# Patient Record
Sex: MALE | Race: WHITE | NOT HISPANIC OR LATINO | Employment: OTHER | URBAN - METROPOLITAN AREA
[De-identification: names, ages, dates, MRNs, and addresses within clinical notes are randomized per-mention and may not be internally consistent; named-entity substitution may affect disease eponyms.]

---

## 2017-06-27 ENCOUNTER — PRE VISIT (OUTPATIENT)
Dept: CARDIOLOGY | Facility: CLINIC | Age: 72
End: 2017-06-27

## 2017-06-29 ENCOUNTER — OFFICE VISIT (OUTPATIENT)
Dept: CARDIOLOGY | Facility: CLINIC | Age: 72
End: 2017-06-29
Attending: INTERNAL MEDICINE
Payer: COMMERCIAL

## 2017-06-29 VITALS
HEART RATE: 74 BPM | SYSTOLIC BLOOD PRESSURE: 130 MMHG | BODY MASS INDEX: 29.51 KG/M2 | WEIGHT: 188 LBS | DIASTOLIC BLOOD PRESSURE: 82 MMHG | HEIGHT: 67 IN

## 2017-06-29 DIAGNOSIS — I25.10 CORONARY ARTERY DISEASE INVOLVING NATIVE CORONARY ARTERY OF NATIVE HEART WITHOUT ANGINA PECTORIS: ICD-10-CM

## 2017-06-29 DIAGNOSIS — E78.2 MIXED HYPERLIPIDEMIA: ICD-10-CM

## 2017-06-29 DIAGNOSIS — Z95.1 POSTSURGICAL AORTOCORONARY BYPASS STATUS: ICD-10-CM

## 2017-06-29 DIAGNOSIS — E88.810 METABOLIC SYNDROME: ICD-10-CM

## 2017-06-29 PROCEDURE — 99214 OFFICE O/P EST MOD 30 MIN: CPT | Performed by: INTERNAL MEDICINE

## 2017-06-29 NOTE — PROGRESS NOTES
HPI and Plan:   See dictation:  I had the pleasure of seeing your patient, Geoffrey Benjamin (Woody), at [facility] for evaluation of coronary artery disease.  The patient is status post 4-vessel bypass surgery 11/07/2006 utilizing the left internal mammary artery to the LAD and a diagonal branch artery sequentially as well as a saphenous vein graft to the posterolateral branch of the right coronary artery and a free radial artery to the obtuse marginal branch artery.  His ejection fraction was well maintained.  His last stress echocardiogram 04/04/2013 was normal.  The patient exercises 4-6 days per week walking either at the mall or outside for 30-40 minutes free of angina or shortness of breath.  He has not had any hospitalizations or surgeries since I saw him 1 year ago.  He denies angina or shortness of breath.  He has significant backache and is seeing physical therapy and a chiropractor.  His hemoglobin A1c at Dr. Sanchez's office is a bit elevated at 8.2%.  The patient was intolerant of atorvastatin and has been able to work up to 10 mg 4 times per week.  He denies claudication when walking.  He denies PND, orthopnea, peripheral edema, syncope, presyncope or palpitations.  FLP in 11/16 was LDL=91 and HDL=29.  ASSESSMENT:   1.  Juan Benjamin is a delightful 70-year-old male status post 4-vessel bypass surgery on 11/07/2006 due to severe 3-vessel coronary artery disease and an abnormal stress test.  He has not had recurrent angina or congestive heart failure.  His last stress echocardiogram in 2013 was normal and we will not repeat this until probably 2018.  He is doing a good job of exercising aerobically free of symptoms.   2.  Hyperlipidemia.  His night cramps are probably not due to his statin.  If he is not having myalgias during the day, he could probably increase his statin to 5-7 days per week.  We also discussed diet and exercise and weight loss.  Luigi Obregon MD, [facility]  [place]  29, 2017 11:31 AM      Orders Placed This Encounter   Procedures     Follow-Up with Cardiologist       Orders Placed This Encounter   Medications     Pseudoephedrine HCl (SUDAFED PO)     Sig: Take by mouth as needed for congestion     RaNITidine HCl (ZANTAC PO)     Sig: OTC- occasional after certain foods     Artificial Tear Solution (TEARS NATURALE OP)     Sig: As directed       There are no discontinued medications.      Encounter Diagnoses   Name Primary?     Coronary artery disease involving native coronary artery of native heart without angina pectoris      Metabolic syndrome      Mixed hyperlipidemia      Postsurgical aortocoronary bypass status        CURRENT MEDICATIONS:  Current Outpatient Prescriptions   Medication Sig Dispense Refill     Pseudoephedrine HCl (SUDAFED PO) Take by mouth as needed for congestion       RaNITidine HCl (ZANTAC PO) OTC- occasional after certain foods       Artificial Tear Solution (TEARS NATURALE OP) As directed       glimepiride (AMARYL) 2 MG tablet Take 2 mg by mouth every morning (before breakfast)       aspirin 325 MG tablet Take 325 mg by mouth daily       testosterone (ANDROGEL) 40.5 MG/2.5GM (1.62%) GEL Place onto the skin daily       tadalafil (CIALIS) 20 MG tablet Take 20 mg by mouth as needed for erectile dysfunction       vitamin  B complex with vitamin C (VITAMIN  B COMPLEX) TABS Take 1 tablet by mouth daily       fexofenadine (ALLEGRA) 180 MG tablet Take 180 mg by mouth Allergy season       Glucosamine-Chondroit-Vit C-Mn (GLUCOSAMINE CHONDROITIN 1500 COMPLEX) CAPS Take by mouth 2 times daily       metFORMIN (GLUCOPHAGE-XR) 750 MG 24 hr tablet Take 750 mg by mouth 2 times daily       ATENOLOL PO Take 25 mg by mouth daily       Levothyroxine Sodium (SYNTHROID PO) Take 112 mcg by mouth daily       LISINOPRIL PO Take 2.5 mg by mouth daily       ATORVASTATIN CALCIUM PO Take 10 mg by mouth 4x week       MINOCYCLINE HCL PO Take 100 mg by mouth daily       Omeprazole  (PRILOSEC PO) Take 20 mg by mouth every morning       multivitamin, therapeutic with minerals (MULTI-VITAMIN) TABS Take 1 tablet by mouth daily       Omega-3 Fatty Acids (OMEGA-3 FISH OIL PO) Take 1 g by mouth daily          ALLERGIES     Allergies   Allergen Reactions     Ezetimibe      Muscle aches     Hmg-Coa-R Inhibitors Nausea and Vomiting     Simvastatin      Muscle aches       PAST MEDICAL HISTORY:  Past Medical History:   Diagnosis Date     Benign neoplasm of colon      Coronary artery disease     cabg 2006     Depression 1989    off medication since     Diverticulosis      Gastro-oesophageal reflux disease      Gout      Graves disease      Hypertension      Impotence of organic origin      Kidney stone      Metabolic syndrome      Osteoarthritis     hands and back     Other and unspecified hyperlipidemia      PONV (postoperative nausea and vomiting)      Postsurgical aortocoronary bypass status      Type II or unspecified type diabetes mellitus without mention of complication, not stated as uncontrolled      Unspecified hypothyroidism        PAST SURGICAL HISTORY:  Past Surgical History:   Procedure Laterality Date     ARTHROSCOPY KNEE      BILATERAL     CORONARY ANGIOGRAPHY ADULT ORDER  11/2006     CORONARY ARTERY BYPASS  11/7/2006    LIMA to LAD and diagonal branch arteries sequentially, SVG to PDA of RCA, and free radial artery to OM     EXTRACORPOREAL SHOCK WAVE LITHOTRIPSY, CYSTOSCOPY, INSERT STENT URETER(S), COMBINED  8/13/2013    Procedure: COMBINED EXTRACORPOREAL SHOCK WAVE LITHOTRIPSY, CYSTOSCOPY, INSERT STENT URETER(S);  CYSTOSCOPY, LEFT URETERAL STENT PLACEMENT, LEFT EXTRACORPOREAL SHOCK WAVE LITHOTRIPSY (ESWL)        FOOT SURGERY      FUSION & TENDON REPAIR     HERNIA REPAIR       SURGICAL HISTORY OF -   1988    Septoplasty at 13     SURGICAL HISTORY OF -   1/2005    Ureteral stent and lithotripsy for kidney stones     SURGICAL HISTORY OF -   8/2013    cataract extraction OD       FAMILY  "HISTORY:  Family History   Problem Relation Age of Onset     Coronary Artery Disease Mother      Coronary Artery Disease Brother      Heart Surgery Brother      CABGx3 at 55     Coronary Artery Disease Brother      Heart Surgery Brother      CABG x2 at 64     Prostate Cancer Father      Family History Negative Brother      Family History Negative Brother        SOCIAL HISTORY:  Social History     Social History     Marital status:      Spouse name: N/A     Number of children: N/A     Years of education: N/A     Social History Main Topics     Smoking status: Former Smoker     Smokeless tobacco: None      Comment: quit 1972     Alcohol use Yes      Comment: Occasion glass of wine- 1-2 x week     Drug use: No     Sexual activity: Not Asked     Other Topics Concern     Caffeine Concern No     coffee: 2 cups a day  Pop: 2-3 12oz bottles a week     Sleep Concern No     Stress Concern No     Weight Concern No     Special Diet No     Exercise Yes     walking 4-5 times a week     Seat Belt Yes     Social History Narrative       Review of Systems:  Skin:  Negative       Eyes:  Positive for glasses    ENT:  Negative      Respiratory:  Negative       Cardiovascular:  Negative      Gastroenterology: Positive for heartburn;reflux    Genitourinary:  not assessed      Musculoskeletal:  Positive for joint pain  (morning cramps.)  Neurologic:  Negative      Psychiatric:  Negative      Heme/Lymph/Imm:  Positive for allergies;hay fever    Endocrine:  Positive for thyroid disorder;diabetes      Physical Exam:  Vitals: /82  Pulse 74  Ht 1.702 m (5' 7\")  Wt 85.3 kg (188 lb)  BMI 29.44 kg/m2    Constitutional:  cooperative, alert and oriented, well developed, well nourished, in no acute distress        Skin:  warm and dry to the touch, no apparent skin lesions or masses noted        Head:  normocephalic, no masses or lesions        Eyes:  pupils equal and round, conjunctivae and lids unremarkable, sclera white, no " xanthalasma, EOMS intact, no nystagmus        ENT:  no pallor or cyanosis, dentition good        Neck:  carotid pulses are full and equal bilaterally, JVP normal, no carotid bruit, no thyromegaly        Chest:  normal breath sounds, clear to auscultation, normal A-P diameter, normal symmetry, normal respiratory excursion, no use of accessory muscles;healed median sternotomy scar          Cardiac: regular rhythm, normal S1/S2, no S3 or S4, apical impulse not displaced, no murmurs, gallops or rubs                  Abdomen:  abdomen soft, non-tender, BS normoactive, no mass, no HSM, no bruits        Vascular:   pulses below the femoral arteries are diminished                                      Extremities and Back:  no deformities, clubbing, cyanosis, erythema observed;no edema         left radial harvest site is well healed with excellent hand filling, left venotomy scar is well healed.    Neurological:  affect appropriate, oriented to time, person and place;no gross motor deficits              CC  Luigi Obregon MD   [facility]  [address]  [place] [id number]

## 2017-06-29 NOTE — LETTER
6/29/2017    Kike Ng MD  [address]   [address]  [facility] [address]    RE: Geoffrey Benjamin       Dear Colleague,    I had the pleasure of seeing Geoffrey Benjamin in the [facility].    HPI and Plan:   See dictation:  I had the pleasure of seeing your patient, Geoffrey Benjamin (Woody), at [facility] for evaluation of coronary artery disease.  The patient is status post 4-vessel bypass surgery 11/07/2006 utilizing the left internal mammary artery to the LAD and a diagonal branch artery sequentially as well as a saphenous vein graft to the posterolateral branch of the right coronary artery and a free radial artery to the obtuse marginal branch artery.  His ejection fraction was well maintained.  His last stress echocardiogram 04/04/2013 was normal.  The patient exercises 4-6 days per week walking either at the mall or outside for 30-40 minutes free of angina or shortness of breath.  He has not had any hospitalizations or surgeries since I saw him 1 year ago.  He denies angina or shortness of breath.  He has significant backache and is seeing physical therapy and a chiropractor.  His hemoglobin A1c at Dr. Sanchez's office is a bit elevated at 8.2%.  The patient was intolerant of atorvastatin and has been able to work up to 10 mg 4 times per week.  He denies claudication when walking.  He denies PND, orthopnea, peripheral edema, syncope, presyncope or palpitations.  FLP in 11/16 was LDL=91 and HDL=29.  ASSESSMENT:   1.  Juan Benjamin is a delightful 70-year-old male status post 4-vessel bypass surgery on 11/07/2006 due to severe 3-vessel coronary artery disease and an abnormal stress test.  He has not had recurrent angina or congestive heart failure.  His last stress echocardiogram in 2013 was normal and we will not repeat this until probably 2018.  He is doing a good job of exercising aerobically free of symptoms.   2.  Hyperlipidemia.  His night  cramps are probably not due to his statin.  If he is not having myalgias during the day, he could probably increase his statin to 5-7 days per week.  We also discussed diet and exercise and weight loss.  Luigi Obregon MD, [facility]  June 29, 2017 11:31 AM      Orders Placed This Encounter   Procedures     Follow-Up with Cardiologist       Orders Placed This Encounter   Medications     Pseudoephedrine HCl (SUDAFED PO)     Sig: Take by mouth as needed for congestion     RaNITidine HCl (ZANTAC PO)     Sig: OTC- occasional after certain foods     Artificial Tear Solution (TEARS NATURALE OP)     Sig: As directed       There are no discontinued medications.      Encounter Diagnoses   Name Primary?     Coronary artery disease involving native coronary artery of native heart without angina pectoris      Metabolic syndrome      Mixed hyperlipidemia      Postsurgical aortocoronary bypass status        CURRENT MEDICATIONS:  Current Outpatient Prescriptions   Medication Sig Dispense Refill     Pseudoephedrine HCl (SUDAFED PO) Take by mouth as needed for congestion       RaNITidine HCl (ZANTAC PO) OTC- occasional after certain foods       Artificial Tear Solution (TEARS NATURALE OP) As directed       glimepiride (AMARYL) 2 MG tablet Take 2 mg by mouth every morning (before breakfast)       aspirin 325 MG tablet Take 325 mg by mouth daily       testosterone (ANDROGEL) 40.5 MG/2.5GM (1.62%) GEL Place onto the skin daily       tadalafil (CIALIS) 20 MG tablet Take 20 mg by mouth as needed for erectile dysfunction       vitamin  B complex with vitamin C (VITAMIN  B COMPLEX) TABS Take 1 tablet by mouth daily       fexofenadine (ALLEGRA) 180 MG tablet Take 180 mg by mouth Allergy season       Glucosamine-Chondroit-Vit C-Mn (GLUCOSAMINE CHONDROITIN 1500 COMPLEX) CAPS Take by mouth 2 times daily       metFORMIN (GLUCOPHAGE-XR) 750 MG 24 hr tablet Take 750 mg by mouth 2 times daily       ATENOLOL PO Take 25 mg by mouth daily        Levothyroxine Sodium (SYNTHROID PO) Take 112 mcg by mouth daily       LISINOPRIL PO Take 2.5 mg by mouth daily       ATORVASTATIN CALCIUM PO Take 10 mg by mouth 4x week       MINOCYCLINE HCL PO Take 100 mg by mouth daily       Omeprazole (PRILOSEC PO) Take 20 mg by mouth every morning       multivitamin, therapeutic with minerals (MULTI-VITAMIN) TABS Take 1 tablet by mouth daily       Omega-3 Fatty Acids (OMEGA-3 FISH OIL PO) Take 1 g by mouth daily          ALLERGIES     Allergies   Allergen Reactions     Ezetimibe      Muscle aches     Hmg-Coa-R Inhibitors Nausea and Vomiting     Simvastatin      Muscle aches       PAST MEDICAL HISTORY:  Past Medical History:   Diagnosis Date     Benign neoplasm of colon      Coronary artery disease     cabg 2006     Depression 1989    off medication since     Diverticulosis      Gastro-oesophageal reflux disease      Gout      Graves disease      Hypertension      Impotence of organic origin      Kidney stone      Metabolic syndrome      Osteoarthritis     hands and back     Other and unspecified hyperlipidemia      PONV (postoperative nausea and vomiting)      Postsurgical aortocoronary bypass status      Type II or unspecified type diabetes mellitus without mention of complication, not stated as uncontrolled      Unspecified hypothyroidism        PAST SURGICAL HISTORY:  Past Surgical History:   Procedure Laterality Date     ARTHROSCOPY KNEE      BILATERAL     CORONARY ANGIOGRAPHY ADULT ORDER  11/2006     CORONARY ARTERY BYPASS  11/7/2006    LIMA to LAD and diagonal branch arteries sequentially, SVG to PDA of RCA, and free radial artery to OM     EXTRACORPOREAL SHOCK WAVE LITHOTRIPSY, CYSTOSCOPY, INSERT STENT URETER(S), COMBINED  8/13/2013    Procedure: COMBINED EXTRACORPOREAL SHOCK WAVE LITHOTRIPSY, CYSTOSCOPY, INSERT STENT URETER(S);  CYSTOSCOPY, LEFT URETERAL STENT PLACEMENT, LEFT EXTRACORPOREAL SHOCK WAVE LITHOTRIPSY (ESWL)        FOOT SURGERY      FUSION & TENDON REPAIR      "HERNIA REPAIR       SURGICAL HISTORY OF -   1988    Septoplasty at 13     SURGICAL HISTORY OF -   1/2005    Ureteral stent and lithotripsy for kidney stones     SURGICAL HISTORY OF -   8/2013    cataract extraction OD       FAMILY HISTORY:  Family History   Problem Relation Age of Onset     Coronary Artery Disease Mother      Coronary Artery Disease Brother      Heart Surgery Brother      CABGx3 at 55     Coronary Artery Disease Brother      Heart Surgery Brother      CABG x2 at 64     Prostate Cancer Father      Family History Negative Brother      Family History Negative Brother        SOCIAL HISTORY:  Social History     Social History     Marital status:      Spouse name: N/A     Number of children: N/A     Years of education: N/A     Social History Main Topics     Smoking status: Former Smoker     Smokeless tobacco: None      Comment: quit 1972     Alcohol use Yes      Comment: Occasion glass of wine- 1-2 x week     Drug use: No     Sexual activity: Not Asked     Other Topics Concern     Caffeine Concern No     coffee: 2 cups a day  Pop: 2-3 12oz bottles a week     Sleep Concern No     Stress Concern No     Weight Concern No     Special Diet No     Exercise Yes     walking 4-5 times a week     Seat Belt Yes     Social History Narrative       Review of Systems:  Skin:  Negative       Eyes:  Positive for glasses    ENT:  Negative      Respiratory:  Negative       Cardiovascular:  Negative      Gastroenterology: Positive for heartburn;reflux    Genitourinary:  not assessed      Musculoskeletal:  Positive for joint pain  (morning cramps.)  Neurologic:  Negative      Psychiatric:  Negative      Heme/Lymph/Imm:  Positive for allergies;hay fever    Endocrine:  Positive for thyroid disorder;diabetes      Physical Exam:  Vitals: /82  Pulse 74  Ht 1.702 m (5' 7\")  Wt 85.3 kg (188 lb)  BMI 29.44 kg/m2    Constitutional:  cooperative, alert and oriented, well developed, well nourished, in no acute distress  "       Skin:  warm and dry to the touch, no apparent skin lesions or masses noted        Head:  normocephalic, no masses or lesions        Eyes:  pupils equal and round, conjunctivae and lids unremarkable, sclera white, no xanthalasma, EOMS intact, no nystagmus        ENT:  no pallor or cyanosis, dentition good        Neck:  carotid pulses are full and equal bilaterally, JVP normal, no carotid bruit, no thyromegaly        Chest:  normal breath sounds, clear to auscultation, normal A-P diameter, normal symmetry, normal respiratory excursion, no use of accessory muscles;healed median sternotomy scar          Cardiac: regular rhythm, normal S1/S2, no S3 or S4, apical impulse not displaced, no murmurs, gallops or rubs                  Abdomen:  abdomen soft, non-tender, BS normoactive, no mass, no HSM, no bruits        Vascular:   pulses below the femoral arteries are diminished                                      Extremities and Back:  no deformities, clubbing, cyanosis, erythema observed;no edema         left radial harvest site is well healed with excellent hand filling, left venotomy scar is well healed.    Neurological:  affect appropriate, oriented to time, person and place;no gross motor deficits        Thank you for allowing me to participate in the care of your patient.    Sincerely,     Luigi Obregon MD     [facility]

## 2017-06-29 NOTE — MR AVS SNAPSHOT
"              After Visit Summary   6/29/2017    Geoffrey Benjamin    MRN: [id number]           Patient Information     Date Of Birth          1945        Visit Information        Provider Department      6/29/2017 10:45 AM Luigi Obregon MD [facility]        Today's Diagnoses     Coronary artery disease involving native coronary artery of native heart without angina pectoris        Metabolic syndrome        Mixed hyperlipidemia        Postsurgical aortocoronary bypass status           Follow-ups after your visit        Additional Services     Follow-Up with Cardiologist                 Future tests that were ordered for you today     Open Future Orders        Priority Expected Expires Ordered    Follow-Up with Cardiologist Routine 6/29/2018 11/11/2018 6/29/2017            Who to contact     If you have questions or need follow up information about today's clinic visit or your schedule please contact [facility] directly at [phone].  Normal or non-critical lab and imaging results will be communicated to you by MyChart, letter or phone within 4 business days after the clinic has received the results. If you do not hear from us within 7 days, please contact the clinic through [employer] or phone. If you have a critical or abnormal lab result, we will notify you by phone as soon as possible.  Submit refill requests through [employer] or call your pharmacy and they will forward the refill request to us. Please allow 3 business days for your refill to be completed.          Additional Information About Your Visit        MyChart Information     [employer] lets you send messages to your doctor, view your test results, renew your prescriptions, schedule appointments and more. To sign up, go to [email or web address] . Click on \"Log in\" on the left side of the screen, which will take you to the Welcome page. Then click on \"Sign up Now\" " "on the right side of the page.     You will be asked to enter the access code listed below, as well as some personal information. Please follow the directions to create your username and password.     Your access code is: [email or web address]  Expires: 2017 11:24 AM     Your access code will  in 90 days. If you need help or a new code, please call your [facility] clinic or [phone].        Care EveryWhere ID     This is your Care EveryWhere ID. This could be used by other organizations to access your [facility] medical records  [id number]        Your Vitals Were     Pulse Height BMI (Body Mass Index)             74 1.702 m (5' 7\") 29.44 kg/m2          Blood Pressure from Last 3 Encounters:   17 130/82   16 118/78   04/06/15 100/65    Weight from Last 3 Encounters:   17 85.3 kg (188 lb)   16 87.1 kg (192 lb)   04/06/15 84.4 kg (186 lb)              We Performed the Following     Follow-Up with Cardiologist        Primary Care Provider Office Phone # Fax #    Kike gN [phone]       [facility] [address]  [facility] [address]        Equal Access to Services     OLGA WILL : Hadii lary ku hadasho Soomaali, waaxda luqadaha, qaybta kaalmada adeegyada, igor idiin hayhiram hall . So [facility] [phone].    ATENCIÓN: Si habla español, tiene a ch disposición servicios gratuitos de asistencia lingüística. [facility] [phone].    We comply with applicable federal civil rights laws and [place] laws. We do not discriminate on the basis of race, color, national origin, age, disability sex, sexual orientation or gender identity.            Thank you!     Thank you for choosing [facility] [place]  for your care. Our goal is always to provide you with excellent care. Hearing back from our patients is one way we can continue to improve our services. Please take a few minutes to complete the written survey that you may " receive in the mail after your visit with us. Thank you!             Your Updated Medication List - Protect others around you: Learn how to safely use, store and throw away your medicines at www.disposemymeds.org.          This list is accurate as of: 6/29/17 11:24 AM.  Always use your most recent med list.                   Brand Name Dispense Instructions for use Diagnosis    ALLEGRA 180 MG tablet   Generic drug:  fexofenadine      Take 180 mg by mouth Allergy season        ANDROGEL 40.5 MG/2.5GM (1.62%) Gel   Generic drug:  testosterone      Place onto the skin daily        aspirin 325 MG tablet      Take 325 mg by mouth daily        ATENOLOL PO      Take 25 mg by mouth daily        ATORVASTATIN CALCIUM PO      Take 10 mg by mouth 4x week        CIALIS 20 MG tablet   Generic drug:  tadalafil      Take 20 mg by mouth as needed for erectile dysfunction        glimepiride 2 MG tablet    AMARYL     Take 2 mg by mouth every morning (before breakfast)        glucosamine chondroitin 1500 complex Caps      Take by mouth 2 times daily        LISINOPRIL PO      Take 2.5 mg by mouth daily        metFORMIN 750 MG 24 hr tablet    GLUCOPHAGE-XR     Take 750 mg by mouth 2 times daily        MINOCYCLINE HCL PO      Take 100 mg by mouth daily        Multi-vitamin Tabs tablet      Take 1 tablet by mouth daily        OMEGA-3 FISH OIL PO      Take 1 g by mouth daily        PRILOSEC PO      Take 20 mg by mouth every morning        SUDAFED PO      Take by mouth as needed for congestion        SYNTHROID PO      Take 112 mcg by mouth daily        TEARS NATURALE OP      As directed        vitamin B complex with vitamin C Tabs tablet      Take 1 tablet by mouth daily        ZANTAC PO      OTC- occasional after certain foods

## 2018-04-24 DIAGNOSIS — N39.0 URINARY TRACT INFECTION: Primary | ICD-10-CM

## 2018-04-25 ENCOUNTER — OFFICE VISIT (OUTPATIENT)
Dept: UROLOGY | Facility: CLINIC | Age: 73
End: 2018-04-25
Payer: COMMERCIAL

## 2018-04-25 ENCOUNTER — HOSPITAL ENCOUNTER (OUTPATIENT)
Dept: GENERAL RADIOLOGY | Facility: CLINIC | Age: 73
Discharge: HOME | End: 2018-04-25
Attending: UROLOGY | Admitting: UROLOGY
Payer: MEDICARE

## 2018-04-25 VITALS
HEART RATE: 76 BPM | BODY MASS INDEX: 30.05 KG/M2 | HEIGHT: 66 IN | WEIGHT: 187 LBS | SYSTOLIC BLOOD PRESSURE: 102 MMHG | DIASTOLIC BLOOD PRESSURE: 66 MMHG | OXYGEN SATURATION: 96 %

## 2018-04-25 DIAGNOSIS — R39.12 BENIGN PROSTATIC HYPERPLASIA WITH WEAK URINARY STREAM: ICD-10-CM

## 2018-04-25 DIAGNOSIS — N20.9 CALCIUM UROLITHIASIS: ICD-10-CM

## 2018-04-25 DIAGNOSIS — N40.1 BENIGN PROSTATIC HYPERPLASIA WITH WEAK URINARY STREAM: ICD-10-CM

## 2018-04-25 DIAGNOSIS — N20.9 CALCIUM UROLITHIASIS: Primary | ICD-10-CM

## 2018-04-25 LAB
ALBUMIN UR-MCNC: NEGATIVE MG/DL
APPEARANCE UR: CLEAR
BILIRUB UR QL STRIP: NEGATIVE
COLOR UR AUTO: YELLOW
GLUCOSE UR STRIP-MCNC: NEGATIVE MG/DL
HGB UR QL STRIP: NEGATIVE
KETONES UR STRIP-MCNC: NEGATIVE MG/DL
LEUKOCYTE ESTERASE UR QL STRIP: NEGATIVE
NITRATE UR QL: NEGATIVE
PH UR STRIP: 5 PH (ref 5–7)
SOURCE: NORMAL
SP GR UR STRIP: 1.02 (ref 1–1.03)
UROBILINOGEN UR STRIP-ACNC: 0.2 EU/DL (ref 0.2–1)

## 2018-04-25 PROCEDURE — 74019 RADEX ABDOMEN 2 VIEWS: CPT

## 2018-04-25 PROCEDURE — 81003 URINALYSIS AUTO W/O SCOPE: CPT | Performed by: UROLOGY

## 2018-04-25 PROCEDURE — 99213 OFFICE O/P EST LOW 20 MIN: CPT | Performed by: UROLOGY

## 2018-04-25 RX ORDER — TAMSULOSIN HYDROCHLORIDE 0.4 MG/1
0.4 CAPSULE ORAL DAILY
Qty: 30 CAPSULE | Refills: 11 | Status: SHIPPED | OUTPATIENT
Start: 2018-04-25 | End: 2019-02-12

## 2018-04-25 RX ORDER — TRIPROLIDINE/PSEUDOEPHEDRINE 2.5MG-60MG
TABLET ORAL
COMMUNITY
Start: 2018-01-04 | End: 2018-11-20

## 2018-04-25 RX ORDER — DOXYCYCLINE 100 MG/1
100 TABLET ORAL
COMMUNITY
Start: 2018-01-04 | End: 2018-11-20

## 2018-04-25 ASSESSMENT — PAIN SCALES - GENERAL: PAINLEVEL: NO PAIN (0)

## 2018-04-25 NOTE — MR AVS SNAPSHOT
After Visit Summary   4/25/2018    Geoffrey Benjamin    MRN: [id number]           Patient Information     Date Of Birth          1945        Visit Information        Provider Department      4/25/2018 2:40 PM Alfonso Sinha MD [facility]        Today's Diagnoses     Calcium urolithiasis    -  1    Urinary tract infection        Benign prostatic hyperplasia with weak urinary stream           Follow-ups after your visit        Your next 10 appointments already scheduled     Apr 25, 2018  3:40 PM CDT   XR KUB with SHXR3   [facility] ([facility])    [address] [id number] [phone]           Please bring a list of your current medicines to your exam. (Include vitamins, minerals and over-thecounter medicines.) Leave your valuables at home.  Tell your doctor if there is a chance you may be pregnant.  You do not need to do anything special for this exam.              Future tests that were ordered for you today     Open Future Orders        Priority Expected Expires Ordered    XR KUB [[id number]] Routine  4/25/2019 4/25/2018            Who to contact     If you have questions or need follow up information about today's clinic visit or your schedule please contact [facility]OLOG[facility] [place] directly at [phone].  Normal or non-critical lab and imaging results will be communicated to you by MyChart, letter or phone within 4 business days after the clinic has received the results. If you do not hear from us within 7 days, please contact the clinic through MyChart or phone. If you have a critical or abnormal lab result, we will notify you by phone as soon as possible.  Submit refill requests through [employer] or call your pharmacy and they will forward the refill request to us. Please allow 3 business days for your refill to be completed.          Additional Information About  "Your Visit        [employer] Information     [employer] lets you send messages to your doctor, view your test results, renew your prescriptions, schedule appointments and more. To sign up, go to [email or web address] . Click on \"Log in\" on the left side of the screen, which will take you to the Welcome page. Then click on \"Sign up Now\" on the right side of the page.     You will be asked to enter the access code listed below, as well as some personal information. Please follow the directions to create your username and password.     Your access code is: [email or web address]  Expires: 2018  3:34 PM     Your access code will  in 90 days. If you need help or a new code, please call your [place] clinic or [phone].        Care EveryWhere ID     This is your Care EveryWhere ID. This could be used by other organizations to access your [place] medical records  [id number]        Your Vitals Were     Pulse Height Pulse Oximetry BMI (Body Mass Index)          76 1.676 m (5' 6\") 96% 30.18 kg/m2         Blood Pressure from Last 3 Encounters:   18 102/66   17 130/82   16 118/78    Weight from Last 3 Encounters:   18 84.8 kg (187 lb)   17 85.3 kg (188 lb)   16 87.1 kg (192 lb)              We Performed the Following     UA without Microscopic          Today's Medication Changes          These changes are accurate as of 18  3:34 PM.  If you have any questions, ask your nurse or doctor.               Start taking these medicines.        Dose/Directions    tamsulosin 0.4 MG capsule   Commonly known as:  FLOMAX   Used for:  Benign prostatic hyperplasia with weak urinary stream   Started by:  Alfonso Sinha MD        Dose:  0.4 mg   Take 1 capsule (0.4 mg) by mouth daily 30 minutes after evening meal Do not take within 4 hours of Cialis   Quantity:  30 capsule   Refills:  11            Where to get your medicines      Some of these will need a paper prescription and others can be " bought over the counter.  Ask your nurse if you have questions.     Bring a paper prescription for each of these medications     tamsulosin 0.4 MG capsule                Primary Care Provider Office Phone # Fax #    Kike Ng [phone]       [address]  [address]  [facility] [address]        Equal Access to Services     OLGA WILL : Hadii aad ku hadasho Soomaali, waaxda luqadaha, qaybta kaalmada adeegyada, waxay elyin hayaan adejudie khnaish labennett . So [facility] [phone].    ATENCIÓN: Si habla español, tiene a ch disposición servicios gratuitos de asistencia lingüística. Hellename al [phone].    We comply with applicable federal civil rights laws and [place] laws. We do not discriminate on the basis of race, color, national origin, age, disability, sex, sexual orientation, or gender identity.            Thank you!     Thank you for choosing [facility] [place]  for your care. Our goal is always to provide you with excellent care. Hearing back from our patients is one way we can continue to improve our services. Please take a few minutes to complete the written survey that you may receive in the mail after your visit with us. Thank you!             Your Updated Medication List - Protect others around you: Learn how to safely use, store and throw away your medicines at www.disposemymeds.org.          This list is accurate as of 4/25/18  3:34 PM.  Always use your most recent med list.                   Brand Name Dispense Instructions for use Diagnosis    ALLEGRA 180 MG tablet   Generic drug:  fexofenadine      Take 180 mg by mouth Allergy season        ANDROGEL 40.5 MG/2.5GM (1.62%) Gel   Generic drug:  testosterone      Place onto the skin daily        aspirin 325 MG tablet      Take 325 mg by mouth daily        ATENOLOL PO      Take 25 mg by mouth daily        ATORVASTATIN CALCIUM PO      Take 10 mg by mouth 4x week        CIALIS 20 MG tablet    Generic drug:  tadalafil      Take 20 mg by mouth as needed for erectile dysfunction        doxycycline Monohydrate 100 MG Tabs      Take 100 mg by mouth        glimepiride 2 MG tablet    AMARYL     Take 2 mg by mouth every morning (before breakfast)        glucosamine chondroitin 1500 complex Caps      Take by mouth 2 times daily        LISINOPRIL PO      Take 2.5 mg by mouth daily        metFORMIN 750 MG 24 hr tablet    GLUCOPHAGE-XR     Take 750 mg by mouth 2 times daily        MINOCYCLINE HCL PO      Take 100 mg by mouth daily        Multi-vitamin Tabs tablet      Take 1 tablet by mouth daily        OMEGA-3 FISH OIL PO      Take 1 g by mouth daily        ONETOUCH VERIO IQ test strip   Generic drug:  blood glucose monitoring      3 times daily.        PRILOSEC PO      Take 20 mg by mouth every morning        SUDAFED PO      Take by mouth as needed for congestion        SYNTHROID PO      Take 112 mcg by mouth daily        tamsulosin 0.4 MG capsule    FLOMAX    30 capsule    Take 1 capsule (0.4 mg) by mouth daily 30 minutes after evening meal Do not take within 4 hours of Cialis    Benign prostatic hyperplasia with weak urinary stream       TEARS NATURALE OP      As directed        vitamin B complex with vitamin C Tabs tablet      Take 1 tablet by mouth daily        WAL-ACT 2.5-60 MG Tabs per tablet   Generic drug:  triprolidine-pseudoePHEDrine      1 po qhs for allergy        ZANTAC PO      OTC- occasional after certain foods

## 2018-04-25 NOTE — LETTER
4/25/2018      RE: Geoffrey Benjamin  [address] [id number]       Geoffrey Benjamin is a very pleasant 72-year-old male who is here to rule out perone's disease and discuss his history of stones in urination. Several weeks ago the patient's wife noticed a soft growth on the ventral side of the penis near the base. He has had mild discomfort in this area for some time, more like pressure and sometimes the discomfort manifests itself at the urethral meatus. Dr. Liang examined him but did not communicate well with him and so he is here for another opinion.  He has had no curvature with his erection, uses Cialis 20 mg and is on testosterone supplements.  He has had stones treated in the past but has had no follow-up for his stones and more than 5 years.  His voiding symptoms include rare urge incontinence, urinary frequency and incomplete emptying.  Other past medical history: Colonic polyps, coronary artery disease, depression, diverticulosis, GERD, gout, Graves' disease, hypertension, metabolic syndrome, osteoarthritis, hyperlipidemia, open heart surgery, type 2 diabetes, hypothyroidism, hypogonadism, foot surgery, hernia repair, knee arthroscopy, ESWL, septoplasty, former smoker  Family history: Heart disease, prostate cancer  Patient has been having digital rectal exams and normal PSAs yearly with Dr. Ng  Medications: Regular aspirin, atenolol, atorvastatin, doxycycline, Allegra, glimepiride, glucosamine/chondroitin, levothyroxine, lisinopril, metformin, multivitamin, fish oil, omeprazole, Sudafed, Zantac, Cialis, testosterone, vitamin B/vitamin C complex  Allergies: ezetimibe, Hmg-coa r inhibitors  Exam: Normal appearance, normal vital signs, alert and oriented, normocephalic, normal respirations, neuro grossly intact. No evidence for peripheral knees disease, circumcised, normal urethral meatus. Left testis is descended and atrophic, right testis is normal. Normal epididymis and cords.  Normal scrotal skin. Groins without hernias or adenopathy.  Normal sphincter tone, no rectal mass or impaction, benign feeling prostate, not large. Normal seminal vesicles  Assessment: No evidence for perone's disease, history of calcium urolithiasis, BPH with moderate symptoms-may improve with trial of tamsulosin. Do not take tamsulosin within 4 hours of taking Cialis.  Patient need yearly PSA and digital rectal exam because of family history and testosterone supplements.  Patient will obtain a KUB to rule out recurrent stones.  Trial of tamsulosin for the next 4-6 weeks  See me yearly    Alfonso Sinha MD

## 2018-04-25 NOTE — NURSING NOTE
Chief Complaint   Patient presents with     Penile pain     Pt here today for penile pain     UA RESULTS:  Recent Labs   Lab Test  04/25/18   1435   COLOR  Yellow   APPEARANCE  Clear   URINEGLC  Negative   URINEBILI  Negative   URINEKETONE  Negative   SG  1.025   UBLD  Negative   URINEPH  5.0   PROTEIN  Negative   UROBILINOGEN  0.2   NITRITE  Negative   LEUKEST  Negative     UA done today is clear    Marcia Mark CMA

## 2018-04-25 NOTE — LETTER
4/25/2018       RE: Geoffrey Benjamin  [address] [address]     Dear Colleague,    Thank you for referring your patient, Geoffrey Benjamin, to the [facility] [place] [facility]. Please see a copy of my visit note below.    Geoffrey Benjamin is a very pleasant 72-year-old male who is here to rule out perone's disease and discuss his history of stones in urination. Several weeks ago the patient's wife noticed a soft growth on the ventral side of the penis near the base. He has had mild discomfort in this area for some time, more like pressure and sometimes the discomfort manifests itself at the urethral meatus. Dr. Liang examined him but did not communicate well with him and so he is here for another opinion.  He has had no curvature with his erection, uses Cialis 20 mg and is on testosterone supplements.  He has had stones treated in the past but has had no follow-up for his stones and more than 5 years.  His voiding symptoms include rare urge incontinence, urinary frequency and incomplete emptying.  Other past medical history: Colonic polyps, coronary artery disease, depression, diverticulosis, GERD, gout, Graves' disease, hypertension, metabolic syndrome, osteoarthritis, hyperlipidemia, open heart surgery, type 2 diabetes, hypothyroidism, hypogonadism, foot surgery, hernia repair, knee arthroscopy, ESWL, septoplasty, former smoker  Family history: Heart disease, prostate cancer  Patient has been having digital rectal exams and normal PSAs yearly with Dr. Ng  Medications: Regular aspirin, atenolol, atorvastatin, doxycycline, Allegra, glimepiride, glucosamine/chondroitin, levothyroxine, lisinopril, metformin, multivitamin, fish oil, omeprazole, Sudafed, Zantac, Cialis, testosterone, vitamin B/vitamin C complex  Allergies: ezetimibe, Hmg-coa r inhibitors  Exam: Normal appearance, normal vital signs, alert and oriented,  normocephalic, normal respirations, neuro grossly intact. No evidence for peripheral knees disease, circumcised, normal urethral meatus. Left testis is descended and atrophic, right testis is normal. Normal epididymis and cords. Normal scrotal skin. Groins without hernias or adenopathy.  Normal sphincter tone, no rectal mass or impaction, benign feeling prostate, not large. Normal seminal vesicles  Assessment: No evidence for perone's disease, history of calcium urolithiasis, BPH with moderate symptoms-may improve with trial of tamsulosin. Do not take tamsulosin within 4 hours of taking Cialis.  Patient need yearly PSA and digital rectal exam because of family history and testosterone supplements.  Patient will obtain a KUB to rule out recurrent stones.  Trial of tamsulosin for the next 4-6 weeks  See me yearly    Again, thank you for allowing me to participate in the care of your patient.      Sincerely,    Alfonso Sinha MD

## 2018-04-25 NOTE — PROGRESS NOTES
Geoffrey Benjamin is a very pleasant 72-year-old male who is here to rule out perone's disease and discuss his history of stones in urination. Several weeks ago the patient's wife noticed a soft growth on the ventral side of the penis near the base. He has had mild discomfort in this area for some time, more like pressure and sometimes the discomfort manifests itself at the urethral meatus. Dr. Liang examined him but did not communicate well with him and so he is here for another opinion.  He has had no curvature with his erection, uses Cialis 20 mg and is on testosterone supplements.  He has had stones treated in the past but has had no follow-up for his stones and more than 5 years.  His voiding symptoms include rare urge incontinence, urinary frequency and incomplete emptying.  Other past medical history: Colonic polyps, coronary artery disease, depression, diverticulosis, GERD, gout, Graves' disease, hypertension, metabolic syndrome, osteoarthritis, hyperlipidemia, open heart surgery, type 2 diabetes, hypothyroidism, hypogonadism, foot surgery, hernia repair, knee arthroscopy, ESWL, septoplasty, former smoker  Family history: Heart disease, prostate cancer  Patient has been having digital rectal exams and normal PSAs yearly with Dr. gN  Medications: Regular aspirin, atenolol, atorvastatin, doxycycline, Allegra, glimepiride, glucosamine/chondroitin, levothyroxine, lisinopril, metformin, multivitamin, fish oil, omeprazole, Sudafed, Zantac, Cialis, testosterone, vitamin B/vitamin C complex  Allergies: ezetimibe, Hmg-coa r inhibitors  Exam: Normal appearance, normal vital signs, alert and oriented, normocephalic, normal respirations, neuro grossly intact. No evidence for peripheral knees disease, circumcised, normal urethral meatus. Left testis is descended and atrophic, right testis is normal. Normal epididymis and cords. Normal scrotal skin. Groins without hernias or adenopathy.  Normal sphincter tone, no rectal  mass or impaction, benign feeling prostate, not large. Normal seminal vesicles  Assessment: No evidence for perone's disease, history of calcium urolithiasis, BPH with moderate symptoms-may improve with trial of tamsulosin. Do not take tamsulosin within 4 hours of taking Cialis.  Patient need yearly PSA and digital rectal exam because of family history and testosterone supplements.  Patient will obtain a KUB to rule out recurrent stones.  Trial of tamsulosin for the next 4-6 weeks  See me yearly

## 2018-04-26 DIAGNOSIS — N20.1 CALCULUS OF DISTAL LEFT URETER: Primary | ICD-10-CM

## 2018-04-26 RX ORDER — CEFAZOLIN SODIUM 1 G
1 VIAL (EA) INJECTION SEE ADMIN INSTRUCTIONS
Status: CANCELLED | OUTPATIENT
Start: 2018-04-26

## 2018-04-30 ENCOUNTER — TELEPHONE (OUTPATIENT)
Dept: UROLOGY | Facility: CLINIC | Age: 73
End: 2018-04-30

## 2018-04-30 DIAGNOSIS — N20.0 CALCULUS OF KIDNEY: Primary | ICD-10-CM

## 2018-04-30 RX ORDER — OXYCODONE HYDROCHLORIDE 5 MG/1
5 TABLET ORAL EVERY 6 HOURS PRN
Qty: 12 TABLET | Refills: 0 | Status: SHIPPED | OUTPATIENT
Start: 2018-04-30 | End: 2018-05-29

## 2018-04-30 NOTE — TELEPHONE ENCOUNTER
He is requesting a narcotic to take with him on vacation if he needs it. RX for # 12 oxycodone written by Shayy PARIS . He will  Come  he understands he  Cannot drive while on this medication. Marly Conde LPN

## 2018-04-30 NOTE — TELEPHONE ENCOUNTER
Geoffrey left message on [facility] [facility] asking what he can take for pain that is not a blood thinner. Vázquez amparo and left him a message that Tylenol or generic would be ok . If he needs something stronger call the [employer] back. Marly Conde LPN

## 2018-05-08 ENCOUNTER — ANESTHESIA EVENT (OUTPATIENT)
Dept: SURGERY | Facility: CLINIC | Age: 73
End: 2018-05-08
Payer: MEDICARE

## 2018-05-08 ENCOUNTER — HOSPITAL ENCOUNTER (OUTPATIENT)
Facility: CLINIC | Age: 73
Discharge: HOME | End: 2018-05-08
Attending: UROLOGY | Admitting: UROLOGY
Payer: MEDICARE

## 2018-05-08 ENCOUNTER — ANESTHESIA (OUTPATIENT)
Dept: SURGERY | Facility: CLINIC | Age: 73
End: 2018-05-08
Payer: MEDICARE

## 2018-05-08 ENCOUNTER — APPOINTMENT (OUTPATIENT)
Dept: GENERAL RADIOLOGY | Facility: CLINIC | Age: 73
End: 2018-05-08
Attending: UROLOGY
Payer: MEDICARE

## 2018-05-08 VITALS
RESPIRATION RATE: 18 BRPM | WEIGHT: 194 LBS | HEIGHT: 66 IN | DIASTOLIC BLOOD PRESSURE: 79 MMHG | SYSTOLIC BLOOD PRESSURE: 121 MMHG | TEMPERATURE: 97.9 F | HEART RATE: 78 BPM | OXYGEN SATURATION: 97 % | BODY MASS INDEX: 31.18 KG/M2

## 2018-05-08 DIAGNOSIS — N20.1 CALCULUS OF DISTAL LEFT URETER: ICD-10-CM

## 2018-05-08 LAB
COPATH REPORT: NORMAL
CREAT SERPL-MCNC: 0.78 MG/DL (ref 0.66–1.25)
GFR SERPL CREATININE-BSD FRML MDRD: >90 ML/MIN/1.7M2
GLUCOSE BLDC GLUCOMTR-MCNC: 134 MG/DL (ref 70–99)
GLUCOSE BLDC GLUCOMTR-MCNC: 174 MG/DL (ref 70–99)
POTASSIUM SERPL-SCNC: 4.7 MMOL/L (ref 3.4–5.3)

## 2018-05-08 PROCEDURE — 25000128 H RX IP 250 OP 636: Performed by: UROLOGY

## 2018-05-08 PROCEDURE — 71000027 ZZH RECOVERY PHASE 2 EACH 15 MINS: Performed by: UROLOGY

## 2018-05-08 PROCEDURE — 40000277 XR SURGERY CARM FLUORO LESS THAN 5 MIN W STILLS: Mod: TC

## 2018-05-08 PROCEDURE — 82365 CALCULUS SPECTROSCOPY: CPT | Performed by: UROLOGY

## 2018-05-08 PROCEDURE — 52356 CYSTO/URETERO W/LITHOTRIPSY: CPT | Mod: LT | Performed by: UROLOGY

## 2018-05-08 PROCEDURE — 25000128 H RX IP 250 OP 636: Performed by: ANESTHESIOLOGY

## 2018-05-08 PROCEDURE — 25000125 ZZHC RX 250: Performed by: ANESTHESIOLOGY

## 2018-05-08 PROCEDURE — 88300 SURGICAL PATH GROSS: CPT | Performed by: UROLOGY

## 2018-05-08 PROCEDURE — 27211024 ZZHC OR SUPPLY OTHER OPNP: Performed by: UROLOGY

## 2018-05-08 PROCEDURE — 36415 COLL VENOUS BLD VENIPUNCTURE: CPT | Performed by: ANESTHESIOLOGY

## 2018-05-08 PROCEDURE — 25000128 H RX IP 250 OP 636: Performed by: NURSE ANESTHETIST, CERTIFIED REGISTERED

## 2018-05-08 PROCEDURE — 36000060 ZZH SURGERY LEVEL 3 W FLUORO 1ST 30 MIN: Performed by: UROLOGY

## 2018-05-08 PROCEDURE — 84132 ASSAY OF SERUM POTASSIUM: CPT | Performed by: ANESTHESIOLOGY

## 2018-05-08 PROCEDURE — 25000125 ZZHC RX 250: Performed by: NURSE ANESTHETIST, CERTIFIED REGISTERED

## 2018-05-08 PROCEDURE — 25000566 ZZH SEVOFLURANE, EA 15 MIN: Performed by: UROLOGY

## 2018-05-08 PROCEDURE — 88300 SURGICAL PATH GROSS: CPT | Mod: 26 | Performed by: UROLOGY

## 2018-05-08 PROCEDURE — C1769 GUIDE WIRE: HCPCS | Performed by: UROLOGY

## 2018-05-08 PROCEDURE — 37000008 ZZH ANESTHESIA TECHNICAL FEE, 1ST 30 MIN: Performed by: UROLOGY

## 2018-05-08 PROCEDURE — 36000058 ZZH SURGERY LEVEL 3 EA 15 ADDTL MIN: Performed by: UROLOGY

## 2018-05-08 PROCEDURE — 71000012 ZZH RECOVERY PHASE 1 LEVEL 1 FIRST HR: Performed by: UROLOGY

## 2018-05-08 PROCEDURE — 82565 ASSAY OF CREATININE: CPT | Performed by: ANESTHESIOLOGY

## 2018-05-08 PROCEDURE — C2617 STENT, NON-COR, TEM W/O DEL: HCPCS | Performed by: UROLOGY

## 2018-05-08 PROCEDURE — 40000306 ZZH STATISTIC PRE PROC ASSESS II: Performed by: UROLOGY

## 2018-05-08 PROCEDURE — 82962 GLUCOSE BLOOD TEST: CPT

## 2018-05-08 PROCEDURE — 37000009 ZZH ANESTHESIA TECHNICAL FEE, EACH ADDTL 15 MIN: Performed by: UROLOGY

## 2018-05-08 PROCEDURE — 27210794 ZZH OR GENERAL SUPPLY STERILE: Performed by: UROLOGY

## 2018-05-08 DEVICE — STENT URETERAL DBL PIGTAIL INLAY 4.7FRX24CM 778424
Type: IMPLANTABLE DEVICE | Site: URETER | Status: NON-FUNCTIONAL
Removed: 2018-06-05

## 2018-05-08 RX ORDER — MEPERIDINE HYDROCHLORIDE 50 MG/ML
12.5 INJECTION INTRAMUSCULAR; INTRAVENOUS; SUBCUTANEOUS EVERY 5 MIN PRN
Status: DISCONTINUED | OUTPATIENT
Start: 2018-05-08 | End: 2018-05-08 | Stop reason: HOSPADM

## 2018-05-08 RX ORDER — ALBUTEROL SULFATE 0.83 MG/ML
2.5 SOLUTION RESPIRATORY (INHALATION) EVERY 4 HOURS PRN
Status: DISCONTINUED | OUTPATIENT
Start: 2018-05-08 | End: 2018-05-08 | Stop reason: HOSPADM

## 2018-05-08 RX ORDER — DEXAMETHASONE SODIUM PHOSPHATE 4 MG/ML
INJECTION, SOLUTION INTRA-ARTICULAR; INTRALESIONAL; INTRAMUSCULAR; INTRAVENOUS; SOFT TISSUE PRN
Status: DISCONTINUED | OUTPATIENT
Start: 2018-05-08 | End: 2018-05-08

## 2018-05-08 RX ORDER — ONDANSETRON 2 MG/ML
4 INJECTION INTRAMUSCULAR; INTRAVENOUS EVERY 30 MIN PRN
Status: DISCONTINUED | OUTPATIENT
Start: 2018-05-08 | End: 2018-05-08 | Stop reason: HOSPADM

## 2018-05-08 RX ORDER — FENTANYL CITRATE 50 UG/ML
25-50 INJECTION, SOLUTION INTRAMUSCULAR; INTRAVENOUS
Status: DISCONTINUED | OUTPATIENT
Start: 2018-05-08 | End: 2018-05-08 | Stop reason: HOSPADM

## 2018-05-08 RX ORDER — PROPOFOL 10 MG/ML
INJECTION, EMULSION INTRAVENOUS PRN
Status: DISCONTINUED | OUTPATIENT
Start: 2018-05-08 | End: 2018-05-08

## 2018-05-08 RX ORDER — SULFAMETHOXAZOLE/TRIMETHOPRIM 800-160 MG
1 TABLET ORAL 2 TIMES DAILY
Qty: 2 TABLET | Refills: 0 | Status: SHIPPED | OUTPATIENT
Start: 2018-05-08 | End: 2018-05-11

## 2018-05-08 RX ORDER — LABETALOL HYDROCHLORIDE 5 MG/ML
10 INJECTION, SOLUTION INTRAVENOUS
Status: DISCONTINUED | OUTPATIENT
Start: 2018-05-08 | End: 2018-05-08 | Stop reason: HOSPADM

## 2018-05-08 RX ORDER — ONDANSETRON 4 MG/1
4 TABLET, ORALLY DISINTEGRATING ORAL EVERY 30 MIN PRN
Status: DISCONTINUED | OUTPATIENT
Start: 2018-05-08 | End: 2018-05-08 | Stop reason: HOSPADM

## 2018-05-08 RX ORDER — DIMENHYDRINATE 50 MG/ML
25 INJECTION, SOLUTION INTRAMUSCULAR; INTRAVENOUS
Status: DISCONTINUED | OUTPATIENT
Start: 2018-05-08 | End: 2018-05-08 | Stop reason: HOSPADM

## 2018-05-08 RX ORDER — FENTANYL CITRATE 50 UG/ML
INJECTION, SOLUTION INTRAMUSCULAR; INTRAVENOUS PRN
Status: DISCONTINUED | OUTPATIENT
Start: 2018-05-08 | End: 2018-05-08

## 2018-05-08 RX ORDER — NALOXONE HYDROCHLORIDE 0.4 MG/ML
.1-.4 INJECTION, SOLUTION INTRAMUSCULAR; INTRAVENOUS; SUBCUTANEOUS
Status: DISCONTINUED | OUTPATIENT
Start: 2018-05-08 | End: 2018-05-08 | Stop reason: HOSPADM

## 2018-05-08 RX ORDER — ONDANSETRON 2 MG/ML
INJECTION INTRAMUSCULAR; INTRAVENOUS PRN
Status: DISCONTINUED | OUTPATIENT
Start: 2018-05-08 | End: 2018-05-08

## 2018-05-08 RX ORDER — LIDOCAINE 40 MG/G
CREAM TOPICAL
Status: DISCONTINUED | OUTPATIENT
Start: 2018-05-08 | End: 2018-05-08 | Stop reason: HOSPADM

## 2018-05-08 RX ORDER — KETOROLAC TROMETHAMINE 30 MG/ML
INJECTION, SOLUTION INTRAMUSCULAR; INTRAVENOUS PRN
Status: DISCONTINUED | OUTPATIENT
Start: 2018-05-08 | End: 2018-05-08

## 2018-05-08 RX ORDER — SODIUM CHLORIDE, SODIUM LACTATE, POTASSIUM CHLORIDE, CALCIUM CHLORIDE 600; 310; 30; 20 MG/100ML; MG/100ML; MG/100ML; MG/100ML
INJECTION, SOLUTION INTRAVENOUS CONTINUOUS
Status: DISCONTINUED | OUTPATIENT
Start: 2018-05-08 | End: 2018-05-08 | Stop reason: HOSPADM

## 2018-05-08 RX ORDER — GLYCOPYRROLATE 0.2 MG/ML
INJECTION, SOLUTION INTRAMUSCULAR; INTRAVENOUS PRN
Status: DISCONTINUED | OUTPATIENT
Start: 2018-05-08 | End: 2018-05-08

## 2018-05-08 RX ORDER — CEFAZOLIN SODIUM 1 G/3ML
1 INJECTION, POWDER, FOR SOLUTION INTRAMUSCULAR; INTRAVENOUS SEE ADMIN INSTRUCTIONS
Status: DISCONTINUED | OUTPATIENT
Start: 2018-05-08 | End: 2018-05-08 | Stop reason: HOSPADM

## 2018-05-08 RX ORDER — CEFAZOLIN SODIUM 2 G/100ML
2 INJECTION, SOLUTION INTRAVENOUS
Status: COMPLETED | OUTPATIENT
Start: 2018-05-08 | End: 2018-05-08

## 2018-05-08 RX ORDER — DIAZEPAM 10 MG/2ML
2.5 INJECTION, SOLUTION INTRAMUSCULAR; INTRAVENOUS
Status: DISCONTINUED | OUTPATIENT
Start: 2018-05-08 | End: 2018-05-08 | Stop reason: HOSPADM

## 2018-05-08 RX ORDER — HYDROMORPHONE HYDROCHLORIDE 1 MG/ML
.3-.5 INJECTION, SOLUTION INTRAMUSCULAR; INTRAVENOUS; SUBCUTANEOUS EVERY 5 MIN PRN
Status: DISCONTINUED | OUTPATIENT
Start: 2018-05-08 | End: 2018-05-08 | Stop reason: HOSPADM

## 2018-05-08 RX ADMIN — DEXAMETHASONE SODIUM PHOSPHATE 8 MG: 4 INJECTION, SOLUTION INTRA-ARTICULAR; INTRALESIONAL; INTRAMUSCULAR; INTRAVENOUS; SOFT TISSUE at 09:54

## 2018-05-08 RX ADMIN — PROPOFOL 200 MG: 10 INJECTION, EMULSION INTRAVENOUS at 09:54

## 2018-05-08 RX ADMIN — CEFAZOLIN SODIUM 2 G: 2 INJECTION, SOLUTION INTRAVENOUS at 09:58

## 2018-05-08 RX ADMIN — KETOROLAC TROMETHAMINE 30 MG: 30 INJECTION, SOLUTION INTRAMUSCULAR at 09:54

## 2018-05-08 RX ADMIN — LIDOCAINE HYDROCHLORIDE 30 MG: 10 INJECTION, SOLUTION EPIDURAL; INFILTRATION; INTRACAUDAL; PERINEURAL at 09:54

## 2018-05-08 RX ADMIN — GLYCOPYRROLATE 0.2 MG: 0.2 INJECTION, SOLUTION INTRAMUSCULAR; INTRAVENOUS at 09:54

## 2018-05-08 RX ADMIN — SODIUM CHLORIDE, POTASSIUM CHLORIDE, SODIUM LACTATE AND CALCIUM CHLORIDE: 600; 310; 30; 20 INJECTION, SOLUTION INTRAVENOUS at 10:15

## 2018-05-08 RX ADMIN — FENTANYL CITRATE 100 MCG: 50 INJECTION, SOLUTION INTRAMUSCULAR; INTRAVENOUS at 09:54

## 2018-05-08 RX ADMIN — PHENYLEPHRINE HYDROCHLORIDE 100 MCG: 10 INJECTION, SOLUTION INTRAMUSCULAR; INTRAVENOUS; SUBCUTANEOUS at 10:06

## 2018-05-08 RX ADMIN — SODIUM CHLORIDE, POTASSIUM CHLORIDE, SODIUM LACTATE AND CALCIUM CHLORIDE: 600; 310; 30; 20 INJECTION, SOLUTION INTRAVENOUS at 09:00

## 2018-05-08 RX ADMIN — ONDANSETRON 4 MG: 2 INJECTION INTRAMUSCULAR; INTRAVENOUS at 09:54

## 2018-05-08 RX ADMIN — PHENYLEPHRINE HYDROCHLORIDE 100 MCG: 10 INJECTION, SOLUTION INTRAMUSCULAR; INTRAVENOUS; SUBCUTANEOUS at 10:09

## 2018-05-08 NOTE — IP AVS SNAPSHOT
MRN:[id number]                      After Visit Summary   5/8/2018    Geoffrey Benjamin    MRN: [id number]           Thank you!     Thank you for choosing [facility] for your care. Our goal is always to provide you with excellent care. Hearing back from our patients is one way we can continue to improve our services. Please take a few minutes to complete the written survey that you may receive in the mail after you visit. If you would like to speak to someone directly about your visit please contact Patient Relations at [phone]. Thank you!          Patient Information     Date Of Birth          1945        About your hospital stay     You were admitted on:  May 8, 2018 You last received care in the:  [facility] PreOP/PostOP    You were discharged on:  May 8, 2018       Who to Call     For medical emergencies, please call 911.  For non-urgent questions about your medical care, please call your primary care provider or clinic, [phone]  For questions related to your surgery, please call your surgery clinic        Attending Provider     Provider Specialty    Alfonso Sinha MD Urology       Primary Care Provider Office Phone # Fax #    Kike Tai Ng [phone]      Your next 10 appointments already scheduled     May 15, 2018  8:20 AM CDT   Cystoscopy with Alfonso Sinha MD, UB CYF   [facility] ([facility])    [address]  [address]  [facility] [address] [phone]              Further instructions from your care team       Take septra DS tonight with evening meal, repeat in 12 hours  Restart Multivitamin, Fish oil and aspirin in 48 hours  See Dr Sinha in 4 weeks to go over stone analysis  CYSTOSCOPY DISCHARGE INSTRUCTIONS  [facility] / UROLOGY  [facility]  [phone]    YOU MAY GO BACK TO YOUR NORMAL DIET AND ACTIVITY,  UNLESS YOUR DOCTOR TELLS YOU NOT TO.    FOR THE NEXT TWO DAYS, YOU MAY NOTICE:    SOME BLOOD IN YOUR URINE.  SOME BURNING WHEN YOU URINATE (USE THE TOILET).  AN URGE TO URINATE MORE OFTEN.  BLADDER SPASMS.    THESE ARE NORMAL AFTER THE PROCEDURE.  THEY SHOULD GO AWAY AFTER A DAY OR TWO.  TO RELIEVE THESE PROBLEMS:     DRINK 6 TO 8 LARGE GLASSES OF WATER EACH DAY (INCLUDES DRINKS AT MEALS).  THIS WILL HELP CLEAR THE URINE.    TAKE WARM BATHS TO RELIEVE PAIN AND BLADDER SPASMS.  DO NOT ADD ANYTHING TO THE BATH WATER.    YOUR DOCTOR MAY PRESCRIBE PAIN MEDICINE.  YOU MAY ALSO TAKE TYLENOL (ACETAMINOPHEN) FOR PAIN.    CALL YOUR SURGEON IF YOU HAVE:    A FEVER OVER 101 DEGREES.  CHECK YOUR TEMPERATURE UNDER YOUR TONGUE.    CHILLS.    FAILURE TO URINATE (NO URINE COMES OUT WHEN YOU TRY TO USE THE TOILET).  TRY SOAKING IN A BATHTUB FULL OF WARM WATER.  IF STILL NO URINE, CALL YOUR DOCTOR.    A LOT OF BLOOD IN THE URINE, OR BLOOD CLOTS LARGER THAN A NICKEL.      PAIN IN THE BACK OR BELLY AREA (ABDOMEN).    PAIN OR SPASMS THAT ARE NOT RELIEVED BY WARM TUB BATHS AND PAIN MEDICINE.      SEVERE PAIN, BURNING OR OTHER PROBLEMS WHILE PASSING URINE.    PAIN THAT GETS WORSE AFTER TWO DAYS.     STENT INFORMATION/DISCHARGE INSTRUCTIONS   o [facility] / UROLOGY  [facility]  [phone]    During surgery, a stent may be placed in the ureter.  The ureter is the tube that drains urine from the kidney to the bladder.  The stent is placed to dilate (open) the ureter so stone fragments can pass easily through the ureter or to decrease ureteral swelling after surgery or to relieve an obstruction.      The stent is made of silicone.  The upper end of the stent curls in the kidney while the lower end rests in the bladder.    While the stent is in place you may experience the following symptoms:  Blood and/or small blood clots in the urine  Bladder spasms (frequency and urgency of urination)  Discomfort or aching in  the back or side where the stent is  Burning or discomfort at the end of urine stream    To decrease these symptoms you should:  Take antispasmodic medication as prescribed (Detrol, Ditropan, etc.)  Drink plenty of fluids but avoid caffeine and citrus (include cranberry)  If you are having discomfort in back or side, decrease activity    Please call your physician or the physician on call if you experience:  Fever greater than 101 degrees  Severe pain not relieved by pain medication or rest    Please make an appointment for the removal of the stent according to your physician's instructions.             GENERAL ANESTHESIA OR SEDATION ADULT DISCHARGE INSTRUCTIONS   SPECIAL PRECAUTIONS FOR 24 HOURS AFTER SURGERY    IT IS NOT UNUSUAL TO FEEL LIGHT-HEADED OR FAINT, UP TO 24 HOURS AFTER SURGERY OR WHILE TAKING PAIN MEDICATION.  IF YOU HAVE THESE SYMPTOMS; SIT FOR A FEW MINUTES BEFORE STANDING AND HAVE SOMEONE ASSIST YOU WHEN YOU GET UP TO WALK OR USE THE BATHROOM.    YOU SHOULD REST AND RELAX FOR THE NEXT 24 HOURS AND YOU MUST MAKE ARRANGEMENTS TO HAVE SOMEONE STAY WITH YOU FOR AT LEAST 24 HOURS AFTER YOUR DISCHARGE.  AVOID HAZARDOUS AND STRENUOUS ACTIVITIES.  DO NOT MAKE IMPORTANT DECISIONS FOR 24 HOURS.    DO NOT DRIVE ANY VEHICLE OR OPERATE MECHANICAL EQUIPMENT FOR 24 HOURS FOLLOWING THE END OF YOUR SURGERY.  EVEN THOUGH YOU MAY FEEL NORMAL, YOUR REACTIONS MAY BE AFFECTED BY THE MEDICATION YOU HAVE RECEIVED.    DO NOT DRINK ALCOHOLIC BEVERAGES FOR 24 HOURS FOLLOWING YOUR SURGERY.    DRINK CLEAR LIQUIDS (APPLE JUICE, GINGER ALE, 7-UP, BROTH, ETC.).  PROGRESS TO YOUR REGULAR DIET AS YOU FEEL ABLE.    YOU MAY HAVE A DRY MOUTH, A SORE THROAT, MUSCLES ACHES OR TROUBLE SLEEPING.  THESE SHOULD GO AWAY AFTER 24 HOURS.    CALL YOUR DOCTOR FOR ANY OF THE FOLLOWING:  SIGNS OF INFECTION (FEVER, GROWING TENDERNESS AT THE SURGERY SITE, A LARGE AMOUNT OF DRAINAGE OR BLEEDING, SEVERE PAIN, FOUL-SMELLING DRAINAGE, REDNESS OR  "SWELLING.    IT HAS BEEN OVER 8 TO 10 HOURS SINCE SURGERY AND YOU ARE STILL NOT ABLE TO URINATE (PASS WATER).     .You received Toradol, an IV form of ibuprofen (Motrin) at 1000*.  Do not take any ibuprofen products until 0400pm*.    Pending Results     Date and Time Order Name Status Description    2018 1139 Surgical pathology exam In process     2018 1021 Stone analysis In process             Admission Information     Date & Time Provider Department Dept. Phone    2018 Alfonso Sinha MD [facility] PreOP/PostOP [phone]      Your Vitals Were     Blood Pressure Pulse Temperature Respirations Height Weight    121/81 (BP Location: Right arm) 78 97.7  F (36.5  C) (Temporal) 16 1.676 m (5' 6\") 88 kg (194 lb)    Pulse Oximetry BMI (Body Mass Index)                96% 31.31 kg/m2          MyChart Information     [employer] lets you send messages to your doctor, view your test results, renew your prescriptions, schedule appointments and more. To sign up, go to [email or web address] . Click on \"Log in\" on the left side of the screen, which will take you to the Welcome page. Then click on \"Sign up Now\" on the right side of the page.     You will be asked to enter the access code listed below, as well as some personal information. Please follow the directions to create your username and password.     Your access code is: [email or web address]  Expires: 2018  3:34 PM     Your access code will  in 90 days. If you need help or a new code, please call your [place] clinic or [phone].        Care EveryWhere ID     This is your Care EveryWhere ID. This could be used by other organizations to access your [place] medical records  [id number]        Equal Access to Services     OLGA WILL : Quinn Gould, nahid [place], qaigor flynn. So [facility] [phone].    ATENCIÓN: Si habla español, tiene a ch disposición servicios gratuitos " de asistencia lingüística. Ernestine souza [phone].    We comply with applicable federal civil rights laws and [place] laws. We do not discriminate on the basis of race, color, national origin, age, disability, sex, sexual orientation, or gender identity.               Review of your medicines      START taking        Dose / Directions    sulfamethoxazole-trimethoprim 800-160 MG per tablet   Commonly known as:  BACTRIM DS/SEPTRA DS   Used for:  Calculus of distal left ureter        Dose:  1 tablet   Take 1 tablet by mouth 2 times daily for 3 days   Quantity:  2 tablet   Refills:  0         CONTINUE these medicines which have NOT CHANGED        Dose / Directions    ALLEGRA 180 MG tablet   Generic drug:  fexofenadine        Dose:  180 mg   Take 180 mg by mouth Allergy season   Refills:  0       ATENOLOL PO        Dose:  25 mg   Take 25 mg by mouth daily   Refills:  0       ATORVASTATIN CALCIUM PO        Dose:  10 mg   Take 10 mg by mouth 4x week   Refills:  0       CIALIS 20 MG tablet   Generic drug:  tadalafil        Dose:  20 mg   Take 20 mg by mouth as needed for erectile dysfunction   Refills:  0       doxycycline Monohydrate 100 MG Tabs        Dose:  100 mg   Take 100 mg by mouth   Refills:  0       glimepiride 2 MG tablet   Commonly known as:  AMARYL        Dose:  2 mg   Take 2 mg by mouth every morning (before breakfast)   Refills:  0       glucosamine chondroitin 1500 complex Caps        Take by mouth 2 times daily   Refills:  0       LISINOPRIL PO        Dose:  2.5 mg   Take 2.5 mg by mouth daily   Refills:  0       metFORMIN 750 MG 24 hr tablet   Commonly known as:  GLUCOPHAGE-XR        Dose:  750 mg   Take 750 mg by mouth 2 times daily   Refills:  0       MINOCYCLINE HCL PO        Dose:  100 mg   Take 100 mg by mouth daily   Refills:  0       ONETOUCH VERIO IQ test strip   Generic drug:  blood glucose monitoring        3 times daily.   Refills:  0       oxyCODONE IR 5 MG tablet   Commonly known as:   ROXICODONE   Used for:  Calculus of kidney        Dose:  5 mg   Take 1 tablet (5 mg) by mouth every 6 hours as needed for severe pain maximum 6 tablet(s) per day   Quantity:  12 tablet   Refills:  0       PRILOSEC PO        Dose:  20 mg   Take 20 mg by mouth every morning   Refills:  0       SUDAFED PO        Take by mouth as needed for congestion   Refills:  0       SYNTHROID PO        Dose:  112 mcg   Take 112 mcg by mouth daily   Refills:  0       tamsulosin 0.4 MG capsule   Commonly known as:  FLOMAX   Used for:  Benign prostatic hyperplasia with weak urinary stream        Dose:  0.4 mg   Take 1 capsule (0.4 mg) by mouth daily 30 minutes after evening meal Do not take within 4 hours of Cialis   Quantity:  30 capsule   Refills:  11       TEARS NATURALE OP        As directed   Refills:  0       TESTOSTERONE AQUEOUS IM        Dose:  250 mg   Inject 250 mg into the muscle Every Three Weeks   Refills:  0       vitamin B complex with vitamin C Tabs tablet        Dose:  1 tablet   Take 1 tablet by mouth daily   Refills:  0       WAL-ACT 2.5-60 MG Tabs per tablet   Generic drug:  triprolidine-pseudoePHEDrine        1 po qhs for allergy   Refills:  0       ZANTAC PO        OTC- occasional after certain foods   Refills:  0         STOP taking     aspirin 325 MG tablet           Multi-vitamin Tabs tablet           OMEGA-3 FISH OIL PO                Where to get your medicines      These medications were sent to [facility] [place], [place] - [address] [place] [address]     Phone:  [phone]     sulfamethoxazole-trimethoprim 800-160 MG per tablet                Protect others around you: Learn how to safely use, store and throw away your medicines at www.disposemymeds.org.        ANTIBIOTIC INSTRUCTION     You've Been Prescribed an Antibiotic - Now What?  Your healthcare team thinks that you or your loved one might have an infection. Some infections can be treated  with antibiotics, which are powerful, life-saving drugs. Like all medications, antibiotics have side effects and should only be used when necessary. There are some important things you should know about your antibiotic treatment.      Your healthcare team may run tests before you start taking an antibiotic.    Your team may take samples (e.g., from your blood, urine or other areas) to run tests to look for bacteria. These test can be important to determine if you need an antibiotic at all and, if you do, which antibiotic will work best.      Within a few days, your healthcare team might change or even stop your antibiotic.    Your team may start you on an antibiotic while they are working to find out what is making you sick.    Your team might change your antibiotic because test results show that a different antibiotic would be better to treat your infection.    In some cases, once your team has more information, they learn that you do not need an antibiotic at all. They may find out that you don't have an infection, or that the antibiotic you're taking won't work against your infection. For example, an infection caused by a virus can't be treated with antibiotics. Staying on an antibiotic when you don't need it is more likely to be harmful than helpful.      You may experience side effects from your antibiotic.    Like all medications, antibiotics have side effects. Some of these can be serious.    Let you healthcare team know if you have any known allergies when you are admitted to the hospital.    One significant side effect of nearly all antibiotics is the risk of severe and sometimes deadly diarrhea caused by Clostridium difficile (C. Difficile). This occurs when a person takes antibiotics because some good germs are destroyed. Antibiotic use allows C. diificile to take over, putting patients at high risk for this serious infection.    As a patient or caregiver, it is important to understand your or your loved  one's antibiotic treatment. It is especially important for caregivers to speak up when patients can't speak for themselves. Here are some important questions to ask your healthcare team.    What infection is this antibiotic treating and how do you know I have that infection?    What side effects might occur from this antibiotic?    How long will I need to take this antibiotic?    Is it safe to take this antibiotic with other medications or supplements (e.g., vitamins) that I am taking?     Are there any special directions I need to know about taking this antibiotic? For example, should I take it with food?    How will I be monitored to know whether my infection is responding to the antibiotic?    What tests may help to make sure the right antibiotic is prescribed for me?      Information provided by:  www.cdc.gov/getsmart  U.S. Department of Health and Human Services  Centers for disease Control and Prevention  National Center for Emerging and Zoonotic Infectious Diseases  Division of Healthcare Quality Promotion             Medication List: This is a list of all your medications and when to take them. Check marks below indicate your daily home schedule. Keep this list as a reference.      Medications           Morning Afternoon Evening Bedtime As Needed    ALLEGRA 180 MG tablet   Take 180 mg by mouth Allergy season   Generic drug:  fexofenadine                                ATENOLOL PO   Take 25 mg by mouth daily                                ATORVASTATIN CALCIUM PO   Take 10 mg by mouth 4x week                                CIALIS 20 MG tablet   Take 20 mg by mouth as needed for erectile dysfunction   Generic drug:  tadalafil                                doxycycline Monohydrate 100 MG Tabs   Take 100 mg by mouth                                glimepiride 2 MG tablet   Commonly known as:  AMARYL   Take 2 mg by mouth every morning (before breakfast)                                glucosamine chondroitin 1500  complex Caps   Take by mouth 2 times daily                                LISINOPRIL PO   Take 2.5 mg by mouth daily                                metFORMIN 750 MG 24 hr tablet   Commonly known as:  GLUCOPHAGE-XR   Take 750 mg by mouth 2 times daily                                MINOCYCLINE HCL PO   Take 100 mg by mouth daily                                ONETOUCH VERIO IQ test strip   3 times daily.   Generic drug:  blood glucose monitoring                                oxyCODONE IR 5 MG tablet   Commonly known as:  ROXICODONE   Take 1 tablet (5 mg) by mouth every 6 hours as needed for severe pain maximum 6 tablet(s) per day                                PRILOSEC PO   Take 20 mg by mouth every morning                                SUDAFED PO   Take by mouth as needed for congestion                                sulfamethoxazole-trimethoprim 800-160 MG per tablet   Commonly known as:  BACTRIM DS/SEPTRA DS   Take 1 tablet by mouth 2 times daily for 3 days                                SYNTHROID PO   Take 112 mcg by mouth daily                                tamsulosin 0.4 MG capsule   Commonly known as:  FLOMAX   Take 1 capsule (0.4 mg) by mouth daily 30 minutes after evening meal Do not take within 4 hours of Cialis                                TEARS NATURALE OP   As directed                                TESTOSTERONE AQUEOUS IM   Inject 250 mg into the muscle Every Three Weeks                                vitamin B complex with vitamin C Tabs tablet   Take 1 tablet by mouth daily                                WAL-ACT 2.5-60 MG Tabs per tablet   1 po qhs for allergy   Generic drug:  triprolidine-pseudoePHEDrine                                ZANTAC PO   OTC- occasional after certain foods

## 2018-05-08 NOTE — IP AVS SNAPSHOT
[facility] PreOP/PostOP    [address]    [facility] [address]    Phone:  [phone]    Fax:  [phone]                                       After Visit Summary   5/8/2018    Geoffrey Benjamin    MRN: [id number]           After Visit Summary Signature Page     I have received my discharge instructions, and my questions have been answered. I have discussed any challenges I see with this plan with the nurse or doctor.    ..........................................................................................................................................  Patient/Patient Representative Signature      ..........................................................................................................................................  Patient Representative Print Name and Relationship to Patient    ..................................................               ................................................  Date                                            Time    ..........................................................................................................................................  Reviewed by Signature/Title    ...................................................              ..............................................  Date                                                            Time

## 2018-05-08 NOTE — BRIEF OP NOTE
[facility] Brief Operative Note    Pre-operative diagnosis: Left Ureteral stone   Post-operative diagnosis left ureteral calculi     Procedure: Video cystoscopy, balloon dilation left ureter, left ureteroscopy, holmium laser lithotriupsy, stone extractions, left JJ stent, EUA   Surgeon(s): Surgeon(s) and Role:  Alfonso Sinha MD - Primary   Estimated blood loss: 5cc    Specimens:   ID Type Source Tests Collected by Time Destination   1 : Left  Ureteral Stones Calculus/Stone Ureter, Left STONE ANALYSIS Alfonso Sinha MD 5/8/2018 10:20 AM       Findings: 2 large, hard stones

## 2018-05-08 NOTE — ANESTHESIA PREPROCEDURE EVALUATION
Anesthesia Evaluation     . Pt has had prior anesthetic. Type: General    History of anesthetic complications   - PONV        ROS/MED HX    ENT/Pulmonary:  - neg pulmonary ROS     Neurologic:  - neg neurologic ROS   (+)other neuro sensorineural hearing loss    Cardiovascular:     (+) hypertension--CAD, -CABG-date: 2004 4 vessel, . : . . . :. . Previous cardiac testing date:results:Stress Testdate:2013 results:Interpretation Summary  THIS IS A NORMAL STRESS ECHO AND STRESS EKG post 4 vessel CABG in 2004. There   is mild trileaflet aortic sclerosis also. date: results: date: results:          METS/Exercise Tolerance:     Hematologic:  - neg hematologic  ROS       Musculoskeletal:   (+) arthritis, , , -       GI/Hepatic:     (+) GERD Asymptomatic on medication,       Renal/Genitourinary:     (+) Nephrolithiasis ,       Endo: Comment: Class 1 obesity    (+) type II DM Not using insulin - not using insulin pump thyroid problem hypothyroidism, Obesity, .      Psychiatric:  - neg psychiatric ROS       Infectious Disease:  - neg infectious disease ROS       Malignancy:      - no malignancy   Other:    - neg other ROS                 Physical Exam  Normal systems: cardiovascular, pulmonary and dental    Airway   Mallampati: II  TM distance: >3 FB  Neck ROM: full    Dental     Cardiovascular   Rhythm and rate: regular and normal      Pulmonary    breath sounds clear to auscultation    Other findings: Lab Test        10/19/15                                         HGB          15.8           Lab Test        10/19/15                                         NA           140           POTASSIUM    4.7           CHLORIDE     105           BUN          14            CR           0.82          ANIONGAP     8             RULA          9.4           GLC          125*                  ECG diffuse non specific STTWchanges otherwise normal                Anesthesia Plan      History & Physical Review  History and physical reviewed and  following examination; no interval change.    ASA Status:  3 .    NPO Status:  > 8 hours    Plan for General and LMA with Intravenous induction. Maintenance will be Balanced.    PONV prophylaxis:  Ondansetron (or other 5HT-3) and Dexamethasone or Solumedrol       Postoperative Care  Postoperative pain management:  IV analgesics, Oral pain medications and Multi-modal analgesia.      Consents  Anesthetic plan, risks, benefits and alternatives discussed with:  Patient.  Use of blood products discussed: No .   .                          .

## 2018-05-08 NOTE — DISCHARGE INSTRUCTIONS
Take septra DS tonight with evening meal, repeat in 12 hours  Restart Multivitamin, Fish oil and aspirin in 48 hours  See Dr Sinha in 4 weeks to go over stone analysis  CYSTOSCOPY DISCHARGE INSTRUCTIONS  [facility] / UROLOGY  [facility]  [phone]    YOU MAY GO BACK TO YOUR NORMAL DIET AND ACTIVITY, UNLESS YOUR DOCTOR TELLS YOU NOT TO.    FOR THE NEXT TWO DAYS, YOU MAY NOTICE:    SOME BLOOD IN YOUR URINE.  SOME BURNING WHEN YOU URINATE (USE THE TOILET).  AN URGE TO URINATE MORE OFTEN.  BLADDER SPASMS.    THESE ARE NORMAL AFTER THE PROCEDURE.  THEY SHOULD GO AWAY AFTER A DAY OR TWO.  TO RELIEVE THESE PROBLEMS:     DRINK 6 TO 8 LARGE GLASSES OF WATER EACH DAY (INCLUDES DRINKS AT MEALS).  THIS WILL HELP CLEAR THE URINE.    TAKE WARM BATHS TO RELIEVE PAIN AND BLADDER SPASMS.  DO NOT ADD ANYTHING TO THE BATH WATER.    YOUR DOCTOR MAY PRESCRIBE PAIN MEDICINE.  YOU MAY ALSO TAKE TYLENOL (ACETAMINOPHEN) FOR PAIN.    CALL YOUR SURGEON IF YOU HAVE:    A FEVER OVER 101 DEGREES.  CHECK YOUR TEMPERATURE UNDER YOUR TONGUE.    CHILLS.    FAILURE TO URINATE (NO URINE COMES OUT WHEN YOU TRY TO USE THE TOILET).  TRY SOAKING IN A BATHTUB FULL OF WARM WATER.  IF STILL NO URINE, CALL YOUR DOCTOR.    A LOT OF BLOOD IN THE URINE, OR BLOOD CLOTS LARGER THAN A NICKEL.      PAIN IN THE BACK OR BELLY AREA (ABDOMEN).    PAIN OR SPASMS THAT ARE NOT RELIEVED BY WARM TUB BATHS AND PAIN MEDICINE.      SEVERE PAIN, BURNING OR OTHER PROBLEMS WHILE PASSING URINE.    PAIN THAT GETS WORSE AFTER TWO DAYS.     STENT INFORMATION/DISCHARGE INSTRUCTIONS  [facility] / UROLOGY  [facility]  [phone]    During surgery, a stent may be placed in the ureter.  The ureter is the tube that drains urine from the kidney to the bladder.  The stent is placed to dilate (open) the ureter so stone fragments can pass easily through the ureter or to decrease ureteral swelling after surgery or to relieve an  obstruction.      The stent is made of silicone.  The upper end of the stent curls in the kidney while the lower end rests in the bladder.    While the stent is in place you may experience the following symptoms:  Blood and/or small blood clots in the urine  Bladder spasms (frequency and urgency of urination)  Discomfort or aching in the back or side where the stent is  Burning or discomfort at the end of urine stream    To decrease these symptoms you should:  Take antispasmodic medication as prescribed (Detrol, Ditropan, etc.)  Drink plenty of fluids but avoid caffeine and citrus (include cranberry)  If you are having discomfort in back or side, decrease activity    Please call your physician or the physician on call if you experience:  Fever greater than 101 degrees  Severe pain not relieved by pain medication or rest    Please make an appointment for the removal of the stent according to your physician's instructions.             GENERAL ANESTHESIA OR SEDATION ADULT DISCHARGE INSTRUCTIONS   SPECIAL PRECAUTIONS FOR 24 HOURS AFTER SURGERY    IT IS NOT UNUSUAL TO FEEL LIGHT-HEADED OR FAINT, UP TO 24 HOURS AFTER SURGERY OR WHILE TAKING PAIN MEDICATION.  IF YOU HAVE THESE SYMPTOMS; SIT FOR A FEW MINUTES BEFORE STANDING AND HAVE SOMEONE ASSIST YOU WHEN YOU GET UP TO WALK OR USE THE BATHROOM.    YOU SHOULD REST AND RELAX FOR THE NEXT 24 HOURS AND YOU MUST MAKE ARRANGEMENTS TO HAVE SOMEONE STAY WITH YOU FOR AT LEAST 24 HOURS AFTER YOUR DISCHARGE.  AVOID HAZARDOUS AND STRENUOUS ACTIVITIES.  DO NOT MAKE IMPORTANT DECISIONS FOR 24 HOURS.    DO NOT DRIVE ANY VEHICLE OR OPERATE MECHANICAL EQUIPMENT FOR 24 HOURS FOLLOWING THE END OF YOUR SURGERY.  EVEN THOUGH YOU MAY FEEL NORMAL, YOUR REACTIONS MAY BE AFFECTED BY THE MEDICATION YOU HAVE RECEIVED.    DO NOT DRINK ALCOHOLIC BEVERAGES FOR 24 HOURS FOLLOWING YOUR SURGERY.    DRINK CLEAR LIQUIDS (APPLE JUICE, GINGER ALE, 7-UP, BROTH, ETC.).  PROGRESS TO YOUR REGULAR DIET AS YOU  FEEL ABLE.    YOU MAY HAVE A DRY MOUTH, A SORE THROAT, MUSCLES ACHES OR TROUBLE SLEEPING.  THESE SHOULD GO AWAY AFTER 24 HOURS.    CALL YOUR DOCTOR FOR ANY OF THE FOLLOWING:  SIGNS OF INFECTION (FEVER, GROWING TENDERNESS AT THE SURGERY SITE, A LARGE AMOUNT OF DRAINAGE OR BLEEDING, SEVERE PAIN, FOUL-SMELLING DRAINAGE, REDNESS OR SWELLING.    IT HAS BEEN OVER 8 TO 10 HOURS SINCE SURGERY AND YOU ARE STILL NOT ABLE TO URINATE (PASS WATER).     .You received Toradol, an IV form of ibuprofen (Motrin) at 1000*.  Do not take any ibuprofen products until 0400pm*.

## 2018-05-08 NOTE — OP NOTE
Procedure Date: 05/08/2018      DATE OF PROCEDURE:  05/08/2018      PREOPERATIVE DIAGNOSIS:  Distal left ureteral calculi.        POSTOPERATIVE DIAGNOSIS:  Distal left ureteral calculi.      PROCEDURES:     1.  Video cystoscopy.    2.  Balloon dilation left ureter.    3.  Left ureteroscopy with holmium laser lithotripsy and stone extractions.    4.  Left double-J stent insertion (4.7 Bermudian x 24 cm).   5.  EUA.      SURGEON:  Alfonso Sinha Jr., MD      ANESTHESIA:  General laryngeal mask.      ESTIMATED BLOOD LOSS:  5 mL.      INDICATIONS:  The patient is a 72-year-old male who came to see me with a history of urinary frequency and urgency and a past history of calcium urolithiasis.  It had been some time since he had had stone followup with his regular urologist.  A KUB demonstrates 2 or 3 left renal stones and 2 large stones in the distal left ureter.  He now presents for stone extraction.  The procedure, alternatives, risks and follow-up were carefully discussed.      DESCRIPTION OF THE PROCEDURE:  The patient received 2 grams of IV Ancef and was taken to cystoscopy and placed supine on the operating table.  After adequate general laryngeal mask anesthesia, the patient was placed in lithotomy position and his genitalia were prepped and draped in a sterile fashion.  A #22 Bermudian Storz cystoscope with 30 degree lens and video were used to visualize the urethra and bladder.  Water was used as an irrigant.  The urethra appeared normal and the prostatic fossa revealed some lateral lobe enlargement, no middle lobe.  The bladder was examined revealing 2+ trabeculation and no stones.  Both ureteral orifices were normal in configuration and position.  I passed a Glidewire up into the intramural ureter and met resistance.  I was able to pass the Glidewire beyond this obstruction and up to the left renal pelvis where it curled under fluoroscopy.  I then passed a 6 mm balloon catheter over the Glidewire across the  ureteral orifice and gently inflated the balloon using thumb pressure for about 20 seconds.  I deflated the balloon and removed the balloon catheter leaving the Glidewire as a safety wire.  I removed the cystoscope and passed the 6.9 Thai Storz ureteroscope into the bladder and up the left ureter and 2 large stones were encountered.  Using the 365 micron holmium laser fiber at 6 hudson, I broke both stones into pieces that were easily extracted.  These fragments were sent for stone analysis.  I passed the ureteroscope up to the proximal ureter and saw no fragments.  I removed the ureteroscope under direct vision.  I backloaded the Glidewire onto the cystoscope and passed the cystoscope back into the bladder.  A 4.7 Thai x 24 cm hydrophilic stent was then passed over the Glidewire into good position.  I removed the Glidewire and the stent curled nicely in the left renal pelvis under fluoroscopy and in the bladder and there was efflux of clear urine.  The bladder was emptied and the cystoscope removed.  Rectal exam revealed no rectal mass and a benign feeling prostate gland.      The patient tolerated the procedure and went to the recovery room in stable condition.  He will be discharged on Septra-DS for 1 dose tonight, to be repeated in the morning.  He will follow up with me in 4 weeks to review his stone analysis and discuss followup for lithotripsy for his left renal stones.  The patient will resume his aspirin, fish oil and multivitamin in 48 hours.         VERONICA JOE JR, MD             D: 2018   T: 2018   MT: JHONY      Name:     AMARIS CHILDRESS   MRN:      [id number]        Account:        [id number]   :      1945           Procedure Date: 2018      Document: [id number]       cc: Kike Ng MD

## 2018-05-08 NOTE — ANESTHESIA CARE TRANSFER NOTE
Patient: Geoffrey Benjamin    Procedure(s):  Video Cystoscopy, left ureteroscopy,holmium laser lithotripsy stone extraction insert left JJ stent,  EUA - Wound Class: II-Clean Contaminated    Diagnosis: Left Ureteral stone  Diagnosis Additional Information: No value filed.    Anesthesia Type:   General, LMA     Note:  Airway :Room Air  Patient transferred to:PACU  Comments: Patient with spontaneous respirations and adequate tidal volumes. Patient awake and responsive. LMA removed in OR. To PACU on RA ventilating well. VSS. Report given.Handoff Report: Identifed the Patient, Identified the Reponsible Provider, Reviewed the pertinent medical history, Discussed the surgical course, Reviewed Intra-OP anesthesia mangement and issues during anesthesia, Set expectations for post-procedure period and Allowed opportunity for questions and acknowledgement of understanding      Vitals: (Last set prior to Anesthesia Care Transfer)    CRNA VITALS  5/8/2018 1018 - 5/8/2018 1055      5/8/2018             NIBP: (!)  148/111    NIBP Mean: 128                Electronically Signed By: LUCILLE Manuel CRNA  May 8, 2018  10:55 AM

## 2018-05-08 NOTE — ANESTHESIA POSTPROCEDURE EVALUATION
Patient: Geoffrey Benjamin    Procedure(s):  Video Cystoscopy, left ureteroscopy,holmium laser lithotripsy stone extraction insert left JJ stent,  EUA - Wound Class: II-Clean Contaminated    Diagnosis:Left Ureteral stone  Diagnosis Additional Information: Left ureteral stone  Dr. Sinha    Anesthesia Type:  General, LMA    Note:  Anesthesia Post Evaluation    Patient location during evaluation: PACU  Patient participation: Able to fully participate in evaluation  Level of consciousness: awake  Pain management: adequate  Airway patency: patent  Cardiovascular status: acceptable  Respiratory status: acceptable  Hydration status: acceptable  PONV: none             Last vitals:  Vitals:    05/08/18 1105 05/08/18 1125 05/08/18 1201   BP: 114/71 117/73 121/81   Pulse:      Resp: 11 12 16   Temp:  97.6  F (36.4  C) 97.7  F (36.5  C)   SpO2: 98% 98% 96%         Electronically Signed By: Luigi Reed MD  May 8, 2018  12:19 PM

## 2018-05-13 LAB
APPEARANCE STONE: NORMAL
COMPN STONE: NORMAL
NUMBER STONE: NORMAL
SIZE STONE: NORMAL MM
WT STONE: 136 MG

## 2018-05-15 ENCOUNTER — OFFICE VISIT (OUTPATIENT)
Dept: UROLOGY | Facility: CLINIC | Age: 73
End: 2018-05-15
Payer: COMMERCIAL

## 2018-05-15 VITALS — HEIGHT: 66 IN | WEIGHT: 194 LBS | BODY MASS INDEX: 31.18 KG/M2 | HEART RATE: 92 BPM | OXYGEN SATURATION: 98 %

## 2018-05-15 DIAGNOSIS — N20.0 CALCULUS OF KIDNEY: Primary | ICD-10-CM

## 2018-05-15 PROCEDURE — 52310 CYSTOSCOPY AND TREATMENT: CPT | Performed by: UROLOGY

## 2018-05-15 RX ORDER — CIPROFLOXACIN 500 MG/1
500 TABLET, FILM COATED ORAL ONCE
Qty: 1 TABLET | Refills: 0 | Status: SHIPPED | OUTPATIENT
Start: 2018-05-15 | End: 2018-05-15

## 2018-05-15 ASSESSMENT — PAIN SCALES - GENERAL: PAINLEVEL: NO PAIN (0)

## 2018-05-15 NOTE — PATIENT INSTRUCTIONS
"     AFTER YOUR CYSTOSCOPY         You have just completed a cystoscopy, or \"cysto\", which allowed your physician to learn more about your bladder (or to remove a stent placed after surgery). We suggest that you continue to avoid caffeine, fruit juice, and alcohol for the next 24 hours, however, you are encouraged to return to your normal activities.       A few things that are considered normal after your cystoscopy:    * small amount of bleeding (or spotting) that clears within the next 24 hours    * slight burning sensation with urination    * sensation to of needing to avoid more frequently    * the feeling of \"air\" in your urine    * mild discomfort that is relieved with Tylonol        Please contact our office promptly if you:    * develop a fever above 101 degrees    * are unable to urinate    * develop bright red blood that does not stop    * severe pain or swelling        And of course, please contact our office with any concerns or questions [phone]        "

## 2018-05-15 NOTE — MR AVS SNAPSHOT
"              After Visit Summary   5/15/2018    Geoffrey Benjamin    MRN: [id number]           Patient Information     Date Of Birth          1945        Visit Information        Provider Department      5/15/2018 8:20 AM Alfonso Sinha MD; [facility] [place]        Today's Diagnoses     Calculus of kidney    -  1      Care Instructions         AFTER YOUR CYSTOSCOPY         You have just completed a cystoscopy, or \"cysto\", which allowed your physician to learn more about your bladder (or to remove a stent placed after surgery). We suggest that you continue to avoid caffeine, fruit juice, and alcohol for the next 24 hours, however, you are encouraged to return to your normal activities.       A few things that are considered normal after your cystoscopy:    * small amount of bleeding (or spotting) that clears within the next 24 hours    * slight burning sensation with urination    * sensation to of needing to avoid more frequently    * the feeling of \"air\" in your urine    * mild discomfort that is relieved with Tylonol        Please contact our office promptly if you:    * develop a fever above 101 degrees    * are unable to urinate    * develop bright red blood that does not stop    * severe pain or swelling        And of course, please contact our office with any concerns or questions [phone]                Follow-ups after your visit        Follow-up notes from your care team     Return in about 6 months (around 11/15/2018) for KUB.      Who to contact     If you have questions or need follow up information about today's clinic visit or your schedule please contact [facility] [place] directly at [phone].  Normal or non-critical lab and imaging results will be communicated to you by MyChart, letter or phone within 4 business days after the clinic has received the results. If you do not hear from us within 7 days, " "please contact the clinic through [employer] or phone. If you have a critical or abnormal lab result, we will notify you by phone as soon as possible.  Submit refill requests through [employer] or call your pharmacy and they will forward the refill request to us. Please allow 3 business days for your refill to be completed.          Additional Information About Your Visit        Goome[employer] Information     Bomb[employer] lets you send messages to your doctor, view your test results, renew your prescriptions, schedule appointments and more. To sign up, go to [email or web address] . Click on \"Log in\" on the left side of the screen, which will take you to the Welcome page. Then click on \"Sign up Now\" on the right side of the page.     You will be asked to enter the access code listed below, as well as some personal information. Please follow the directions to create your username and password.     Your access code is: [email or web address]  Expires: 2018  3:34 PM     Your access code will  in 90 days. If you need help or a new code, please call your [facility] clinic or [phone].        Care EveryWhere ID     This is your Care EveryWhere ID. This could be used by other organizations to access your [facility] medical records  [id number]        Your Vitals Were     Pulse Height Pulse Oximetry BMI (Body Mass Index)          92 1.676 m (5' 6\") 98% 31.31 kg/m2         Blood Pressure from Last 3 Encounters:   18 121/79   18 102/66   17 130/82    Weight from Last 3 Encounters:   05/15/18 88 kg (194 lb)   18 88 kg (194 lb)   18 84.8 kg (187 lb)              We Performed the Following     CYSTOSCOPY W FOREIGN BODY REMOVAL, SIMPLE (63736)          Today's Medication Changes          These changes are accurate as of 5/15/18  8:53 AM.  If you have any questions, ask your nurse or doctor.               Start taking these medicines.        Dose/Directions    ciprofloxacin 500 MG tablet   Commonly known as:  CIPRO "   Used for:  Calculus of kidney   Started by:  Alfonso Sinha MD        Dose:  500 mg   Take 1 tablet (500 mg) by mouth once for 1 dose   Quantity:  1 tablet   Refills:  0            Where to get your medicines      These medications were sent to [id number] - [place] - [address] AT [place] [address]  [address] [place] [address]     Phone:  [phone]     ciprofloxacin 500 MG tablet                Primary Care Provider Office Phone # Fax #    Kike Ng [phone]       [facility]  [address]  [facility] [address]        Equal Access to Services     [facility]: Hadii lary singletary hadasho Sojing, waaxda luqadaha, qaybta kaalmada adejudieyada, igor hall . So [facility] [phone].    ATENCIÓN: Si habla español, tiene a ch disposición servicios gratuitos de asistencia lingüística. Llame al [phone].    We comply with applicable federal civil rights laws and [place] laws. We do not discriminate on the basis of race, color, national origin, age, disability, sex, sexual orientation, or gender identity.            Thank you!     Thank you for choosing [facility] [place]  for your care. Our goal is always to provide you with excellent care. Hearing back from our patients is one way we can continue to improve our services. Please take a few minutes to complete the written survey that you may receive in the mail after your visit with us. Thank you!             Your Updated Medication List - Protect others around you: Learn how to safely use, store and throw away your medicines at www.disposemymeds.org.          This list is accurate as of 5/15/18  8:53 AM.  Always use your most recent med list.                   Brand Name Dispense Instructions for use Diagnosis    ALLEGRA 180 MG tablet   Generic drug:  fexofenadine      Take 180 mg by mouth Allergy season        ATENOLOL PO      Take 25  mg by mouth daily        ATORVASTATIN CALCIUM PO      Take 10 mg by mouth 4x week        CIALIS 20 MG tablet   Generic drug:  tadalafil      Take 20 mg by mouth as needed for erectile dysfunction        ciprofloxacin 500 MG tablet    CIPRO    1 tablet    Take 1 tablet (500 mg) by mouth once for 1 dose    Calculus of kidney       doxycycline Monohydrate 100 MG Tabs      Take 100 mg by mouth        glimepiride 2 MG tablet    AMARYL     Take 2 mg by mouth every morning (before breakfast)        glucosamine chondroitin 1500 complex Caps      Take by mouth 2 times daily        LISINOPRIL PO      Take 2.5 mg by mouth daily        metFORMIN 750 MG 24 hr tablet    GLUCOPHAGE-XR     Take 750 mg by mouth 2 times daily        MINOCYCLINE HCL PO      Take 100 mg by mouth daily        ONETOUCH VERIO IQ test strip   Generic drug:  blood glucose monitoring      3 times daily.        oxyCODONE IR 5 MG tablet    ROXICODONE    12 tablet    Take 1 tablet (5 mg) by mouth every 6 hours as needed for severe pain maximum 6 tablet(s) per day    Calculus of kidney       PRILOSEC PO      Take 20 mg by mouth every morning        SUDAFED PO      Take by mouth as needed for congestion        SYNTHROID PO      Take 112 mcg by mouth daily        tamsulosin 0.4 MG capsule    FLOMAX    30 capsule    Take 1 capsule (0.4 mg) by mouth daily 30 minutes after evening meal Do not take within 4 hours of Cialis    Benign prostatic hyperplasia with weak urinary stream       TEARS NATURALE OP      As directed        TESTOSTERONE AQUEOUS IM      Inject 250 mg into the muscle Every Three Weeks        vitamin B complex with vitamin C Tabs tablet      Take 1 tablet by mouth daily        WAL-ACT 2.5-60 MG Tabs per tablet   Generic drug:  triprolidine-pseudoePHEDrine      1 po qhs for allergy        ZANTAC PO      OTC- occasional after certain foods

## 2018-05-15 NOTE — LETTER
5/15/2018       RE: Geoffrey Benjamin  [address] [address]     Dear Colleague,    Thank you for referring your patient, Geoffrey Benjamin, to the [facility] [place] at [facility]. Please see a copy of my visit note below.    Geoffrey Benjamin is a 72-year-old male with a history of calcium oxalate monohydrate stones and hypogonadism. He has yearly PSA and digital rectal exams with his primary care physician. He recently underwent ureteroscopic stone extraction and is here for double-J stent removal.  Flexible cystoscopy-normal urethra, lateral lobes of prostate enlarged, double-J stent removed easily. Urine clear  Assessment: Calcium oxalate monohydrate stones with 3 left renal stones remaining.  Plan: Antibiotic ×1 today. Referred to nephrology for metabolic stone evaluation. Flexible ureteroscopy with holmium laser lithotripsy of remaining renal stones-these stones are too hard to treat with shockwave lithotripsy.  KUB in 6 months    Again, thank you for allowing me to participate in the care of your patient.      Sincerely,    Alfonso Sinha MD

## 2018-05-15 NOTE — LETTER
5/15/2018      RE: Geoffrey Benjamin  [address] [address]       Geoffrey Benjamin is a 72-year-old male with a history of calcium oxalate monohydrate stones and hypogonadism. He has yearly PSA and digital rectal exams with his primary care physician. He recently underwent ureteroscopic stone extraction and is here for double-J stent removal.  Flexible cystoscopy-normal urethra, lateral lobes of prostate enlarged, double-J stent removed easily. Urine clear  Assessment: Calcium oxalate monohydrate stones with 3 left renal stones remaining.  Plan: Antibiotic ×1 today. Referred to nephrology for metabolic stone evaluation. Flexible ureteroscopy with holmium laser lithotripsy of remaining renal stones-these stones are too hard to treat with shockwave lithotripsy.  KUB in 6 months    Alfonso Sinha MD

## 2018-05-15 NOTE — PROGRESS NOTES
Geoffrey Benjamin is a 72-year-old male with a history of calcium oxalate monohydrate stones and hypogonadism. He has yearly PSA and digital rectal exams with his primary care physician. He recently underwent ureteroscopic stone extraction and is here for double-J stent removal.  Flexible cystoscopy-normal urethra, lateral lobes of prostate enlarged, double-J stent removed easily. Urine clear  Assessment: Calcium oxalate monohydrate stones with 3 left renal stones remaining.  Plan: Antibiotic ×1 today. Referred to nephrology for metabolic stone evaluation. Flexible ureteroscopy with holmium laser lithotripsy of remaining renal stones-these stones are too hard to treat with shockwave lithotripsy.  KUB in 6 months

## 2018-05-16 DIAGNOSIS — N20.0 CALCULUS OF LEFT KIDNEY: Primary | ICD-10-CM

## 2018-05-16 RX ORDER — CEFAZOLIN SODIUM 1 G
1 VIAL (EA) INJECTION SEE ADMIN INSTRUCTIONS
Status: CANCELLED | OUTPATIENT
Start: 2018-05-16

## 2018-05-30 ENCOUNTER — TELEPHONE (OUTPATIENT)
Dept: UROLOGY | Facility: CLINIC | Age: 73
End: 2018-05-30

## 2018-05-30 NOTE — TELEPHONE ENCOUNTER
Pt calling and wondering if he can get a cortisone shot in his knee before his ESWL.  I told him it is ok to get the shot before the ESWL.  ALISE Collazo, CMA

## 2018-06-05 ENCOUNTER — ANESTHESIA EVENT (OUTPATIENT)
Dept: SURGERY | Facility: CLINIC | Age: 73
End: 2018-06-05
Payer: MEDICARE

## 2018-06-05 ENCOUNTER — ANESTHESIA (OUTPATIENT)
Dept: SURGERY | Facility: CLINIC | Age: 73
End: 2018-06-05
Payer: MEDICARE

## 2018-06-05 ENCOUNTER — APPOINTMENT (OUTPATIENT)
Dept: GENERAL RADIOLOGY | Facility: CLINIC | Age: 73
End: 2018-06-05
Attending: UROLOGY
Payer: MEDICARE

## 2018-06-05 ENCOUNTER — SURGERY (OUTPATIENT)
Age: 73
End: 2018-06-05

## 2018-06-05 ENCOUNTER — HOSPITAL ENCOUNTER (OUTPATIENT)
Facility: CLINIC | Age: 73
Discharge: HOME | End: 2018-06-05
Attending: UROLOGY | Admitting: UROLOGY
Payer: MEDICARE

## 2018-06-05 VITALS
DIASTOLIC BLOOD PRESSURE: 72 MMHG | HEIGHT: 66 IN | BODY MASS INDEX: 30.53 KG/M2 | WEIGHT: 190 LBS | RESPIRATION RATE: 14 BRPM | HEART RATE: 68 BPM | TEMPERATURE: 98 F | SYSTOLIC BLOOD PRESSURE: 122 MMHG | OXYGEN SATURATION: 98 %

## 2018-06-05 DIAGNOSIS — N20.0 CALCULUS OF LEFT KIDNEY: ICD-10-CM

## 2018-06-05 LAB
COPATH REPORT: NORMAL
GLUCOSE BLDC GLUCOMTR-MCNC: 137 MG/DL (ref 70–99)
GLUCOSE BLDC GLUCOMTR-MCNC: 152 MG/DL (ref 70–99)

## 2018-06-05 PROCEDURE — 25000128 H RX IP 250 OP 636: Performed by: ANESTHESIOLOGY

## 2018-06-05 PROCEDURE — 25000128 H RX IP 250 OP 636

## 2018-06-05 PROCEDURE — C1894 INTRO/SHEATH, NON-LASER: HCPCS | Performed by: UROLOGY

## 2018-06-05 PROCEDURE — 36000060 ZZH SURGERY LEVEL 3 W FLUORO 1ST 30 MIN: Performed by: UROLOGY

## 2018-06-05 PROCEDURE — 71000013 ZZH RECOVERY PHASE 1 LEVEL 1 EA ADDTL HR: Performed by: UROLOGY

## 2018-06-05 PROCEDURE — A9270 NON-COVERED ITEM OR SERVICE: HCPCS | Mod: GY | Performed by: ANESTHESIOLOGY

## 2018-06-05 PROCEDURE — 36000058 ZZH SURGERY LEVEL 3 EA 15 ADDTL MIN: Performed by: UROLOGY

## 2018-06-05 PROCEDURE — 25000125 ZZHC RX 250

## 2018-06-05 PROCEDURE — 25000566 ZZH SEVOFLURANE, EA 15 MIN: Performed by: UROLOGY

## 2018-06-05 PROCEDURE — 88300 SURGICAL PATH GROSS: CPT | Performed by: UROLOGY

## 2018-06-05 PROCEDURE — 71000027 ZZH RECOVERY PHASE 2 EACH 15 MINS: Performed by: UROLOGY

## 2018-06-05 PROCEDURE — 74420 UROGRAPHY RTRGR +-KUB: CPT | Mod: 26 | Performed by: UROLOGY

## 2018-06-05 PROCEDURE — 37000008 ZZH ANESTHESIA TECHNICAL FEE, 1ST 30 MIN: Performed by: UROLOGY

## 2018-06-05 PROCEDURE — 25000132 ZZH RX MED GY IP 250 OP 250 PS 637: Mod: GY | Performed by: ANESTHESIOLOGY

## 2018-06-05 PROCEDURE — 27210794 ZZH OR GENERAL SUPPLY STERILE: Performed by: UROLOGY

## 2018-06-05 PROCEDURE — 40000306 ZZH STATISTIC PRE PROC ASSESS II: Performed by: UROLOGY

## 2018-06-05 PROCEDURE — C2617 STENT, NON-COR, TEM W/O DEL: HCPCS | Performed by: UROLOGY

## 2018-06-05 PROCEDURE — 27210995 ZZH RX 272: Performed by: UROLOGY

## 2018-06-05 PROCEDURE — C1769 GUIDE WIRE: HCPCS | Performed by: UROLOGY

## 2018-06-05 PROCEDURE — C1758 CATHETER, URETERAL: HCPCS | Performed by: UROLOGY

## 2018-06-05 PROCEDURE — 82365 CALCULUS SPECTROSCOPY: CPT | Performed by: UROLOGY

## 2018-06-05 PROCEDURE — 71000012 ZZH RECOVERY PHASE 1 LEVEL 1 FIRST HR: Performed by: UROLOGY

## 2018-06-05 PROCEDURE — 37000009 ZZH ANESTHESIA TECHNICAL FEE, EACH ADDTL 15 MIN: Performed by: UROLOGY

## 2018-06-05 PROCEDURE — 25000128 H RX IP 250 OP 636: Performed by: UROLOGY

## 2018-06-05 PROCEDURE — 88300 SURGICAL PATH GROSS: CPT | Mod: 26 | Performed by: UROLOGY

## 2018-06-05 PROCEDURE — 52356 CYSTO/URETERO W/LITHOTRIPSY: CPT | Mod: LT | Performed by: UROLOGY

## 2018-06-05 PROCEDURE — 82962 GLUCOSE BLOOD TEST: CPT | Mod: 91

## 2018-06-05 PROCEDURE — 40000277 XR SURGERY CARM FLUORO LESS THAN 5 MIN W STILLS: Mod: TC

## 2018-06-05 PROCEDURE — 74019 RADEX ABDOMEN 2 VIEWS: CPT

## 2018-06-05 DEVICE — STENT URETERAL PERCUFLEX PLUS 6FRX26CM M0061752630: Type: IMPLANTABLE DEVICE | Site: URETER | Status: FUNCTIONAL

## 2018-06-05 RX ORDER — OXYCODONE HYDROCHLORIDE 5 MG/1
5 TABLET ORAL ONCE
Status: COMPLETED | OUTPATIENT
Start: 2018-06-05 | End: 2018-06-05

## 2018-06-05 RX ORDER — ONDANSETRON 2 MG/ML
INJECTION INTRAMUSCULAR; INTRAVENOUS PRN
Status: DISCONTINUED | OUTPATIENT
Start: 2018-06-05 | End: 2018-06-05

## 2018-06-05 RX ORDER — FENTANYL CITRATE 50 UG/ML
25-50 INJECTION, SOLUTION INTRAMUSCULAR; INTRAVENOUS
Status: DISCONTINUED | OUTPATIENT
Start: 2018-06-05 | End: 2018-06-05 | Stop reason: HOSPADM

## 2018-06-05 RX ORDER — FENTANYL CITRATE 50 UG/ML
INJECTION, SOLUTION INTRAMUSCULAR; INTRAVENOUS PRN
Status: DISCONTINUED | OUTPATIENT
Start: 2018-06-05 | End: 2018-06-05

## 2018-06-05 RX ORDER — SODIUM CHLORIDE, SODIUM LACTATE, POTASSIUM CHLORIDE, CALCIUM CHLORIDE 600; 310; 30; 20 MG/100ML; MG/100ML; MG/100ML; MG/100ML
INJECTION, SOLUTION INTRAVENOUS CONTINUOUS
Status: DISCONTINUED | OUTPATIENT
Start: 2018-06-05 | End: 2018-06-05 | Stop reason: HOSPADM

## 2018-06-05 RX ORDER — ONDANSETRON 4 MG/1
4 TABLET, ORALLY DISINTEGRATING ORAL EVERY 30 MIN PRN
Status: DISCONTINUED | OUTPATIENT
Start: 2018-06-05 | End: 2018-06-05 | Stop reason: HOSPADM

## 2018-06-05 RX ORDER — NALOXONE HYDROCHLORIDE 0.4 MG/ML
.1-.4 INJECTION, SOLUTION INTRAMUSCULAR; INTRAVENOUS; SUBCUTANEOUS
Status: DISCONTINUED | OUTPATIENT
Start: 2018-06-05 | End: 2018-06-05 | Stop reason: HOSPADM

## 2018-06-05 RX ORDER — ONDANSETRON 2 MG/ML
4 INJECTION INTRAMUSCULAR; INTRAVENOUS EVERY 30 MIN PRN
Status: DISCONTINUED | OUTPATIENT
Start: 2018-06-05 | End: 2018-06-05 | Stop reason: HOSPADM

## 2018-06-05 RX ORDER — LIDOCAINE 40 MG/G
CREAM TOPICAL
Status: DISCONTINUED | OUTPATIENT
Start: 2018-06-05 | End: 2018-06-05 | Stop reason: HOSPADM

## 2018-06-05 RX ORDER — AMOXICILLIN 250 MG
1-2 CAPSULE ORAL 2 TIMES DAILY PRN
Qty: 20 TABLET | Refills: 0 | Status: SHIPPED | OUTPATIENT
Start: 2018-06-05 | End: 2018-11-20

## 2018-06-05 RX ORDER — PROPOFOL 10 MG/ML
INJECTION, EMULSION INTRAVENOUS PRN
Status: DISCONTINUED | OUTPATIENT
Start: 2018-06-05 | End: 2018-06-05

## 2018-06-05 RX ORDER — DEXAMETHASONE SODIUM PHOSPHATE 4 MG/ML
INJECTION, SOLUTION INTRA-ARTICULAR; INTRALESIONAL; INTRAMUSCULAR; INTRAVENOUS; SOFT TISSUE PRN
Status: DISCONTINUED | OUTPATIENT
Start: 2018-06-05 | End: 2018-06-05

## 2018-06-05 RX ORDER — MEPERIDINE HYDROCHLORIDE 50 MG/ML
12.5 INJECTION INTRAMUSCULAR; INTRAVENOUS; SUBCUTANEOUS
Status: DISCONTINUED | OUTPATIENT
Start: 2018-06-05 | End: 2018-06-05 | Stop reason: HOSPADM

## 2018-06-05 RX ORDER — HYDROMORPHONE HYDROCHLORIDE 1 MG/ML
.3-.5 INJECTION, SOLUTION INTRAMUSCULAR; INTRAVENOUS; SUBCUTANEOUS EVERY 10 MIN PRN
Status: DISCONTINUED | OUTPATIENT
Start: 2018-06-05 | End: 2018-06-05 | Stop reason: HOSPADM

## 2018-06-05 RX ORDER — OXYCODONE HYDROCHLORIDE 5 MG/1
5-10 TABLET ORAL
Qty: 15 TABLET | Refills: 0 | Status: SHIPPED | OUTPATIENT
Start: 2018-06-05 | End: 2018-11-20

## 2018-06-05 RX ORDER — LIDOCAINE HYDROCHLORIDE 10 MG/ML
INJECTION, SOLUTION INFILTRATION; PERINEURAL PRN
Status: DISCONTINUED | OUTPATIENT
Start: 2018-06-05 | End: 2018-06-05

## 2018-06-05 RX ORDER — SULFAMETHOXAZOLE/TRIMETHOPRIM 800-160 MG
1 TABLET ORAL 2 TIMES DAILY
Qty: 6 TABLET | Refills: 0 | Status: SHIPPED | OUTPATIENT
Start: 2018-06-05 | End: 2018-06-08

## 2018-06-05 RX ORDER — HYDRALAZINE HYDROCHLORIDE 20 MG/ML
2.5-5 INJECTION INTRAMUSCULAR; INTRAVENOUS EVERY 10 MIN PRN
Status: DISCONTINUED | OUTPATIENT
Start: 2018-06-05 | End: 2018-06-05 | Stop reason: HOSPADM

## 2018-06-05 RX ADMIN — SODIUM CHLORIDE, POTASSIUM CHLORIDE, SODIUM LACTATE AND CALCIUM CHLORIDE: 600; 310; 30; 20 INJECTION, SOLUTION INTRAVENOUS at 08:40

## 2018-06-05 RX ADMIN — MIDAZOLAM 2 MG: 1 INJECTION INTRAMUSCULAR; INTRAVENOUS at 09:15

## 2018-06-05 RX ADMIN — LIDOCAINE HYDROCHLORIDE 50 MG: 10 INJECTION, SOLUTION INFILTRATION; PERINEURAL at 09:17

## 2018-06-05 RX ADMIN — WATER 40 ML GIVEN: 100 IRRIGANT IRRIGATION at 10:32

## 2018-06-05 RX ADMIN — SODIUM CHLORIDE, POTASSIUM CHLORIDE, SODIUM LACTATE AND CALCIUM CHLORIDE: 600; 310; 30; 20 INJECTION, SOLUTION INTRAVENOUS at 10:00

## 2018-06-05 RX ADMIN — FENTANYL CITRATE 100 MCG: 50 INJECTION, SOLUTION INTRAMUSCULAR; INTRAVENOUS at 09:17

## 2018-06-05 RX ADMIN — FENTANYL CITRATE 50 MCG: 50 INJECTION INTRAMUSCULAR; INTRAVENOUS at 11:36

## 2018-06-05 RX ADMIN — OXYCODONE HYDROCHLORIDE 5 MG: 5 TABLET ORAL at 12:02

## 2018-06-05 RX ADMIN — PROPOFOL 160 MG: 10 INJECTION, EMULSION INTRAVENOUS at 09:17

## 2018-06-05 RX ADMIN — SODIUM CHLORIDE, POTASSIUM CHLORIDE, SODIUM LACTATE AND CALCIUM CHLORIDE: 600; 310; 30; 20 INJECTION, SOLUTION INTRAVENOUS at 11:43

## 2018-06-05 RX ADMIN — DEXAMETHASONE SODIUM PHOSPHATE 4 MG: 4 INJECTION, SOLUTION INTRA-ARTICULAR; INTRALESIONAL; INTRAMUSCULAR; INTRAVENOUS; SOFT TISSUE at 09:45

## 2018-06-05 RX ADMIN — ONDANSETRON 4 MG: 2 INJECTION INTRAMUSCULAR; INTRAVENOUS at 09:45

## 2018-06-05 NOTE — ANESTHESIA CARE TRANSFER NOTE
Patient: Geoffrey Benjamin    Procedure(s):  Video Cystoscopy Left flexible ureteroscopy with Holmium Laser, retrogrades, stone extraction, stent placement - Wound Class: II-Clean Contaminated    Diagnosis: Stones   Diagnosis Additional Information: No value filed.    Anesthesia Type:   General, ETT     Note:  Airway :Room Air  Patient transferred to:PACU  Comments: Pt to PACU, report to RN VSS      Vitals: (Last set prior to Anesthesia Care Transfer)    CRNA VITALS  6/5/2018 1010 - 6/5/2018 1047      6/5/2018             Resp Rate (observed): 10                Electronically Signed By: LUCILLE Plaza CRNA  June 5, 2018  10:47 AM

## 2018-06-05 NOTE — IP AVS SNAPSHOT
MRN:[id number]                      After Visit Summary   6/5/2018    Geoffrey Benjamin    MRN: [id number]           Thank you!     Thank you for choosing [facility] for your care. Our goal is always to provide you with excellent care. Hearing back from our patients is one way we can continue to improve our services. Please take a few minutes to complete the written survey that you may receive in the mail after you visit. If you would like to speak to someone directly about your visit please contact Patient Relations at [phone]. Thank you!          Patient Information     Date Of Birth          1945        About your hospital stay     You were admitted on:  June 5, 2018 You last received care in the:  [facility] PreOP/PostOP    You were discharged on:  June 5, 2018       Who to Call     For medical emergencies, please call 911.  For non-urgent questions about your medical care, please call your primary care provider or clinic, [phone]  For questions related to your surgery, please call your surgery clinic        Attending Provider     Provider Specialty    Romeo Cheng MD Urology       Primary Care Provider Office Phone # Fax #    Kike Ng [phone]      After Care Instructions     Diet Instructions       Resume pre procedure diet            Discharge Instructions       Patient to arrange for follow up appointment in 2-3 weeks for cystoscopy and stent removal.            Encourage fluids       Encourage fluids at home to keep urine clear to light pink            No Alcohol       for 24 hours post procedure            No driving or operating machinery        until the day after procedure                  Your next 10 appointments already scheduled     Jun 22, 2018  8:00 AM CDT   Cystoscopy with Alfonso Sinha MD, [facility] ([facility])    [address]  [address]  [facility] [id number] [phone]              Further instructions from your care team       DR. THOMAS HELTON M.D.         CLINIC PHONE NUMBER:  [phone]    GENERAL ANESTHESIA OR SEDATION ADULT DISCHARGE INSTRUCTIONS   SPECIAL PRECAUTIONS FOR 24 HOURS AFTER SURGERY    IT IS NOT UNUSUAL TO FEEL LIGHT-HEADED OR FAINT, UP TO 24 HOURS AFTER SURGERY OR WHILE TAKING PAIN MEDICATION.  IF YOU HAVE THESE SYMPTOMS; SIT FOR A FEW MINUTES BEFORE STANDING AND HAVE SOMEONE ASSIST YOU WHEN YOU GET UP TO WALK OR USE THE BATHROOM.    YOU SHOULD REST AND RELAX FOR THE NEXT 24 HOURS AND YOU MUST MAKE ARRANGEMENTS TO HAVE SOMEONE STAY WITH YOU FOR AT LEAST 24 HOURS AFTER YOUR DISCHARGE.  AVOID HAZARDOUS AND STRENUOUS ACTIVITIES.  DO NOT MAKE IMPORTANT DECISIONS FOR 24 HOURS.    DO NOT DRIVE ANY VEHICLE OR OPERATE MECHANICAL EQUIPMENT FOR 24 HOURS FOLLOWING THE END OF YOUR SURGERY.  EVEN THOUGH YOU MAY FEEL NORMAL, YOUR REACTIONS MAY BE AFFECTED BY THE MEDICATION YOU HAVE RECEIVED.    DO NOT DRINK ALCOHOLIC BEVERAGES FOR 24 HOURS FOLLOWING YOUR SURGERY.    DRINK CLEAR LIQUIDS (APPLE JUICE, GINGER ALE, 7-UP, BROTH, ETC.).  PROGRESS TO YOUR REGULAR DIET AS YOU FEEL ABLE.    YOU MAY HAVE A DRY MOUTH, A SORE THROAT, MUSCLES ACHES OR TROUBLE SLEEPING.  THESE SHOULD GO AWAY AFTER 24 HOURS.    CALL YOUR DOCTOR FOR ANY OF THE FOLLOWING:  SIGNS OF INFECTION (FEVER, GROWING TENDERNESS AT THE SURGERY SITE, A LARGE AMOUNT OF DRAINAGE OR BLEEDING, SEVERE PAIN, FOUL-SMELLING DRAINAGE, REDNESS OR SWELLING.    IT HAS BEEN OVER 8 TO 10 HOURS SINCE SURGERY AND YOU ARE STILL NOT ABLE TO URINATE (PASS WATER).     CYSTOSCOPY DISCHARGE INSTRUCTIONS  [facility] / UROLOGY  [facility]  [phone]    YOU MAY GO BACK TO YOUR NORMAL DIET AND ACTIVITY, UNLESS YOUR DOCTOR TELLS YOU NOT TO.    FOR THE NEXT TWO DAYS, YOU MAY NOTICE:    SOME BLOOD IN YOUR URINE.  SOME BURNING WHEN YOU URINATE (USE THE  TOILET).  AN URGE TO URINATE MORE OFTEN.  BLADDER SPASMS.    THESE ARE NORMAL AFTER THE PROCEDURE.  THEY SHOULD GO AWAY AFTER A DAY OR TWO.  TO RELIEVE THESE PROBLEMS:     DRINK 6 TO 8 LARGE GLASSES OF WATER EACH DAY (INCLUDES DRINKS AT MEALS).  THIS WILL HELP CLEAR THE URINE.    TAKE WARM BATHS TO RELIEVE PAIN AND BLADDER SPASMS.  DO NOT ADD ANYTHING TO THE BATH WATER.    YOUR DOCTOR MAY PRESCRIBE PAIN MEDICINE.  YOU MAY ALSO TAKE TYLENOL (ACETAMINOPHEN) FOR PAIN.    CALL YOUR SURGEON IF YOU HAVE:    A FEVER OVER 101 DEGREES.  CHECK YOUR TEMPERATURE UNDER YOUR TONGUE.    CHILLS.    FAILURE TO URINATE (NO URINE COMES OUT WHEN YOU TRY TO USE THE TOILET).  TRY SOAKING IN A BATHTUB FULL OF WARM WATER.  IF STILL NO URINE, CALL YOUR DOCTOR.    A LOT OF BLOOD IN THE URINE, OR BLOOD CLOTS LARGER THAN A NICKEL.      PAIN IN THE BACK OR BELLY AREA (ABDOMEN).    PAIN OR SPASMS THAT ARE NOT RELIEVED BY WARM TUB BATHS AND PAIN MEDICINE.      SEVERE PAIN, BURNING OR OTHER PROBLEMS WHILE PASSING URINE.    PAIN THAT GETS WORSE AFTER TWO DAYS.        STENT INFORMATION/DISCHARGE INSTRUCTIONS   o [facility] / UROLOGY  [facility]  [phone]    During surgery, a stent may be placed in the ureter.  The ureter is the tube that drains urine from the kidney to the bladder.  The stent is placed to dilate (open) the ureter so stone fragments can pass easily through the ureter or to decrease ureteral swelling after surgery or to relieve an obstruction.      The stent is made of silicone.  The upper end of the stent curls in the kidney while the lower end rests in the bladder.    While the stent is in place you may experience the following symptoms:  Blood and/or small blood clots in the urine  Bladder spasms (frequency and urgency of urination)  Discomfort or aching in the back or side where the stent is  Burning or discomfort at the end of urine stream    To decrease these symptoms you should:  Take  "antispasmodic medication as prescribed (Detrol, Ditropan, etc.)  Drink plenty of fluids but avoid caffeine and citrus (include cranberry)  If you are having discomfort in back or side, decrease activity    Please call your physician or the physician on call if you experience:  Fever greater than 101 degrees  Severe pain not relieved by pain medication or rest    Please make an appointment for the removal of the stent according to your physician's instructions.        You received one oxyCODONE IR (ROXICODONE) 5 MG tablet at 12:05 pm.        Pending Results     Date and Time Order Name Status Description    2018 1109 Surgical pathology exam In process     2018 0941 Stone analysis In process             Admission Information     Date & Time Provider Department Dept. Phone    2018 Romeo Cheng MD [facility] PreOP/PostOP [phone]      Your Vitals Were     Blood Pressure Pulse Temperature Respirations Height Weight    123/78 (BP Location: Right arm) 68 96.3  F (35.7  C) (Temporal) 12 1.676 m (5' 6\") 86.2 kg (190 lb)    Pulse Oximetry BMI (Body Mass Index)                96% 30.67 kg/m2          MyChart Information     [employer] lets you send messages to your doctor, view your test results, renew your prescriptions, schedule appointments and more. To sign up, go to [email or web address] . Click on \"Log in\" on the left side of the screen, which will take you to the Welcome page. Then click on \"Sign up Now\" on the right side of the page.     You will be asked to enter the access code listed below, as well as some personal information. Please follow the directions to create your username and password.     Your access code is: [email or web address]  Expires: 2018  3:34 PM     Your access code will  in 90 days. If you need help or a new code, please call your [place] clinic or [phone].        Care EveryWhere ID     This is your Care EveryWhere ID. This could be used by other " organizations to access your [place] medical records  [id number]        Equal Access to Services     KANAKLEBER SUMAN : Hadii lary Gould, waalesia [place], qazakomid méndezmaigor sutherland. So [facility] [phone].    ATENCIÓN: Si habla español, tiene a ch disposición servicios gratuitos de asistencia lingüística. Llame al [phone].    We comply with applicable federal civil rights laws and [place] laws. We do not discriminate on the basis of race, color, national origin, age, disability, sex, sexual orientation, or gender identity.               Review of your medicines      START taking        Dose / Directions    oxyCODONE IR 5 MG tablet   Commonly known as:  ROXICODONE   Used for:  Calculus of left kidney        Dose:  5-10 mg   Take 1-2 tablets (5-10 mg) by mouth every 3 hours as needed for other (Moderate to Severe Pain)   Quantity:  15 tablet   Refills:  0       senna-docusate 8.6-50 MG per tablet   Commonly known as:  SENOKOT-S;PERICOLACE   Used for:  Calculus of left kidney        Dose:  1-2 tablet   Take 1-2 tablets by mouth 2 times daily as needed for constipation To prevent constipation while taking narcotic pain medication. Start with 1 tablet twice daily. If no bowel movement in 24 hours, increase to 2 tablets twice daily.  Discontinue if you have loose stools or when you are no longer taking narcotics.   Quantity:  20 tablet   Refills:  0       sulfamethoxazole-trimethoprim 800-160 MG per tablet   Commonly known as:  BACTRIM DS/SEPTRA DS   Used for:  Calculus of left kidney        Dose:  1 tablet   Take 1 tablet by mouth 2 times daily for 3 days   Quantity:  6 tablet   Refills:  0         CONTINUE these medicines which have NOT CHANGED        Dose / Directions    ALLEGRA 180 MG tablet   Generic drug:  fexofenadine        Dose:  180 mg   Take 180 mg by mouth Allergy season   Refills:  0       ATENOLOL PO        Dose:  25 mg   Take 25 mg by mouth daily    Refills:  0       ATORVASTATIN CALCIUM PO        Dose:  10 mg   Take 10 mg by mouth 4x week   Refills:  0       CIALIS 20 MG tablet   Generic drug:  tadalafil        Dose:  20 mg   Take 20 mg by mouth as needed for erectile dysfunction   Refills:  0       doxycycline Monohydrate 100 MG Tabs        Dose:  100 mg   Take 100 mg by mouth   Refills:  0       glimepiride 2 MG tablet   Commonly known as:  AMARYL        Dose:  2 mg   Take 2 mg by mouth every morning (before breakfast)   Refills:  0       glucosamine chondroitin 1500 complex Caps        Take by mouth 2 times daily   Refills:  0       LISINOPRIL PO        Dose:  2.5 mg   Take 2.5 mg by mouth daily   Refills:  0       metFORMIN 750 MG 24 hr tablet   Commonly known as:  GLUCOPHAGE-XR        Dose:  750 mg   Take 750 mg by mouth 2 times daily   Refills:  0       MINOCYCLINE HCL PO        Dose:  100 mg   Take 100 mg by mouth daily   Refills:  0       ONETOUCH VERIO IQ test strip   Generic drug:  blood glucose monitoring        3 times daily.   Refills:  0       PRILOSEC PO        Dose:  20 mg   Take 20 mg by mouth every morning   Refills:  0       SUDAFED PO        Take by mouth as needed for congestion   Refills:  0       SYNTHROID PO        Dose:  112 mcg   Take 112 mcg by mouth daily   Refills:  0       tamsulosin 0.4 MG capsule   Commonly known as:  FLOMAX   Used for:  Benign prostatic hyperplasia with weak urinary stream        Dose:  0.4 mg   Take 1 capsule (0.4 mg) by mouth daily 30 minutes after evening meal Do not take within 4 hours of Cialis   Quantity:  30 capsule   Refills:  11       TEARS NATURALE OP        As directed   Refills:  0       TESTOSTERONE AQUEOUS IM        Dose:  250 mg   Inject 250 mg into the muscle Every Three Weeks   Refills:  0       vitamin B complex with vitamin C Tabs tablet        Dose:  1 tablet   Take 1 tablet by mouth daily   Refills:  0       WAL-ACT 2.5-60 MG Tabs per tablet   Generic drug:  triprolidine-pseudoePHEDrine         1 po qhs for allergy   Refills:  0       ZANTAC PO        OTC- occasional after certain foods   Refills:  0            Where to get your medicines      These medications were sent to [place], [place] - [address] [facility]  [address] [facility] [address]     Phone:  [phone]     sulfamethoxazole-trimethoprim 800-160 MG per tablet         Some of these will need a paper prescription and others can be bought over the counter. Ask your nurse if you have questions.     Bring a paper prescription for each of these medications     oxyCODONE IR 5 MG tablet    senna-docusate 8.6-50 MG per tablet                Protect others around you: Learn how to safely use, store and throw away your medicines at www.disposemymeds.org.        ANTIBIOTIC INSTRUCTION     You've Been Prescribed an Antibiotic - Now What?  Your healthcare team thinks that you or your loved one might have an infection. Some infections can be treated with antibiotics, which are powerful, life-saving drugs. Like all medications, antibiotics have side effects and should only be used when necessary. There are some important things you should know about your antibiotic treatment.      Your healthcare team may run tests before you start taking an antibiotic.    Your team may take samples (e.g., from your blood, urine or other areas) to run tests to look for bacteria. These test can be important to determine if you need an antibiotic at all and, if you do, which antibiotic will work best.      Within a few days, your healthcare team might change or even stop your antibiotic.    Your team may start you on an antibiotic while they are working to find out what is making you sick.    Your team might change your antibiotic because test results show that a different antibiotic would be better to treat your infection.    In some cases, once your team has more information, they learn that you do not need an antibiotic at  all. They may find out that you don't have an infection, or that the antibiotic you're taking won't work against your infection. For example, an infection caused by a virus can't be treated with antibiotics. Staying on an antibiotic when you don't need it is more likely to be harmful than helpful.      You may experience side effects from your antibiotic.    Like all medications, antibiotics have side effects. Some of these can be serious.    Let you healthcare team know if you have any known allergies when you are admitted to the hospital.    One significant side effect of nearly all antibiotics is the risk of severe and sometimes deadly diarrhea caused by Clostridium difficile (C. Difficile). This occurs when a person takes antibiotics because some good germs are destroyed. Antibiotic use allows C. diificile to take over, putting patients at high risk for this serious infection.    As a patient or caregiver, it is important to understand your or your loved one's antibiotic treatment. It is especially important for caregivers to speak up when patients can't speak for themselves. Here are some important questions to ask your healthcare team.    What infection is this antibiotic treating and how do you know I have that infection?    What side effects might occur from this antibiotic?    How long will I need to take this antibiotic?    Is it safe to take this antibiotic with other medications or supplements (e.g., vitamins) that I am taking?     Are there any special directions I need to know about taking this antibiotic? For example, should I take it with food?    How will I be monitored to know whether my infection is responding to the antibiotic?    What tests may help to make sure the right antibiotic is prescribed for me?      Information provided by:  www.cdc.gov/getsmart  U.S. Department of Health and Human Services  Centers for disease Control and Prevention  National Center for Emerging and Zoonotic  Infectious Diseases  Division of Healthcare Quality Promotion        Information about OPIOIDS     PRESCRIPTION OPIOIDS: WHAT YOU NEED TO KNOW   You have a prescription for an opioid (narcotic) pain medicine. Opioids can cause addiction. If you have a history of chemical dependency of any type, you are at a higher risk of becoming addicted to opioids. Only take this medicine after all other options have been tried. Take it for as short a time and as few doses as possible.     Do not:    Drive. If you drive while taking these medicines, you could be arrested for driving under the influence (DUI).    Operate heavy machinery    Do any other dangerous activities while taking these medicines.     Drink any alcohol while taking these medicines.      Take with any other medicines that contain acetaminophen. Read all labels carefully. Look for the word  acetaminophen  or  Tylenol.  Ask your pharmacist if you have questions or are unsure.    Store your pills in a secure place, locked if possible. We will not replace any lost or stolen medicine. If you don t finish your medicine, please throw away (dispose) as directed by your pharmacist. The [place] Pollution Control Agency has more information about safe disposal: https://www.pca.[facility].[place].us/living-green/managing-unwanted-medications    All opioids tend to cause constipation. Drink plenty of water and eat foods that have a lot of fiber, such as fruits, vegetables, prune juice, apple juice and high-fiber cereal. Take a laxative (Miralax, milk of magnesia, Colace, Senna) if you don t move your bowels at least every other day.              Medication List: This is a list of all your medications and when to take them. Check marks below indicate your daily home schedule. Keep this list as a reference.      Medications           Morning Afternoon Evening Bedtime As Needed    ALLEGRA 180 MG tablet   Take 180 mg by mouth Allergy season   Generic drug:  fexofenadine                                 ATENOLOL PO   Take 25 mg by mouth daily                                ATORVASTATIN CALCIUM PO   Take 10 mg by mouth 4x week                                CIALIS 20 MG tablet   Take 20 mg by mouth as needed for erectile dysfunction   Generic drug:  tadalafil                                doxycycline Monohydrate 100 MG Tabs   Take 100 mg by mouth                                glimepiride 2 MG tablet   Commonly known as:  AMARYL   Take 2 mg by mouth every morning (before breakfast)                                glucosamine chondroitin 1500 complex Caps   Take by mouth 2 times daily                                LISINOPRIL PO   Take 2.5 mg by mouth daily                                metFORMIN 750 MG 24 hr tablet   Commonly known as:  GLUCOPHAGE-XR   Take 750 mg by mouth 2 times daily                                MINOCYCLINE HCL PO   Take 100 mg by mouth daily                                ONETOUCH VERIO IQ test strip   3 times daily.   Generic drug:  blood glucose monitoring                                oxyCODONE IR 5 MG tablet   Commonly known as:  ROXICODONE   Take 1-2 tablets (5-10 mg) by mouth every 3 hours as needed for other (Moderate to Severe Pain)   Last time this was given:  5 mg on 6/5/2018 12:02 PM                                PRILOSEC PO   Take 20 mg by mouth every morning                                senna-docusate 8.6-50 MG per tablet   Commonly known as:  SENOKOT-S;PERICOLACE   Take 1-2 tablets by mouth 2 times daily as needed for constipation To prevent constipation while taking narcotic pain medication. Start with 1 tablet twice daily. If no bowel movement in 24 hours, increase to 2 tablets twice daily.  Discontinue if you have loose stools or when you are no longer taking narcotics.                                SUDAFED PO   Take by mouth as needed for congestion                                sulfamethoxazole-trimethoprim 800-160 MG per tablet   Commonly known as:   BACTRIM DS/SEPTRA DS   Take 1 tablet by mouth 2 times daily for 3 days                                SYNTHROID PO   Take 112 mcg by mouth daily                                tamsulosin 0.4 MG capsule   Commonly known as:  FLOMAX   Take 1 capsule (0.4 mg) by mouth daily 30 minutes after evening meal Do not take within 4 hours of Cialis                                TEARS NATURALE OP   As directed                                TESTOSTERONE AQUEOUS IM   Inject 250 mg into the muscle Every Three Weeks                                vitamin B complex with vitamin C Tabs tablet   Take 1 tablet by mouth daily                                WAL-ACT 2.5-60 MG Tabs per tablet   1 po qhs for allergy   Generic drug:  triprolidine-pseudoePHEDrine                                ZANTAC PO   OTC- occasional after certain foods

## 2018-06-05 NOTE — DISCHARGE INSTRUCTIONS
DR. THOMAS HELTON M.D.         CLINIC PHONE NUMBER:  [phone]    GENERAL ANESTHESIA OR SEDATION ADULT DISCHARGE INSTRUCTIONS   SPECIAL PRECAUTIONS FOR 24 HOURS AFTER SURGERY    IT IS NOT UNUSUAL TO FEEL LIGHT-HEADED OR FAINT, UP TO 24 HOURS AFTER SURGERY OR WHILE TAKING PAIN MEDICATION.  IF YOU HAVE THESE SYMPTOMS; SIT FOR A FEW MINUTES BEFORE STANDING AND HAVE SOMEONE ASSIST YOU WHEN YOU GET UP TO WALK OR USE THE BATHROOM.    YOU SHOULD REST AND RELAX FOR THE NEXT 24 HOURS AND YOU MUST MAKE ARRANGEMENTS TO HAVE SOMEONE STAY WITH YOU FOR AT LEAST 24 HOURS AFTER YOUR DISCHARGE.  AVOID HAZARDOUS AND STRENUOUS ACTIVITIES.  DO NOT MAKE IMPORTANT DECISIONS FOR 24 HOURS.    DO NOT DRIVE ANY VEHICLE OR OPERATE MECHANICAL EQUIPMENT FOR 24 HOURS FOLLOWING THE END OF YOUR SURGERY.  EVEN THOUGH YOU MAY FEEL NORMAL, YOUR REACTIONS MAY BE AFFECTED BY THE MEDICATION YOU HAVE RECEIVED.    DO NOT DRINK ALCOHOLIC BEVERAGES FOR 24 HOURS FOLLOWING YOUR SURGERY.    DRINK CLEAR LIQUIDS (APPLE JUICE, GINGER ALE, 7-UP, BROTH, ETC.).  PROGRESS TO YOUR REGULAR DIET AS YOU FEEL ABLE.    YOU MAY HAVE A DRY MOUTH, A SORE THROAT, MUSCLES ACHES OR TROUBLE SLEEPING.  THESE SHOULD GO AWAY AFTER 24 HOURS.    CALL YOUR DOCTOR FOR ANY OF THE FOLLOWING:  SIGNS OF INFECTION (FEVER, GROWING TENDERNESS AT THE SURGERY SITE, A LARGE AMOUNT OF DRAINAGE OR BLEEDING, SEVERE PAIN, FOUL-SMELLING DRAINAGE, REDNESS OR SWELLING.    IT HAS BEEN OVER 8 TO 10 HOURS SINCE SURGERY AND YOU ARE STILL NOT ABLE TO URINATE (PASS WATER).     CYSTOSCOPY DISCHARGE INSTRUCTIONS  [facility] / UROLOGY  [facility] [address]  [phone]    YOU MAY GO BACK TO YOUR NORMAL DIET AND ACTIVITY, UNLESS YOUR DOCTOR TELLS YOU NOT TO.    FOR THE NEXT TWO DAYS, YOU MAY NOTICE:    SOME BLOOD IN YOUR URINE.  SOME BURNING WHEN YOU URINATE (USE THE TOILET).  AN URGE TO URINATE MORE OFTEN.  BLADDER SPASMS.    THESE ARE NORMAL AFTER THE PROCEDURE.  THEY SHOULD GO AWAY  AFTER A DAY OR TWO.  TO RELIEVE THESE PROBLEMS:     DRINK 6 TO 8 LARGE GLASSES OF WATER EACH DAY (INCLUDES DRINKS AT MEALS).  THIS WILL HELP CLEAR THE URINE.    TAKE WARM BATHS TO RELIEVE PAIN AND BLADDER SPASMS.  DO NOT ADD ANYTHING TO THE BATH WATER.    YOUR DOCTOR MAY PRESCRIBE PAIN MEDICINE.  YOU MAY ALSO TAKE TYLENOL (ACETAMINOPHEN) FOR PAIN.    CALL YOUR SURGEON IF YOU HAVE:    A FEVER OVER 101 DEGREES.  CHECK YOUR TEMPERATURE UNDER YOUR TONGUE.    CHILLS.    FAILURE TO URINATE (NO URINE COMES OUT WHEN YOU TRY TO USE THE TOILET).  TRY SOAKING IN A BATHTUB FULL OF WARM WATER.  IF STILL NO URINE, CALL YOUR DOCTOR.    A LOT OF BLOOD IN THE URINE, OR BLOOD CLOTS LARGER THAN A NICKEL.      PAIN IN THE BACK OR BELLY AREA (ABDOMEN).    PAIN OR SPASMS THAT ARE NOT RELIEVED BY WARM TUB BATHS AND PAIN MEDICINE.      SEVERE PAIN, BURNING OR OTHER PROBLEMS WHILE PASSING URINE.    PAIN THAT GETS WORSE AFTER TWO DAYS.        STENT INFORMATION/DISCHARGE INSTRUCTIONS   o [facility] / UROLOGY  [facility]  [phone]    During surgery, a stent may be placed in the ureter.  The ureter is the tube that drains urine from the kidney to the bladder.  The stent is placed to dilate (open) the ureter so stone fragments can pass easily through the ureter or to decrease ureteral swelling after surgery or to relieve an obstruction.      The stent is made of silicone.  The upper end of the stent curls in the kidney while the lower end rests in the bladder.    While the stent is in place you may experience the following symptoms:  Blood and/or small blood clots in the urine  Bladder spasms (frequency and urgency of urination)  Discomfort or aching in the back or side where the stent is  Burning or discomfort at the end of urine stream    To decrease these symptoms you should:  Take antispasmodic medication as prescribed (Detrol, Ditropan, etc.)  Drink plenty of fluids but avoid caffeine and citrus (include  cranberry)  If you are having discomfort in back or side, decrease activity    Please call your physician or the physician on call if you experience:  Fever greater than 101 degrees  Severe pain not relieved by pain medication or rest    Please make an appointment for the removal of the stent according to your physician's instructions.        You received one oxyCODONE IR (ROXICODONE) 5 MG tablet at 12:05 pm.

## 2018-06-05 NOTE — OP NOTE
DATE OF SERVICE: 6/5/2018  PREOPERATIVE DIAGNOSIS: Left nephrolithiasis  POSTOPERATIVE DIAGNOSIS: Left nephrolithiasis    PROCEDURES PERFORMED:   1. Cystourethroscopy  2. Left flexible ureteroscopy with holmium laser lithotripsy and stone extraction  3. Left retrograde pyelography with interpretation of intraoperative fluoroscopic imaging  4. Left ureteral stent placement    STAFF SURGEON: Romeo Cheng MD  ANESTHESIA: General    ESTIMATED BLOOD LOSS: 1 cc  DRAINS/TUBES: 6 Venezuelan x 26 cm double-J ureteral stent     COMPLICATIONS: None.   SPECIMEN: Stone for analysis  SIGNIFICANT FINDINGS: Stone free on left at end of case.    BRIEF OPERATIVE INDICATIONS: Geoffrey Benjamin is a 72 year old male who recently presented with left renal calculi. Had recent treatment of ureteral stones and has several additional stones in kidney. After a discussion of all risks, benefits, and alternatives, the patient elected to proceed with definitive stone management. The patient understands the potential need for more than one procedure to eliminate all stone burden.      DESCRIPTION OF PROCEDURE:  After informed consent was verified, the patient was transported to the operating room, placed supine on the table. After adequate anesthesia was induced, he was placed in dorsal lithotomy and prepped and draped in the usual sterile fashion. A timeout was taken to confirm correct patient, procedure and laterality. Pre-operative IV antibiotics were administered.    A 22 Venezuelan rigid cystoscope was inserted per a well-lubricated urethra. The anterior urethra was unremarkable. The ureteral orifices were orthotopic bilaterally. The left ureteral orifice was identified and cannulated with a Sensor wire and 6-Fr open-ended catheter. The wire passed without resistance into the upper pole. A dual lumen catheter was used to establish access with a second, Super Stiff wire. A retrograde pyelogram showed no evidence of ureteral stones, hydronephrosis, or  other filling defects. Ureteral access sheath was advanced into the left proximal ureter. A flexible ureteroscope was advanced under direct vision into the renal pelvis. 3 stones were noted in the kidney, all were 6-8 mm in diameter. A 200 micron laser fiber at settings of 0.6 J and 6 Hz was used to fragment the stones. Stones were extremely hard and difficult to fragment. There was an area of relative narrowing in the proximal ureter All stones >1mm were basketed and withdrawn.  Pullback ureteroscopy was performed and showed no retained stone fragments. There was a small area of edematous mucosa in proximal ureter and small mucosal defect noted. This was anterior and minor in appearing. Retrograde pyelogram demonstrated small amount of extravasation of contrast from this area. A 6 Fr x 26 cm double-J stent was advanced over the Sensor wire, and a good proximal curl was seen in the renal pelvis and the distal curl was seen in the bladder. The bladder was drained.   The patient tolerated the procedure well.  No apparent complications. He was transported to the postanesthesia care unit in stable condition.      PLAN: Return in 2-3 weeks for cystoscopy and ureteral stent removal in the office. Would recommend renal ultrasound 4-6 weeks after ureteral stent removal.    Romeo Cheng MD  Urology  [facility]  Clinic Phone [phone]

## 2018-06-05 NOTE — ANESTHESIA PREPROCEDURE EVALUATION
Anesthesia Evaluation     . Pt has had prior anesthetic. Type: General    No history of anesthetic complications          ROS/MED HX    ENT/Pulmonary:  - neg pulmonary ROS     Neurologic:  - neg neurologic ROS     Cardiovascular:     (+) hypertension--CAD, --. : . . . :. .       METS/Exercise Tolerance:     Hematologic:  - neg hematologic  ROS       Musculoskeletal:  - neg musculoskeletal ROS       GI/Hepatic:     (+) GERD Asymptomatic on medication,       Renal/Genitourinary:     (+) Nephrolithiasis ,       Endo:     (+) type II DM thyroid problem hypothyroidism, .      Psychiatric:     (+) psychiatric history depression      Infectious Disease:  - neg infectious disease ROS       Malignancy:      - no malignancy   Other:    - neg other ROS                 Physical Exam  Normal systems: cardiovascular, pulmonary and dental    Airway   Mallampati: I  TM distance: >3 FB  Neck ROM: full    Dental     Cardiovascular       Pulmonary                     Anesthesia Plan      History & Physical Review  History and physical reviewed and following examination; no interval change.    ASA Status:  2 .        Plan for General and ETT with Intravenous and Propofol induction. Maintenance will be Balanced.    PONV prophylaxis:  Ondansetron (or other 5HT-3)       Postoperative Care  Postoperative pain management:  IV analgesics and Oral pain medications.      Consents  Anesthetic plan, risks, benefits and alternatives discussed with:  Patient or representative and Patient..                          .

## 2018-06-05 NOTE — IP AVS SNAPSHOT
[facility] PreOP/PostOP    [address]    [facility] [address]    Phone:  [phone]    Fax:  [phone]                                       After Visit Summary   6/5/2018    Geoffrey Benjamin    MRN: [id number]           After Visit Summary Signature Page     I have received my discharge instructions, and my questions have been answered. I have discussed any challenges I see with this plan with the nurse or doctor.    ..........................................................................................................................................  Patient/Patient Representative Signature      ..........................................................................................................................................  Patient Representative Print Name and Relationship to Patient    ..................................................               ................................................  Date                                            Time    ..........................................................................................................................................  Reviewed by Signature/Title    ...................................................              ..............................................  Date                                                            Time

## 2018-06-06 NOTE — ANESTHESIA POSTPROCEDURE EVALUATION
Patient: Geoffrey Benjamin    Procedure(s):  Video Cystoscopy Left flexible ureteroscopy with Holmium Laser, retrogrades, stone extraction, stent placement - Wound Class: II-Clean Contaminated    Diagnosis:Stones   Diagnosis Additional Information: DATE OF SERVICE: 6/5/2018  PREOPERATIVE DIAGNOSIS: Left nephrolithiasis  POSTOPERATIVE DIAGNOSIS: Left nephrolithiasis    PROCEDURES PERFORMED:   1. Cystourethroscopy  2. Left flexible ureteroscopy with holmium laser lithotripsy and stone extraction, 3. Left retrograde pyelography with interpretation of intraoperative fluoroscopic imaging  4. Left ureteral stent placement    Anesthesia Type:  General, ETT    Note:  Anesthesia Post Evaluation    Patient location during evaluation: Phase 2  Patient participation: Able to fully participate in evaluation  Level of consciousness: awake  Pain management: adequate  Airway patency: patent  Cardiovascular status: acceptable  Respiratory status: acceptable  Hydration status: euvolemic  PONV: controlled     Anesthetic complications: None          Last vitals:  Vitals:    06/05/18 1202 06/05/18 1222 06/05/18 1250   BP:  123/78 122/72   Pulse:      Resp: 14 12 14   Temp:  96.3  F (35.7  C) 98  F (36.7  C)   SpO2: 100% 96% 98%         Electronically Signed By: Chuy Winchester MD  June 5, 2018  7:15 PM

## 2018-06-09 LAB
APPEARANCE STONE: NORMAL
COMPN STONE: NORMAL
NUMBER STONE: NORMAL
SIZE STONE: NORMAL MM
WT STONE: 179 MG

## 2018-06-22 ENCOUNTER — OFFICE VISIT (OUTPATIENT)
Dept: UROLOGY | Facility: CLINIC | Age: 73
End: 2018-06-22
Payer: COMMERCIAL

## 2018-06-22 VITALS
HEIGHT: 66 IN | DIASTOLIC BLOOD PRESSURE: 80 MMHG | SYSTOLIC BLOOD PRESSURE: 130 MMHG | WEIGHT: 186 LBS | HEART RATE: 90 BPM | BODY MASS INDEX: 29.89 KG/M2

## 2018-06-22 DIAGNOSIS — N20.0 CALCULUS OF LEFT KIDNEY: Primary | ICD-10-CM

## 2018-06-22 PROCEDURE — 52310 CYSTOSCOPY AND TREATMENT: CPT | Performed by: UROLOGY

## 2018-06-22 RX ORDER — GLIMEPIRIDE 4 MG/1
4 TABLET ORAL DAILY
Refills: 2 | COMMUNITY
Start: 2018-04-18 | End: 2018-11-20 | Stop reason: DRUGHIGH

## 2018-06-22 RX ORDER — DOXYCYCLINE 100 MG/1
CAPSULE ORAL
Refills: 4 | COMMUNITY
Start: 2018-06-04 | End: 2018-11-20

## 2018-06-22 ASSESSMENT — PAIN SCALES - GENERAL: PAINLEVEL: NO PAIN (0)

## 2018-06-22 NOTE — LETTER
6/22/2018      RE: Geoffrey Benjamin  [address] [facility] [address]       Geoffrey Benjamin is a 72-year-old male with BPH, now on Flomax and voiding well. He also was discovered to have left ureteral and left renal stones that have now been treated. He returns for cystoscopy and stent removal.  Other past  history significant for a family history of prostate cancer and hypogonadism. The patient takes testosterone on a regular basis. His PSAs and digital rectal exams have been normal in the past.  Medications and allergies reviewed  Flexible cystoscopy-normal urethra, small lateral lobes of prostate, left double-J stent removed easily  Assessment: Family history of prostate cancer, on testosterone supplements, urolithiasis, BPH  Plan: Cipro 500 mg ×1 today. See me in 6 months with KUB. 24-hour urine. Has appointment with nephrology later this year. PSA and digital rectal exam yearly    Alfonso Sinha MD

## 2018-06-22 NOTE — PROGRESS NOTES
Geoffrey Benjamin is a 72-year-old male with BPH, now on Flomax and voiding well. He also was discovered to have left ureteral and left renal stones that have now been treated. He returns for cystoscopy and stent removal.  Other past  history significant for a family history of prostate cancer and hypogonadism. The patient takes testosterone on a regular basis. His PSAs and digital rectal exams have been normal in the past.  Medications and allergies reviewed  Flexible cystoscopy-normal urethra, small lateral lobes of prostate, left double-J stent removed easily  Assessment: Family history of prostate cancer, on testosterone supplements, urolithiasis, BPH  Plan: Cipro 500 mg ×1 today. See me in 6 months with KUB. 24-hour urine. Has appointment with nephrology later this year. PSA and digital rectal exam yearly

## 2018-06-22 NOTE — NURSING NOTE
Invasive Procedure Safety Checklist:    Procedure:     Action: Complete sections and checkboxes as appropriate.    Pre-procedure:  1. Patient ID Verified with 2 identifiers (Nina and  or MRN) : YES    2. Procedure and site verified with patient/designee (when able) : YES    3. Accurate consent documentation in medical record : YES    4. H&P (or appropriate assessment) documented in medical record : YES  H&P must be up to 30 days prior to procedure an updated within 24 hours of                 Procedure as applicable.     5. Relevant diagnostic and radiology test results appropriately labeled and displayed as applicable : YES    6. Blood products, implants, devices, and/or special equipment available for the procedure as applicable : YES    7. Procedure site(s) marked with provider initials [Exclusions: N/A] : NO    8. Marking not required. Reason : Yes  Procedure does not require site marking    Time Out:     Time-Out performed immediately prior to starting procedure, including verbal and active participation of all team members addressing: YES    1. Correct patient identity.  2. Confirmed that the correct side and site are marked.  3. An accurate procedure to be done.  4. Agreement on the procedure to be done.  5. Correct patient position.  6. Relevant images and results are properly labeled and appropriately displayed.  7. The need to administer antibiotics or fluids for irrigation purposes during the procedure as applicable.  8. Safety precautions based on patient history or medication use.    During Procedure: Verification of correct person, site, and procedure occurs any time the responsibility for care of the patient is transferred to another member of the care team.    The following medication was given:     MEDICATION: Uro-jet  ROUTE: Urethral meatus   SITE: urethra   DOSE: 5 mL  LOT #: [id number]  :  International medication systems, limited  EXPIRATION DATE:    NDC#: [address]-3

## 2018-06-22 NOTE — PATIENT INSTRUCTIONS
"     AFTER YOUR CYSTOSCOPY         You have just completed a cystoscopy, or \"cysto\", which allowed your physician to learn more about your bladder (or to remove a stent placed after surgery). We suggest that you continue to avoid caffeine, fruit juice, and alcohol for the next 24 hours, however, you are encouraged to return to your normal activities.       A few things that are considered normal after your cystoscopy:    * small amount of bleeding (or spotting) that clears within the next 24 hours    * slight burning sensation with urination    * sensation to of needing to avoid more frequently    * the feeling of \"air\" in your urine    * mild discomfort that is relieved with Tylonol        Please contact our office promptly if you:    * develop a fever above 101 degrees    * are unable to urinate    * develop bright red blood that does not stop    * severe pain or swelling        And of course, please contact our office with any concerns or questions [phone]        "

## 2018-06-22 NOTE — MR AVS SNAPSHOT
"              After Visit Summary   6/22/2018    Geoffrey Benjamin    MRN: [id number]           Patient Information     Date Of Birth          1945        Visit Information        Provider Department      6/22/2018 8:00 AM Alfonso Sinha MD; [facility] [place]        Today's Diagnoses     Calculus of left kidney    -  1      Care Instructions         AFTER YOUR CYSTOSCOPY         You have just completed a cystoscopy, or \"cysto\", which allowed your physician to learn more about your bladder (or to remove a stent placed after surgery). We suggest that you continue to avoid caffeine, fruit juice, and alcohol for the next 24 hours, however, you are encouraged to return to your normal activities.       A few things that are considered normal after your cystoscopy:    * small amount of bleeding (or spotting) that clears within the next 24 hours    * slight burning sensation with urination    * sensation to of needing to avoid more frequently    * the feeling of \"air\" in your urine    * mild discomfort that is relieved with Tylonol        Please contact our office promptly if you:    * develop a fever above 101 degrees    * are unable to urinate    * develop bright red blood that does not stop    * severe pain or swelling        And of course, please contact our office with any concerns or questions [phone]                Follow-ups after your visit        Follow-up notes from your care team     Return in about 6 months (around 12/22/2018) for KUB.      Your next 10 appointments already scheduled     Dec 21, 2018 10:00 AM CST   XR KUB with SHXR3   [facility] Radiology ([facility])    [address] [place] [address] [phone]           Please bring a list of your current medicines to your exam. (Include vitamins, minerals and over-thecounter medicines.) Leave your valuables at home.  Tell your doctor if there is a chance you may " "be pregnant.  You do not need to do anything special for this exam.            Dec 21, 2018 11:00 AM CST   Return Visit with Alfonso Sinha MD   [facility] [place] ([facility])    [address]  [address]  [facility] [address]   [phone]              Future tests that were ordered for you today     Open Future Orders        Priority Expected Expires Ordered    XR KUB [[id number]] Routine 2018            Who to contact     If you have questions or need follow up information about today's clinic visit or your schedule please contact [facility] [place] directly at [phone].  Normal or non-critical lab and imaging results will be communicated to you by Data Elitehart, letter or phone within 4 business days after the clinic has received the results. If you do not hear from us within 7 days, please contact the clinic through Data Elitehart or phone. If you have a critical or abnormal lab result, we will notify you by phone as soon as possible.  Submit refill requests through TheraVid or call your pharmacy and they will forward the refill request to us. Please allow 3 business days for your refill to be completed.          Additional Information About Your Visit        TheraVid Information     TheraVid lets you send messages to your doctor, view your test results, renew your prescriptions, schedule appointments and more. To sign up, go to [email or web address] . Click on \"Log in\" on the left side of the screen, which will take you to the Welcome page. Then click on \"Sign up Now\" on the right side of the page.     You will be asked to enter the access code listed below, as well as some personal information. Please follow the directions to create your username and password.     Your access code is: [email or web address]  Expires: 2018  3:34 PM     Your access code will  in 90 days. If you need help or a new code, please " "call your [facility] or [phone].        Care EveryWhere ID     This is your Care EveryWhere ID. This could be used by other organizations to access your [facility] medical records  [id number]        Your Vitals Were     Pulse Height BMI (Body Mass Index)             90 1.676 m (5' 6\") 30.02 kg/m2          Blood Pressure from Last 3 Encounters:   06/22/18 130/80   06/05/18 122/72   05/08/18 121/79    Weight from Last 3 Encounters:   06/22/18 84.4 kg (186 lb)   06/05/18 86.2 kg (190 lb)   05/15/18 88 kg (194 lb)               Primary Care Provider Office Phone # Fax #    Kike Ng [phone]       [facility] [address]  [facility] [address]        Equal Access to Services     [facility]: Hadii lary singletary hadasho Sojing, waaxda luqadaha, qaybta kaalmada adeegyada, igor hall . So [facility] [phone].    ATENCIÓN: Si habla español, tiene a ch disposición servicios gratuitos de asistencia lingüística. Ernestine al [phone].    We comply with applicable federal civil rights laws and [place] laws. We do not discriminate on the basis of race, color, national origin, age, disability, sex, sexual orientation, or gender identity.            Thank you!     Thank you for choosing [facility] [place]  for your care. Our goal is always to provide you with excellent care. Hearing back from our patients is one way we can continue to improve our services. Please take a few minutes to complete the written survey that you may receive in the mail after your visit with us. Thank you!             Your Updated Medication List - Protect others around you: Learn how to safely use, store and throw away your medicines at www.disposemymeds.org.          This list is accurate as of 6/22/18  8:33 AM.  Always use your most recent med list.                   Brand Name Dispense Instructions for use Diagnosis    ALLEGRA 180 MG tablet   Generic " drug:  fexofenadine      Take 180 mg by mouth Allergy season        ATENOLOL PO      Take 25 mg by mouth daily        ATORVASTATIN CALCIUM PO      Take 10 mg by mouth 4x week        CIALIS 20 MG tablet   Generic drug:  tadalafil      Take 20 mg by mouth as needed for erectile dysfunction        * doxycycline Monohydrate 100 MG Tabs      Take 100 mg by mouth        * doxycycline monohydrate 100 MG capsule      TAKE 1 CAPSULE BY MOUTH TWICE A DAY        * glimepiride 2 MG tablet    AMARYL     Take 2 mg by mouth every morning (before breakfast)        * glimepiride 4 MG tablet    AMARYL     Take 4 mg by mouth daily        glucosamine chondroitin 1500 complex Caps      Take by mouth 2 times daily        LISINOPRIL PO      Take 2.5 mg by mouth daily        metFORMIN 750 MG 24 hr tablet    GLUCOPHAGE-XR     Take 750 mg by mouth 2 times daily        MINOCYCLINE HCL PO      Take 100 mg by mouth daily        ONETOUCH VERIO IQ test strip   Generic drug:  blood glucose monitoring      3 times daily.        oxyCODONE IR 5 MG tablet    ROXICODONE    15 tablet    Take 1-2 tablets (5-10 mg) by mouth every 3 hours as needed for other (Moderate to Severe Pain)    Calculus of left kidney       PRILOSEC PO      Take 20 mg by mouth every morning        senna-docusate 8.6-50 MG per tablet    SENOKOT-S;PERICOLACE    20 tablet    Take 1-2 tablets by mouth 2 times daily as needed for constipation To prevent constipation while taking narcotic pain medication. Start with 1 tablet twice daily. If no bowel movement in 24 hours, increase to 2 tablets twice daily.  Discontinue if you have loose stools or when you are no longer taking narcotics.    Calculus of left kidney       SUDAFED PO      Take by mouth as needed for congestion        SYNTHROID PO      Take 112 mcg by mouth daily        tamsulosin 0.4 MG capsule    FLOMAX    30 capsule    Take 1 capsule (0.4 mg) by mouth daily 30 minutes after evening meal Do not take within 4 hours of Cialis     Benign prostatic hyperplasia with weak urinary stream       TEARS NATURALE OP      As directed        TESTOSTERONE AQUEOUS IM      Inject 250 mg into the muscle Every Three Weeks        vitamin B complex with vitamin C Tabs tablet      Take 1 tablet by mouth daily        WAL-ACT 2.5-60 MG Tabs per tablet   Generic drug:  triprolidine-pseudoePHEDrine      1 po qhs for allergy        ZANTAC PO      OTC- occasional after certain foods        * Notice:  This list has 4 medication(s) that are the same as other medications prescribed for you. Read the directions carefully, and ask your doctor or other care provider to review them with you.

## 2018-06-22 NOTE — LETTER
6/22/2018       RE: Geoffrey Benjamin  [address] [facility] [address]     Dear Colleague,    Thank you for referring your patient, Geoffrey Benjamin, to the [facility] [place] at [facility]. Please see a copy of my visit note below.    Geoffrey Benjamin is a 72-year-old male with BPH, now on Flomax and voiding well. He also was discovered to have left ureteral and left renal stones that have now been treated. He returns for cystoscopy and stent removal.  Other past  history significant for a family history of prostate cancer and hypogonadism. The patient takes testosterone on a regular basis. His PSAs and digital rectal exams have been normal in the past.  Medications and allergies reviewed  Flexible cystoscopy-normal urethra, small lateral lobes of prostate, left double-J stent removed easily  Assessment: Family history of prostate cancer, on testosterone supplements, urolithiasis, BPH  Plan: Cipro 500 mg ×1 today. See me in 6 months with KUB. 24-hour urine. Has appointment with nephrology later this year. PSA and digital rectal exam yearly    Again, thank you for allowing me to participate in the care of your patient.      Sincerely,    Alfonso Sinha MD

## 2018-11-20 ENCOUNTER — OFFICE VISIT (OUTPATIENT)
Dept: CARDIOLOGY | Facility: CLINIC | Age: 73
End: 2018-11-20
Payer: COMMERCIAL

## 2018-11-20 VITALS
HEART RATE: 80 BPM | DIASTOLIC BLOOD PRESSURE: 70 MMHG | WEIGHT: 190 LBS | HEIGHT: 66 IN | SYSTOLIC BLOOD PRESSURE: 112 MMHG | BODY MASS INDEX: 30.53 KG/M2

## 2018-11-20 DIAGNOSIS — Z95.1 POSTSURGICAL AORTOCORONARY BYPASS STATUS: ICD-10-CM

## 2018-11-20 DIAGNOSIS — E78.2 MIXED HYPERLIPIDEMIA: ICD-10-CM

## 2018-11-20 DIAGNOSIS — E88.810 METABOLIC SYNDROME: ICD-10-CM

## 2018-11-20 DIAGNOSIS — I25.10 CORONARY ARTERY DISEASE INVOLVING NATIVE CORONARY ARTERY OF NATIVE HEART WITHOUT ANGINA PECTORIS: Primary | ICD-10-CM

## 2018-11-20 PROCEDURE — 99214 OFFICE O/P EST MOD 30 MIN: CPT | Performed by: INTERNAL MEDICINE

## 2018-11-20 RX ORDER — UBIDECARENONE 75 MG
1 CAPSULE ORAL DAILY
COMMUNITY
End: 2020-10-19

## 2018-11-20 RX ORDER — ASPIRIN 325 MG
325 TABLET ORAL EVERY EVENING
Status: ON HOLD | COMMUNITY
End: 2020-10-21

## 2018-11-20 RX ORDER — HYDROCHLOROTHIAZIDE 25 MG/1
25 TABLET ORAL DAILY
COMMUNITY

## 2018-11-20 RX ORDER — MULTIPLE VITAMINS W/ MINERALS TAB 9MG-400MCG
1 TAB ORAL DAILY
COMMUNITY

## 2018-11-20 RX ORDER — CIDER VINEGAR 300 MG
1 TABLET ORAL DAILY
COMMUNITY
End: 2018-11-20

## 2018-11-20 NOTE — PROGRESS NOTES
Service Date: 11/20/2018      PRIMARY CARE PHYSICIAN:  Dr. Kike Ng.      HISTORY OF PRESENT ILLNESS:  I again had the pleasure of seeing your patient, Geoffrey Benjamin (Woody), at [facility] for evaluation of coronary artery disease.  The patient is status post 4-vessel bypass surgery 11/07/2006 utilizing the left internal mammary artery to the LAD and a diagonal branch artery sequentially as well as a saphenous vein graft to the posterior branch of the right coronary artery and a free radial artery to the obtuse marginal branch artery.  His ejection fraction was well maintained.  The last stress echocardiogram 04/04/2013 was normal.  The patient continues to exercise 4-5 times a week walking outside or at the shopping mall for 30-40 minutes.  He denies angina or shortness of breath.  He denies hospitalizations or surgery other than for kidney stones and removal in May and June of this year.  Hemoglobin A1c remains above 8%.  He follows in Dr. Sanchez's office.  He has been intolerant of atorvastatin in the past and I have now asked him to increase the atorvastatin to 10 mg every day.  He believes he is taking it 5 days a week.  He notes occasional morning cramping but denies claudication or other myalgias or myopathy.  He denies PND, orthopnea, peripheral edema, syncope, presyncope or palpitations.  His last fasting lipid profile noted in Dr. Ng's office on 01/04/2018 included total cholesterol of 200, triglycerides 162, HDL 35,  with a ratio of 5.71.      PHYSICAL EXAMINATION:  As listed below.      ASSESSMENT:   1.  Juan Benjamin is a delightful 73-year-old male status post 4-vessel bypass surgery on 11/07/2006 due to severe 3-vessel coronary artery disease and an abnormal stress test.  He denies recurrent angina or congestive heart failure.  We will repeat a stress echocardiogram when I see him next year.  He continues to exercise aerobically free of symptoms.   2.   Hyperlipidemia.  His night cramps are probably not secondary a statin.  I have asked him to increase his statin to 7 days a week.  He will follow up with a lipid profile with Dr. Ng in January.  If his LDL is not below 100 I would suggest consideration of PCSK9 inhibitors to this patient and Dr. Ng.  The patient has been intolerant to Zetia in the past.  If he is tolerating the atorvastatin perhaps the dosage can be increased further rather than adding the PCSK9 inhibitors.      It is my pleasure to assist in the care of Geoffrey Childress.  We did discuss diet, exercise and weight loss.  All his questions were answered to his satisfaction.      Aislinn Cohn MD      cc:   Kike Ng MD    [facility] [place]    [address]    [place]  [address]         AISLINN COHN MD, FACC             D: 2018   T: 2018   MT: MARTHA      Name:     GEOFFREY CHILDRESS   MRN:      [id number]        Account:      [id number]   :      1945           Service Date: 2018      Document: [id number]

## 2018-11-20 NOTE — MR AVS SNAPSHOT
After Visit Summary   11/20/2018    Geoffrey Benjamin    MRN: [id number]           Patient Information     Date Of Birth          1945        Visit Information        Provider Department      11/20/2018 10:45 AM Luigi Obregon MD [facility]        Today's Diagnoses     Coronary artery disease involving native coronary artery of native heart without angina pectoris    -  1    Postsurgical aortocoronary bypass status        Metabolic syndrome        Mixed hyperlipidemia           Follow-ups after your visit        Additional Services     Follow-Up with Cardiologist                 Your next 10 appointments already scheduled     Dec 21, 2018 10:00 AM CST   XR KUB with SHXR3   [facility] ([facility])    [address] [address] [phone]           How do I prepare for my exam? (Food and drink instructions) No Food and Drink Restrictions.  How do I prepare for my exam? (Other instructions) You do not need to do anything special for this exam.  What should I wear: Wear comfortable clothes.  How long does the exam take: Most scans take less than 5 minutes.  What should I bring: Bring a list of your medicines, including vitamins, minerals and over-the-counter drugs. It is safest to leave personal items at home.  Do I need a :  No  is needed.  What do I need to tell my doctor: Tell your doctor if there s any chance you are pregnant.  What should I do after the exam: No restrictions, You may resume normal activities.  What is this test: An image of a specific body part shown in shades of black and white.  Who should I call with questions: If you have any questions, please call the Imaging Department where you will have your exam. Directions, parking instructions, and other information is available on our website, [email or web address].            Dec 21, 2018 11:00 AM CST   Return Visit with  "Alfonso Sinha MD   [facility] [place] ([facility] [place])    [address]  [address]  [facility] [address] [phone]              Future tests that were ordered for you today     Open Future Orders        Priority Expected Expires Ordered    Exercise Stress Echocardiogram Routine 11/20/2019 11/21/2019 11/20/2018    Follow-Up with Cardiologist Routine 11/20/2019 11/21/2019 11/20/2018            Who to contact     If you have questions or need follow up information about today's clinic visit or your schedule please contact [facility]   [place] directly at [phone].  Normal or non-critical lab and imaging results will be communicated to you by MyChart, letter or phone within 4 business days after the clinic has received the results. If you do not hear from us within 7 days, please contact the clinic through MyChart or phone. If you have a critical or abnormal lab result, we will notify you by phone as soon as possible.  Submit refill requests through Bebestore or call your pharmacy and they will forward the refill request to us. Please allow 3 business days for your refill to be completed.          Additional Information About Your Visit        Care EveryWhere ID     This is your Care EveryWhere ID. This could be used by other organizations to access your [facility] medical records  [id number]        Your Vitals Were     Pulse Height BMI (Body Mass Index)             80 1.676 m (5' 6\") 30.67 kg/m2          Blood Pressure from Last 3 Encounters:   11/20/18 112/70   06/22/18 130/80   06/05/18 122/72    Weight from Last 3 Encounters:   11/20/18 86.2 kg (190 lb)   06/22/18 84.4 kg (186 lb)   06/05/18 86.2 kg (190 lb)                 Today's Medication Changes          These changes are accurate as of 11/20/18 11:23 AM.  If you have any questions, ask your nurse or doctor.               These medicines have changed or have updated " prescriptions.        Dose/Directions    glimepiride 2 MG tablet   Commonly known as:  AMARYL   This may have changed:  Another medication with the same name was removed. Continue taking this medication, and follow the directions you see here.   Changed by:  Luigi Obregon MD        Dose:  2 mg   Take 2 mg by mouth every morning (before breakfast)   Refills:  0         Stop taking these medicines if you haven't already. Please contact your care team if you have questions.     Fish Oil 1000 MG Cpdr   Stopped by:  Luigi Obregon MD           REFRESH OPTIVE ADVANCED 0.5-1-0.5 % Soln   Generic drug:  Carboxymeth-Glycerin-Polysorb   Stopped by:  Luigi Obregon MD                    Primary Care Provider Office Phone # Fax #    Kike Tai Ng [phone]       [facility] [address]  [facility] [address]        Equal Access to Services     [facility]: Hadii lary singletary hadasho Soomaali, waaxda luqadaha, qaybta kaalmada adeegyada, waxay idiin hayaan rubi hall . So [facility] [place] [facility] [phone].    ATENCIÓN: Si habla español, tiene a ch disposición servicios gratuitos de asistencia lingüística. [facility] [phone].    We comply with applicable federal civil rights laws and [place] laws. We do not discriminate on the basis of race, color, national origin, age, disability, sex, sexual orientation, or gender identity.            Thank you!     Thank you for choosing [facility]  for your care. Our goal is always to provide you with excellent care. Hearing back from our patients is one way we can continue to improve our services. Please take a few minutes to complete the written survey that you may receive in the mail after your visit with us. Thank you!             Your Updated Medication List - Protect others around you: Learn how to safely use, store and throw away your medicines at www.disposemymeds.org.          This list is accurate as of  11/20/18 11:23 AM.  Always use your most recent med list.                   Brand Name Dispense Instructions for use Diagnosis    ALLEGRA 180 MG tablet   Generic drug:  fexofenadine      Take 180 mg by mouth Allergy season        aspirin 325 MG tablet      Take 325 mg by mouth daily        ATENOLOL PO      Take 25 mg by mouth daily        ATORVASTATIN CALCIUM PO      Take 10 mg by mouth daily        CIALIS 20 MG tablet   Generic drug:  tadalafil      Take 20 mg by mouth as needed for erectile dysfunction        Co Q-10 300 MG Caps      Take 1 capsule by mouth daily        glimepiride 2 MG tablet    AMARYL     Take 2 mg by mouth every morning (before breakfast)        glucosamine chondroitin 1500 complex Caps      Take 2 tablets by mouth daily        hydrochlorothiazide 25 MG tablet    HYDRODIURIL     Take 25 mg by mouth daily        LISINOPRIL PO      Take 2.5 mg by mouth daily        metFORMIN 750 MG 24 hr tablet    GLUCOPHAGE-XR     Take 750 mg by mouth 2 times daily        MINOCYCLINE HCL PO      Take 100 mg by mouth daily        Multi-vitamin Tabs tablet      Take 1 tablet by mouth daily        ONETOUCH VERIO IQ test strip   Generic drug:  blood glucose monitoring      3 times daily.        PRILOSEC PO      Take 20 mg by mouth every morning        SUDAFED PO      Take by mouth as needed for congestion        SYNTHROID PO      Take 112 mcg by mouth daily        tamsulosin 0.4 MG capsule    FLOMAX    30 capsule    Take 1 capsule (0.4 mg) by mouth daily 30 minutes after evening meal Do not take within 4 hours of Cialis    Benign prostatic hyperplasia with weak urinary stream       TEARS NATURALE OP      REFRESH As directed        TESTOSTERONE AQUEOUS IM      Inject 250 mg into the muscle Every Three Weeks        vitamin B complex with vitamin C Tabs tablet      Take 1 tablet by mouth daily        ZANTAC PO      OTC- occasional after certain foods

## 2018-11-20 NOTE — LETTER
11/20/2018      Kike Ng MD  [employer] [address]  [facility] [address]      RE: Geoffrey Benjamin       Dear Colleague,    I had the pleasure of seeing Geoffrey Benjamin in the [facility].    Service Date: 11/20/2018      PRIMARY CARE PHYSICIAN:  Dr. Kike Ng.      HISTORY OF PRESENT ILLNESS:  I again had the pleasure of seeing your patient, Geoffrey Benjamin (Woody), at [facility] for evaluation of coronary artery disease.  The patient is status post 4-vessel bypass surgery 11/07/2006 utilizing the left internal mammary artery to the LAD and a diagonal branch artery sequentially as well as a saphenous vein graft to the posterior branch of the right coronary artery and a free radial artery to the obtuse marginal branch artery.  His ejection fraction was well maintained.  The last stress echocardiogram 04/04/2013 was normal.  The patient continues to exercise 4-5 times a week walking outside or at the shopping mall for 30-40 minutes.  He denies angina or shortness of breath.  He denies hospitalizations or surgery other than for kidney stones and removal in May and June of this year.  Hemoglobin A1c remains above 8%.  He follows in Dr. Sanchez's office.  He has been intolerant of atorvastatin in the past and I have now asked him to increase the atorvastatin to 10 mg every day.  He believes he is taking it 5 days a week.  He notes occasional morning cramping but denies claudication or other myalgias or myopathy.  He denies PND, orthopnea, peripheral edema, syncope, presyncope or palpitations.  His last fasting lipid profile noted in Dr. Ng's office on 01/04/2018 included total cholesterol of 200, triglycerides 162, HDL 35,  with a ratio of 5.71.      PHYSICAL EXAMINATION:  As listed below.      ASSESSMENT:   1José Luis Benjamin is a delightful 73-year-old male status post 4-vessel bypass surgery on 11/07/2006 due to severe 3-vessel  coronary artery disease and an abnormal stress test.  He denies recurrent angina or congestive heart failure.  We will repeat a stress echocardiogram when I see him next year.  He continues to exercise aerobically free of symptoms.   2.  Hyperlipidemia.  His night cramps are probably not secondary a statin.  I have asked him to increase his statin to 7 days a week.  He will follow up with a lipid profile with Dr. Ng in January.  If his LDL is not below 100 I would suggest consideration of PCSK9 inhibitors to this patient and Dr. Ng.  The patient has been intolerant to Zetia in the past.  If he is tolerating the atorvastatin perhaps the dosage can be increased further rather than adding the PCSK9 inhibitors.      It is my pleasure to assist in the care of Geoffrey Childress.  We did discuss diet, exercise and weight loss.  All his questions were answered to his satisfaction.      Aislinn Cohn MD      cc:   Kike Ng MD    [facility]    [address]    [place]  [address]         AISLINN COHN MD[facility]             D: 2018   T: 2018   MT: MARTHA      Name:     GEOFFREY CHILDRESS   MRN:      [id number]        Account:      [id number]   :      1945           Service Date: 2018      Document: [id number]         Outpatient Encounter Prescriptions as of 2018   Medication Sig Dispense Refill     Artificial Tear Solution (TEARS NATURALE OP) REFRESH As directed       aspirin 325 MG tablet Take 325 mg by mouth daily       ATENOLOL PO Take 25 mg by mouth daily       ATORVASTATIN CALCIUM PO Take 10 mg by mouth daily       Coenzyme Q10 (CO Q-10) 300 MG CAPS Take 1 capsule by mouth daily       fexofenadine (ALLEGRA) 180 MG tablet Take 180 mg by mouth Allergy season       glimepiride (AMARYL) 2 MG tablet Take 2 mg by mouth every morning (before breakfast)       Glucosamine-Chondroit-Vit C-Mn (GLUCOSAMINE CHONDROITIN 1500 COMPLEX) CAPS Take 2 tablets by  mouth daily        hydrochlorothiazide (HYDRODIURIL) 25 MG tablet Take 25 mg by mouth daily       Levothyroxine Sodium (SYNTHROID PO) Take 112 mcg by mouth daily       LISINOPRIL PO Take 2.5 mg by mouth daily       metFORMIN (GLUCOPHAGE-XR) 750 MG 24 hr tablet Take 750 mg by mouth 2 times daily       MINOCYCLINE HCL PO Take 100 mg by mouth daily       multivitamin, therapeutic with minerals (MULTI-VITAMIN) TABS tablet Take 1 tablet by mouth daily       Omeprazole (PRILOSEC PO) Take 20 mg by mouth every morning       Pseudoephedrine HCl (SUDAFED PO) Take by mouth as needed for congestion       RaNITidine HCl (ZANTAC PO) OTC- occasional after certain foods       tadalafil (CIALIS) 20 MG tablet Take 20 mg by mouth as needed for erectile dysfunction       tamsulosin (FLOMAX) 0.4 MG capsule Take 1 capsule (0.4 mg) by mouth daily 30 minutes after evening meal  Do not take within 4 hours of Cialis 30 capsule 11     TESTOSTERONE AQUEOUS IM Inject 250 mg into the muscle Every Three Weeks       vitamin  B complex with vitamin C (VITAMIN  B COMPLEX) TABS Take 1 tablet by mouth daily       blood glucose monitoring (ONETOUCH VERIO IQ) test strip 3 times daily.       [DISCONTINUED] Carboxymeth-Glycerin-Polysorb (REFRESH OPTIVE ADVANCED) 0.5-1-0.5 % SOLN Apply to eye 2 times daily       [DISCONTINUED] doxycycline monohydrate 100 MG capsule TAKE 1 CAPSULE BY MOUTH TWICE A DAY  4     [DISCONTINUED] doxycycline Monohydrate 100 MG TABS Take 100 mg by mouth       [DISCONTINUED] glimepiride (AMARYL) 4 MG tablet Take 4 mg by mouth daily  2     [DISCONTINUED] Omega-3 Fatty Acids (FISH OIL) 1000 MG CPDR Take 1 capsule by mouth daily       [DISCONTINUED] oxyCODONE IR (ROXICODONE) 5 MG tablet Take 1-2 tablets (5-10 mg) by mouth every 3 hours as needed for other (Moderate to Severe Pain) 15 tablet 0     [DISCONTINUED] senna-docusate (SENOKOT-S;PERICOLACE) 8.6-50 MG per tablet Take 1-2 tablets by mouth 2 times daily as needed for constipation  To prevent constipation while taking narcotic pain medication. Start with 1 tablet twice daily. If no bowel movement in 24 hours, increase to 2 tablets twice daily.  Discontinue if you have loose stools or when you are no longer taking narcotics. 20 tablet 0     [DISCONTINUED] triprolidine-pseudoePHEDrine (WAL-ACT) 2.5-60 MG TABS per tablet 1 po qhs for allergy       No facility-administered encounter medications on file as of 11/20/2018.        Again, thank you for allowing me to participate in the care of your patient.      Sincerely,    Luigi Obregon MD     [facility]

## 2018-11-20 NOTE — LETTER
11/20/2018    Kike Ng MD  [employer] [address]  [facility] [address]    RE: Geoffrey Benjamin       Dear Colleague,    I had the pleasure of seeing Geoffrey Benjamin in the [facility].    HPI and Plan:   See dictation:[id number]    Orders Placed This Encounter   Procedures     Follow-Up with Cardiologist     Exercise Stress Echocardiogram       Orders Placed This Encounter   Medications     hydrochlorothiazide (HYDRODIURIL) 25 MG tablet     Sig: Take 25 mg by mouth daily     aspirin 325 MG tablet     Sig: Take 325 mg by mouth daily     Coenzyme Q10 (CO Q-10) 300 MG CAPS     Sig: Take 1 capsule by mouth daily     DISCONTD: Omega-3 Fatty Acids (FISH OIL) 1000 MG CPDR     Sig: Take 1 capsule by mouth daily     multivitamin, therapeutic with minerals (MULTI-VITAMIN) TABS tablet     Sig: Take 1 tablet by mouth daily     DISCONTD: Carboxymeth-Glycerin-Polysorb (REFRESH OPTIVE ADVANCED) 0.5-1-0.5 % SOLN     Sig: Apply to eye 2 times daily       Medications Discontinued During This Encounter   Medication Reason     glimepiride (AMARYL) 4 MG tablet Dose adjustment     oxyCODONE IR (ROXICODONE) 5 MG tablet Therapy completed     doxycycline monohydrate 100 MG capsule Medication Reconciliation Clean Up     doxycycline Monohydrate 100 MG TABS Medication Reconciliation Clean Up     senna-docusate (SENOKOT-S;PERICOLACE) 8.6-50 MG per tablet Therapy completed     triprolidine-pseudoePHEDrine (WAL-ACT) 2.5-60 MG TABS per tablet Medication Reconciliation Clean Up     Carboxymeth-Glycerin-Polysorb (REFRESH OPTIVE ADVANCED) 0.5-1-0.5 % SOLN Alternate therapy     Omega-3 Fatty Acids (FISH OIL) 1000 MG CPDR          Encounter Diagnoses   Name Primary?     Coronary artery disease involving native coronary artery of native heart without angina pectoris Yes     Postsurgical aortocoronary bypass status      Metabolic syndrome      Mixed hyperlipidemia        CURRENT MEDICATIONS:  Current Outpatient  Prescriptions   Medication Sig Dispense Refill     Artificial Tear Solution (TEARS NATURALE OP) REFRESH As directed       aspirin 325 MG tablet Take 325 mg by mouth daily       ATENOLOL PO Take 25 mg by mouth daily       ATORVASTATIN CALCIUM PO Take 10 mg by mouth daily       Coenzyme Q10 (CO Q-10) 300 MG CAPS Take 1 capsule by mouth daily       fexofenadine (ALLEGRA) 180 MG tablet Take 180 mg by mouth Allergy season       glimepiride (AMARYL) 2 MG tablet Take 2 mg by mouth every morning (before breakfast)       Glucosamine-Chondroit-Vit C-Mn (GLUCOSAMINE CHONDROITIN 1500 COMPLEX) CAPS Take 2 tablets by mouth daily        hydrochlorothiazide (HYDRODIURIL) 25 MG tablet Take 25 mg by mouth daily       Levothyroxine Sodium (SYNTHROID PO) Take 112 mcg by mouth daily       LISINOPRIL PO Take 2.5 mg by mouth daily       metFORMIN (GLUCOPHAGE-XR) 750 MG 24 hr tablet Take 750 mg by mouth 2 times daily       MINOCYCLINE HCL PO Take 100 mg by mouth daily       multivitamin, therapeutic with minerals (MULTI-VITAMIN) TABS tablet Take 1 tablet by mouth daily       Omeprazole (PRILOSEC PO) Take 20 mg by mouth every morning       Pseudoephedrine HCl (SUDAFED PO) Take by mouth as needed for congestion       RaNITidine HCl (ZANTAC PO) OTC- occasional after certain foods       tadalafil (CIALIS) 20 MG tablet Take 20 mg by mouth as needed for erectile dysfunction       tamsulosin (FLOMAX) 0.4 MG capsule Take 1 capsule (0.4 mg) by mouth daily 30 minutes after evening meal  Do not take within 4 hours of Cialis 30 capsule 11     TESTOSTERONE AQUEOUS IM Inject 250 mg into the muscle Every Three Weeks       vitamin  B complex with vitamin C (VITAMIN  B COMPLEX) TABS Take 1 tablet by mouth daily       blood glucose monitoring (ONETOUCH VERIO IQ) test strip 3 times daily.       [DISCONTINUED] glimepiride (AMARYL) 4 MG tablet Take 4 mg by mouth daily  2       ALLERGIES     Allergies   Allergen Reactions     Ezetimibe      Muscle aches      Hmg-Coa-R Inhibitors Nausea and Vomiting     Rosuvastatin Nausea and Vomiting     Simvastatin      Muscle aches       PAST MEDICAL HISTORY:  Past Medical History:   Diagnosis Date     Benign neoplasm of colon      Coronary artery disease     cabg 2006     Depression 1989    off medication since     Diverticulosis      Gastro-oesophageal reflux disease      Gout      Graves disease      Hypertension      Impotence of organic origin      Kidney stone      Metabolic syndrome      Osteoarthritis     hands and back     Other and unspecified hyperlipidemia      PONV (postoperative nausea and vomiting)      Postsurgical aortocoronary bypass status      Type II or unspecified type diabetes mellitus without mention of complication, not stated as uncontrolled      Unspecified hypothyroidism        PAST SURGICAL HISTORY:  Past Surgical History:   Procedure Laterality Date     ARTHROSCOPY KNEE      BILATERAL     CORONARY ANGIOGRAPHY ADULT ORDER  11/2006     CORONARY ARTERY BYPASS  11/7/2006    LIMA to LAD and diagonal branch arteries sequentially, SVG to PDA of RCA, and free radial artery to OM     CYSTOSCOPY       EXTRACORPOREAL SHOCK WAVE LITHOTRIPSY, CYSTOSCOPY, INSERT STENT URETER(S), COMBINED  8/13/2013    Procedure: COMBINED EXTRACORPOREAL SHOCK WAVE LITHOTRIPSY, CYSTOSCOPY, INSERT STENT URETER(S);  CYSTOSCOPY, LEFT URETERAL STENT PLACEMENT, LEFT EXTRACORPOREAL SHOCK WAVE LITHOTRIPSY (ESWL)        FOOT SURGERY      FUSION & TENDON REPAIR     HERNIA REPAIR       LASER HOLMIUM LITHOTRIPSY URETER(S), INSERT STENT, COMBINED Left 5/8/2018    Procedure: COMBINED CYSTOSCOPY, URETEROSCOPY, LASER HOLMIUM LITHOTRIPSY URETER(S), INSERT STENT;  1.  Video cystoscopy.    2.  Balloon dilation left ureter.    3.  Left ureteroscopy with holmium laser lithotripsy and stone extractions.    4.  Left double-J stent insertion (4.7 Citizen of [place] x 24 cm).   5.  Exam under anesthesia.;  Surgeon: Alfonso Sinha MD;  Location:  OR     LASER HOLMIUM  LITHOTRIPSY URETER(S), INSERT STENT, COMBINED Left 6/5/2018    Procedure: COMBINED CYSTOSCOPY, URETEROSCOPY, LASER HOLMIUM LITHOTRIPSY URETER(S), INSERT STENT;  1. Cystourethroscopy  2. Left flexible ureteroscopy with holmium laser lithotripsy and stone extraction  3. Left retrograde pyelography with interpretation of intraoperative fluoroscopic imaging  4. Left ureteral stent placement;  Surgeon: Romeo Cheng MD;  Location: [facility] OR     SURGICAL HISTORY OF -   1988    Septoplasty at 13     SURGICAL HISTORY OF -   1/2005    Ureteral stent and lithotripsy for kidney stones     SURGICAL HISTORY OF -   8/2013    cataract extraction OD       FAMILY HISTORY:  Family History   Problem Relation Age of Onset     Coronary Artery Disease Mother      Coronary Artery Disease Brother      Heart Surgery Brother      CABGx3 at 55     Coronary Artery Disease Brother      Heart Surgery Brother      CABG x2 at 64     Prostate Cancer Father      Family History Negative Brother      Family History Negative Brother        SOCIAL HISTORY:  Social History     Social History     Marital status:      Spouse name: N/A     Number of children: N/A     Years of education: N/A     Social History Main Topics     Smoking status: Former Smoker     Quit date: 1972     Smokeless tobacco: Former User     Alcohol use Yes      Comment: Occasion glass of wine- 1-2 x week     Drug use: No     Sexual activity: Not Asked     Other Topics Concern     Caffeine Concern No     coffee: 2 cups a day  Pop: 2-3 12oz bottles a week     Sleep Concern No     Stress Concern No     Weight Concern No     Special Diet No     Exercise Yes     walking 4-5 times a week     Seat Belt Yes     Social History Narrative       Review of Systems:  Skin:  Negative       Eyes:  Positive for glasses    ENT:  Negative      Respiratory:  Negative       Cardiovascular:    Positive for (occasional right side heaviness not related to activity x 1 month)   "  Gastroenterology: Positive for heartburn (stable)    Genitourinary:  not assessed      Musculoskeletal:  Positive for joint pain;nocturnal cramping (increased cramps in right leg. in the mornings)    Neurologic:  Negative      Psychiatric:  Negative      Heme/Lymph/Imm:  Positive for allergies;hay fever    Endocrine:  Positive for thyroid disorder;diabetes      Physical Exam:  Vitals: /70  Pulse 80  Ht 1.676 m (5' 6\")  Wt 86.2 kg (190 lb)  BMI 30.67 kg/m2    Constitutional:  cooperative, alert and oriented, well developed, well nourished, in no acute distress        Skin:  warm and dry to the touch, no apparent skin lesions or masses noted          Head:  normocephalic, no masses or lesions        Eyes:  pupils equal and round;conjunctivae and lids unremarkable;sclera white;no xanthalasma;EOMS intact        Lymph:      ENT:  no pallor or cyanosis, dentition good        Neck:  carotid pulses are full and equal bilaterally, JVP normal, no carotid bruit        Respiratory:  normal breath sounds, clear to auscultation, normal A-P diameter, normal symmetry, normal respiratory excursion, no use of accessory muscles;healed median sternotomy scar         Cardiac: regular rhythm, normal S1/S2, no S3 or S4, apical impulse not displaced, no murmurs, gallops or rubs                pulses full and equal, no bruits auscultated pulses below the femoral arteries are diminished                                      GI:  abdomen soft, non-tender, BS normoactive, no mass, no HSM, no bruits        Extremities and Muscular Skeletal:  no deformities, clubbing, cyanosis, erythema observed;no edema         left radial harvest site is well healed with excellent hand filling, left venotomy scar is well healed.    Neurological:  no gross motor deficits;affect appropriate        Psych:  Alert and Oriented x 3        CC  Kike Ng MD  [facility]  [address]  [place] [address]                Thank you for allowing " me to participate in the care of your patient.      Sincerely,     Luigi Obregon MD     [facility]    cc:   Kike Ng MD  [facility]  [address]  [place] [address]

## 2018-11-20 NOTE — PROGRESS NOTES
HPI and Plan:   See dictation:[id number]    Orders Placed This Encounter   Procedures     Follow-Up with Cardiologist     Exercise Stress Echocardiogram       Orders Placed This Encounter   Medications     hydrochlorothiazide (HYDRODIURIL) 25 MG tablet     Sig: Take 25 mg by mouth daily     aspirin 325 MG tablet     Sig: Take 325 mg by mouth daily     Coenzyme Q10 (CO Q-10) 300 MG CAPS     Sig: Take 1 capsule by mouth daily     DISCONTD: Omega-3 Fatty Acids (FISH OIL) 1000 MG CPDR     Sig: Take 1 capsule by mouth daily     multivitamin, therapeutic with minerals (MULTI-VITAMIN) TABS tablet     Sig: Take 1 tablet by mouth daily     DISCONTD: Carboxymeth-Glycerin-Polysorb (REFRESH OPTIVE ADVANCED) 0.5-1-0.5 % SOLN     Sig: Apply to eye 2 times daily       Medications Discontinued During This Encounter   Medication Reason     glimepiride (AMARYL) 4 MG tablet Dose adjustment     oxyCODONE IR (ROXICODONE) 5 MG tablet Therapy completed     doxycycline monohydrate 100 MG capsule Medication Reconciliation Clean Up     doxycycline Monohydrate 100 MG TABS Medication Reconciliation Clean Up     senna-docusate (SENOKOT-S;PERICOLACE) 8.6-50 MG per tablet Therapy completed     triprolidine-pseudoePHEDrine (WAL-ACT) 2.5-60 MG TABS per tablet Medication Reconciliation Clean Up     Carboxymeth-Glycerin-Polysorb (REFRESH OPTIVE ADVANCED) 0.5-1-0.5 % SOLN Alternate therapy     Omega-3 Fatty Acids (FISH OIL) 1000 MG CPDR          Encounter Diagnoses   Name Primary?     Coronary artery disease involving native coronary artery of native heart without angina pectoris Yes     Postsurgical aortocoronary bypass status      Metabolic syndrome      Mixed hyperlipidemia        CURRENT MEDICATIONS:  Current Outpatient Prescriptions   Medication Sig Dispense Refill     Artificial Tear Solution (TEARS NATURALE OP) REFRESH As directed       aspirin 325 MG tablet Take 325 mg by mouth daily       ATENOLOL PO Take 25 mg by mouth daily       ATORVASTATIN  CALCIUM PO Take 10 mg by mouth daily       Coenzyme Q10 (CO Q-10) 300 MG CAPS Take 1 capsule by mouth daily       fexofenadine (ALLEGRA) 180 MG tablet Take 180 mg by mouth Allergy season       glimepiride (AMARYL) 2 MG tablet Take 2 mg by mouth every morning (before breakfast)       Glucosamine-Chondroit-Vit C-Mn (GLUCOSAMINE CHONDROITIN 1500 COMPLEX) CAPS Take 2 tablets by mouth daily        hydrochlorothiazide (HYDRODIURIL) 25 MG tablet Take 25 mg by mouth daily       Levothyroxine Sodium (SYNTHROID PO) Take 112 mcg by mouth daily       LISINOPRIL PO Take 2.5 mg by mouth daily       metFORMIN (GLUCOPHAGE-XR) 750 MG 24 hr tablet Take 750 mg by mouth 2 times daily       MINOCYCLINE HCL PO Take 100 mg by mouth daily       multivitamin, therapeutic with minerals (MULTI-VITAMIN) TABS tablet Take 1 tablet by mouth daily       Omeprazole (PRILOSEC PO) Take 20 mg by mouth every morning       Pseudoephedrine HCl (SUDAFED PO) Take by mouth as needed for congestion       RaNITidine HCl (ZANTAC PO) OTC- occasional after certain foods       tadalafil (CIALIS) 20 MG tablet Take 20 mg by mouth as needed for erectile dysfunction       tamsulosin (FLOMAX) 0.4 MG capsule Take 1 capsule (0.4 mg) by mouth daily 30 minutes after evening meal  Do not take within 4 hours of Cialis 30 capsule 11     TESTOSTERONE AQUEOUS IM Inject 250 mg into the muscle Every Three Weeks       vitamin  B complex with vitamin C (VITAMIN  B COMPLEX) TABS Take 1 tablet by mouth daily       blood glucose monitoring (ONETOUCH VERIO IQ) test strip 3 times daily.       [DISCONTINUED] glimepiride (AMARYL) 4 MG tablet Take 4 mg by mouth daily  2       ALLERGIES     Allergies   Allergen Reactions     Ezetimibe      Muscle aches     Hmg-Coa-R Inhibitors Nausea and Vomiting     Rosuvastatin Nausea and Vomiting     Simvastatin      Muscle aches       PAST MEDICAL HISTORY:  Past Medical History:   Diagnosis Date     Benign neoplasm of colon      Coronary artery disease      cabg 2006     Depression 1989    off medication since     Diverticulosis      Gastro-oesophageal reflux disease      Gout      Graves disease      Hypertension      Impotence of organic origin      Kidney stone      Metabolic syndrome      Osteoarthritis     hands and back     Other and unspecified hyperlipidemia      PONV (postoperative nausea and vomiting)      Postsurgical aortocoronary bypass status      Type II or unspecified type diabetes mellitus without mention of complication, not stated as uncontrolled      Unspecified hypothyroidism        PAST SURGICAL HISTORY:  Past Surgical History:   Procedure Laterality Date     ARTHROSCOPY KNEE      BILATERAL     CORONARY ANGIOGRAPHY ADULT ORDER  11/2006     CORONARY ARTERY BYPASS  11/7/2006    LIMA to LAD and diagonal branch arteries sequentially, SVG to PDA of RCA, and free radial artery to OM     CYSTOSCOPY       EXTRACORPOREAL SHOCK WAVE LITHOTRIPSY, CYSTOSCOPY, INSERT STENT URETER(S), COMBINED  8/13/2013    Procedure: COMBINED EXTRACORPOREAL SHOCK WAVE LITHOTRIPSY, CYSTOSCOPY, INSERT STENT URETER(S);  CYSTOSCOPY, LEFT URETERAL STENT PLACEMENT, LEFT EXTRACORPOREAL SHOCK WAVE LITHOTRIPSY (ESWL)        FOOT SURGERY      FUSION & TENDON REPAIR     HERNIA REPAIR       LASER HOLMIUM LITHOTRIPSY URETER(S), INSERT STENT, COMBINED Left 5/8/2018    Procedure: COMBINED CYSTOSCOPY, URETEROSCOPY, LASER HOLMIUM LITHOTRIPSY URETER(S), INSERT STENT;  1.  Video cystoscopy.    2.  Balloon dilation left ureter.    3.  Left ureteroscopy with holmium laser lithotripsy and stone extractions.    4.  Left double-J stent insertion (4.7 Wolof x 24 cm).   5.  Exam under anesthesia.;  Surgeon: Alfonso Sinha MD;  Location: [facility] OR     LASER HOLMIUM LITHOTRIPSY URETER(S), INSERT STENT, COMBINED Left 6/5/2018    Procedure: COMBINED CYSTOSCOPY, URETEROSCOPY, LASER HOLMIUM LITHOTRIPSY URETER(S), INSERT STENT;  1. Cystourethroscopy  2. Left flexible ureteroscopy with holmium laser  lithotripsy and stone extraction  3. Left retrograde pyelography with interpretation of intraoperative fluoroscopic imaging  4. Left ureteral stent placement;  Surgeon: Romeo Cheng MD;  Location: [facility] OR     SURGICAL HISTORY OF -   1988    Septoplasty at 13     SURGICAL HISTORY OF -   1/2005    Ureteral stent and lithotripsy for kidney stones     SURGICAL HISTORY OF -   8/2013    cataract extraction OD       FAMILY HISTORY:  Family History   Problem Relation Age of Onset     Coronary Artery Disease Mother      Coronary Artery Disease Brother      Heart Surgery Brother      CABGx3 at 55     Coronary Artery Disease Brother      Heart Surgery Brother      CABG x2 at 64     Prostate Cancer Father      Family History Negative Brother      Family History Negative Brother        SOCIAL HISTORY:  Social History     Social History     Marital status:      Spouse name: N/A     Number of children: N/A     Years of education: N/A     Social History Main Topics     Smoking status: Former Smoker     Quit date: 1972     Smokeless tobacco: Former User     Alcohol use Yes      Comment: Occasion glass of wine- 1-2 x week     Drug use: No     Sexual activity: Not Asked     Other Topics Concern     Caffeine Concern No     coffee: 2 cups a day  Pop: 2-3 12oz bottles a week     Sleep Concern No     Stress Concern No     Weight Concern No     Special Diet No     Exercise Yes     walking 4-5 times a week     Seat Belt Yes     Social History Narrative       Review of Systems:  Skin:  Negative       Eyes:  Positive for glasses    ENT:  Negative      Respiratory:  Negative       Cardiovascular:    Positive for (occasional right side heaviness not related to activity x 1 month)    Gastroenterology: Positive for heartburn (stable)    Genitourinary:  not assessed      Musculoskeletal:  Positive for joint pain;nocturnal cramping (increased cramps in right leg. in the mornings)    Neurologic:  Negative      Psychiatric:   "Negative      Heme/Lymph/Imm:  Positive for allergies;hay fever    Endocrine:  Positive for thyroid disorder;diabetes      Physical Exam:  Vitals: /70  Pulse 80  Ht 1.676 m (5' 6\")  Wt 86.2 kg (190 lb)  BMI 30.67 kg/m2    Constitutional:  cooperative, alert and oriented, well developed, well nourished, in no acute distress        Skin:  warm and dry to the touch, no apparent skin lesions or masses noted          Head:  normocephalic, no masses or lesions        Eyes:  pupils equal and round;conjunctivae and lids unremarkable;sclera white;no xanthalasma;EOMS intact        Lymph:      ENT:  no pallor or cyanosis, dentition good        Neck:  carotid pulses are full and equal bilaterally, JVP normal, no carotid bruit        Respiratory:  normal breath sounds, clear to auscultation, normal A-P diameter, normal symmetry, normal respiratory excursion, no use of accessory muscles;healed median sternotomy scar         Cardiac: regular rhythm, normal S1/S2, no S3 or S4, apical impulse not displaced, no murmurs, gallops or rubs                pulses full and equal, no bruits auscultated pulses below the femoral arteries are diminished                                      GI:  abdomen soft, non-tender, BS normoactive, no mass, no HSM, no bruits        Extremities and Muscular Skeletal:  no deformities, clubbing, cyanosis, erythema observed;no edema         left radial harvest site is well healed with excellent hand filling, left venotomy scar is well healed.    Neurological:  no gross motor deficits;affect appropriate        Psych:  Alert and Oriented x 3        CC  Kike Ng MD  [facility]  [address]  [place] [address]              "

## 2018-12-20 ENCOUNTER — OFFICE VISIT (OUTPATIENT)
Dept: UROLOGY | Facility: CLINIC | Age: 73
End: 2018-12-20
Payer: COMMERCIAL

## 2018-12-20 DIAGNOSIS — N20.0 CALCULUS OF LEFT KIDNEY: Primary | ICD-10-CM

## 2018-12-20 DIAGNOSIS — Z53.9 ERRONEOUS ENCOUNTER--DISREGARD: Primary | ICD-10-CM

## 2018-12-20 NOTE — LETTER
Date:February 8, 2019      Provider requested that no letter be sent. Do not send.       [facility] Health Information

## 2018-12-20 NOTE — LETTER
12/20/2018       RE: Geoffrey Benjamin  [address] [address]     Dear Colleague,    Thank you for referring your patient, Geoffrey Benjamin, to the [facility] UROLOGY CLINIC [place] at [facility]. Please see a copy of my visit note below.    No notes on file    Again, thank you for allowing me to participate in the care of your patient.      Sincerely,    Alfonso Sinha MD

## 2018-12-20 NOTE — LETTER
12/20/2018      RE: Geoffrey Benjamin  [address] [facility] [address]       Dear Colleague,    Thank you for the opportunity to participate in the care of your patient, Geoffrey Benjamin, at the [facility] UROLOGY CLINIC [place] at [facility]. Please see a copy of my visit note below.    No notes on file    Please do not hesitate to contact me if you have any questions/concerns.     Sincerely,     Alfonso Sinha MD

## 2018-12-21 ENCOUNTER — OFFICE VISIT (OUTPATIENT)
Dept: UROLOGY | Facility: CLINIC | Age: 73
End: 2018-12-21
Payer: COMMERCIAL

## 2018-12-21 ENCOUNTER — HOSPITAL ENCOUNTER (OUTPATIENT)
Dept: GENERAL RADIOLOGY | Facility: CLINIC | Age: 73
Discharge: HOME | End: 2018-12-21
Attending: UROLOGY | Admitting: UROLOGY
Payer: MEDICARE

## 2018-12-21 VITALS
BODY MASS INDEX: 29.57 KG/M2 | DIASTOLIC BLOOD PRESSURE: 70 MMHG | HEART RATE: 76 BPM | SYSTOLIC BLOOD PRESSURE: 114 MMHG | WEIGHT: 184 LBS | OXYGEN SATURATION: 97 % | HEIGHT: 66 IN

## 2018-12-21 DIAGNOSIS — N20.0 CALCULUS OF LEFT KIDNEY: ICD-10-CM

## 2018-12-21 LAB
ALBUMIN UR-MCNC: NEGATIVE MG/DL
APPEARANCE UR: CLEAR
BILIRUB UR QL STRIP: NEGATIVE
COLOR UR AUTO: YELLOW
GLUCOSE UR STRIP-MCNC: 100 MG/DL
HGB UR QL STRIP: NEGATIVE
KETONES UR STRIP-MCNC: NEGATIVE MG/DL
LEUKOCYTE ESTERASE UR QL STRIP: NEGATIVE
NITRATE UR QL: NEGATIVE
PH UR STRIP: 5.5 PH (ref 5–7)
SOURCE: ABNORMAL
SP GR UR STRIP: 1.02 (ref 1–1.03)
UROBILINOGEN UR STRIP-ACNC: 0.2 EU/DL (ref 0.2–1)

## 2018-12-21 PROCEDURE — 81003 URINALYSIS AUTO W/O SCOPE: CPT | Performed by: UROLOGY

## 2018-12-21 PROCEDURE — 74019 RADEX ABDOMEN 2 VIEWS: CPT

## 2018-12-21 PROCEDURE — 99213 OFFICE O/P EST LOW 20 MIN: CPT | Performed by: UROLOGY

## 2018-12-21 ASSESSMENT — PAIN SCALES - GENERAL: PAINLEVEL: NO PAIN (0)

## 2018-12-21 ASSESSMENT — MIFFLIN-ST. JEOR: SCORE: 1522.37

## 2018-12-21 NOTE — NURSING NOTE
Chief Complaint   Patient presents with     Calculus of left kidney     Pt is here to review KUB.      Kirsten Altman, CMA

## 2018-12-21 NOTE — LETTER
12/21/2018      RE: Geoffrey Benjamin  [address] [facility] [address]       Geoffrey Benjamin Is a 73-year-old male with a history of calcium urolithiasis. He also has hypogonadism and is on a testosterone supplement.  He is having no flank pain  Or hematuria. His KUB this morning shows 25 mm stones in the left lower kidney. He was treated last year with lithotripsy for 3 renal stones and had a left ureteroscopy separately. His stone analysis shows calcium oxalate monohydrate.He has normal renal function and a normal serum calcium level.  He did visit with the nephrologist and he is now on a diuretic and eating less salt in his diet.  Other past medical history:Colonic polyps, coronary artery disease, history of depression, history of diverticulosis, GERD, gout, Graves' disease, hypertension, erectile dysfunction, metabolic syndrome, osteoarthritis, type 2 diabetes, hypothyroidism, Former smoker  Family history: Prostate cancer  Medications:Artificial tears, regular aspirin, atenolol, Lipitor, coenzyme Q10, Allegra, glimepiride, glucosamine/chondroitin/vitamin C,, hydrochlorothiazide, levothyroxine, lisinopril, metformin, minocycline, multivitamin, omeprazole, Sudafed, Zantac Cialis, Flomax, testosterone, vitamin B complex with C  Allergies:Reviewed  Exam: Alert and oriented, normal vital signs, normal appearance, normocephalic, normal respiration, neuro grossly intact  Assessment: 2 stones left kidney that are asymptomatic, history of calcium oxalate urolithiasis, family history of prostate cancer  Plan:  KUB in 1 year. Follow-up with nephrology as scheduled.PSA and RYAN with primary care yearly    Alfonso Sinha MD

## 2018-12-21 NOTE — PROGRESS NOTES
Geoffrey Benjamin Is a 73-year-old male with a history of calcium urolithiasis. He also has hypogonadism and is on a testosterone supplement.  He is having no flank pain  Or hematuria. His KUB this morning shows 25 mm stones in the left lower kidney. He was treated last year with lithotripsy for 3 renal stones and had a left ureteroscopy separately. His stone analysis shows calcium oxalate monohydrate.He has normal renal function and a normal serum calcium level.  He did visit with the nephrologist and he is now on a diuretic and eating less salt in his diet.  Other past medical history:Colonic polyps, coronary artery disease, history of depression, history of diverticulosis, GERD, gout, Graves' disease, hypertension, erectile dysfunction, metabolic syndrome, osteoarthritis, type 2 diabetes, hypothyroidism, Former smoker  Family history: Prostate cancer  Medications:Artificial tears, regular aspirin, atenolol, Lipitor, coenzyme Q10, Allegra, glimepiride, glucosamine/chondroitin/vitamin C,, hydrochlorothiazide, levothyroxine, lisinopril, metformin, minocycline, multivitamin, omeprazole, Sudafed, Zantac Cialis, Flomax, testosterone, vitamin B complex with C  Allergies:Reviewed  Exam: Alert and oriented, normal vital signs, normal appearance, normocephalic, normal respiration, neuro grossly intact  Assessment: 2 stones left kidney that are asymptomatic, history of calcium oxalate urolithiasis, family history of prostate cancer  Plan:  KUB in 1 year. Follow-up with nephrology as scheduled.PSA and RYAN with primary care yearly

## 2018-12-21 NOTE — LETTER
12/21/2018       RE: Geoffrey Benjamin  [address] [address]     Dear Colleague,    Thank you for referring your patient, Geoffrey Benjamin, to the [facility] UROLOGY CLINIC [place] at [facility]. Please see a copy of my visit note below.    Geoffrey Benjamin Is a 73-year-old male with a history of calcium urolithiasis. He also has hypogonadism and is on a testosterone supplement.  He is having no flank pain  Or hematuria. His KUB this morning shows 25 mm stones in the left lower kidney. He was treated last year with lithotripsy for 3 renal stones and had a left ureteroscopy separately. His stone analysis shows calcium oxalate monohydrate.He has normal renal function and a normal serum calcium level.  He did visit with the nephrologist and he is now on a diuretic and eating less salt in his diet.  Other past medical history:Colonic polyps, coronary artery disease, history of depression, history of diverticulosis, GERD, gout, Graves' disease, hypertension, erectile dysfunction, metabolic syndrome, osteoarthritis, type 2 diabetes, hypothyroidism, Former smoker  Family history: Prostate cancer  Medications:Artificial tears, regular aspirin, atenolol, Lipitor, coenzyme Q10, Allegra, glimepiride, glucosamine/chondroitin/vitamin C,, hydrochlorothiazide, levothyroxine, lisinopril, metformin, minocycline, multivitamin, omeprazole, Sudafed, Zantac Cialis, Flomax, testosterone, vitamin B complex with C  Allergies:Reviewed  Exam: Alert and oriented, normal vital signs, normal appearance, normocephalic, normal respiration, neuro grossly intact  Assessment: 2 stones left kidney that are asymptomatic, history of calcium oxalate urolithiasis, family history of prostate cancer  Plan:  KUB in 1 year. Follow-up with nephrology as scheduled.PSA and RYAN with primary care yearly    Alfonso Sinha MD

## 2019-02-12 DIAGNOSIS — N40.1 BENIGN PROSTATIC HYPERPLASIA WITH WEAK URINARY STREAM: ICD-10-CM

## 2019-02-12 DIAGNOSIS — R39.12 BENIGN PROSTATIC HYPERPLASIA WITH WEAK URINARY STREAM: ICD-10-CM

## 2019-02-13 RX ORDER — TAMSULOSIN HYDROCHLORIDE 0.4 MG/1
CAPSULE ORAL
Qty: 30 CAPSULE | Refills: 11 | Status: SHIPPED | OUTPATIENT
Start: 2019-02-13 | End: 2019-12-06

## 2019-04-10 DIAGNOSIS — R97.20 RISING PSA LEVEL: Primary | ICD-10-CM

## 2019-04-17 ENCOUNTER — OFFICE VISIT (OUTPATIENT)
Dept: UROLOGY | Facility: CLINIC | Age: 74
End: 2019-04-17
Payer: MEDICARE

## 2019-04-17 VITALS
SYSTOLIC BLOOD PRESSURE: 132 MMHG | WEIGHT: 184 LBS | DIASTOLIC BLOOD PRESSURE: 60 MMHG | BODY MASS INDEX: 29.57 KG/M2 | HEIGHT: 66 IN | HEART RATE: 72 BPM

## 2019-04-17 DIAGNOSIS — R97.20 ELEVATED PSA: Primary | ICD-10-CM

## 2019-04-17 LAB
ALBUMIN UR-MCNC: NEGATIVE MG/DL
APPEARANCE UR: CLEAR
BILIRUB UR QL STRIP: NEGATIVE
COLOR UR AUTO: YELLOW
GLUCOSE UR STRIP-MCNC: >=1000 MG/DL
HGB UR QL STRIP: NEGATIVE
KETONES UR STRIP-MCNC: NEGATIVE MG/DL
LEUKOCYTE ESTERASE UR QL STRIP: NEGATIVE
NITRATE UR QL: NEGATIVE
PH UR STRIP: 5.5 PH (ref 5–7)
SOURCE: ABNORMAL
SP GR UR STRIP: 1.01 (ref 1–1.03)
UROBILINOGEN UR STRIP-ACNC: 0.2 EU/DL (ref 0.2–1)

## 2019-04-17 PROCEDURE — 81003 URINALYSIS AUTO W/O SCOPE: CPT | Performed by: UROLOGY

## 2019-04-17 PROCEDURE — 99213 OFFICE O/P EST LOW 20 MIN: CPT | Performed by: UROLOGY

## 2019-04-17 ASSESSMENT — PAIN SCALES - GENERAL: PAINLEVEL: NO PAIN (0)

## 2019-04-17 ASSESSMENT — MIFFLIN-ST. JEOR: SCORE: 1522.37

## 2019-04-17 NOTE — PROGRESS NOTES
Geoffrey Benjamin is a very pleasant 73-year-old male with a recent PSA of 2.44.  He has had no difficulty voiding, hematuria or dysuria and has a normal urinalysis today except for significant glucosuria.  He is sexually active and may have had an ejaculation a few days before his PSA was done.  He also had a digital rectal exam immediately before the blood was drawn and this could falsely elevate the number.  In addition he sometimes inserts a toy into the rectum to promote a more intense ejaculation.  He does not have ejaculatory pain or burning or blood.  Other past medical history: Urolithiasis-I last saw him in December.  Coronary artery disease, colonic polyps, depression, diverticulosis, GERD, gout, Graves' disease, hypertension, metabolic syndrome, osteoarthritis, type 2 diabetes, hypothyroid, former smoker.  Surgeries include foot fusion and tendon repair, ESWL, coronary artery bypass, septoplasty, cystoscopy, holmium laser lithotripsy for stones.  Family history: Coronary artery disease, father had prostate cancer  Medications: Artificial tears, regular aspirin, atenolol, atorvastatin enzyme Q10, Allegra, glimepiride, glucosamine/chondroid, hydrochlorothiazide, Synthroid, lisinopril, metformin, minocycline, multivitamin, omeprazole, Sudafed, Zantac, Cialis, Flomax, testosterone, vitamin B and C  Allergies: Reviewed  Exam: Alert and oriented, normal appearance, normal vital signs.  Normocephalic, normal respirations, neuro grossly intact.  Normal sphincter tone, no rectal mass or impaction, small benign prostate that is nontender, normal seminal vesicles  Assessment: Family history of prostate cancer.  PSA may be falsely elevated from prior ejaculation rectal insertion of sex toys and digital rectal exam.  Plan: Recheck PSA in 12-14 days, no ejaculation 5 days prior.  Call with results and decide if he needs 3 [facility] MRI for further evaluation.  See me in December with KUB and for follow-up of his stones.

## 2019-04-17 NOTE — LETTER
RE: Geoffrey Benjamin  [address] [facility] [address]     Dear Colleague,    Thank you for referring your patient, Geoffrey Benjamin, to the [facility] UROLOGY CLINIC [place] at [facility]. Please see a copy of my visit note below.    Geoffrey Benjamin is a very pleasant 73-year-old male with a recent PSA of 2.44.  He has had no difficulty voiding, hematuria or dysuria and has a normal urinalysis today except for significant glucosuria.  He is sexually active and may have had an ejaculation a few days before his PSA was done.  He also had a digital rectal exam immediately before the blood was drawn and this could falsely elevate the number.  In addition he sometimes inserts a toy into the rectum to promote a more intense ejaculation.  He does not have ejaculatory pain or burning or blood.  Other past medical history: Urolithiasis-I last saw him in December.  Coronary artery disease, colonic polyps, depression, diverticulosis, GERD, gout, Graves' disease, hypertension, metabolic syndrome, osteoarthritis, type 2 diabetes, hypothyroid, former smoker.  Surgeries include foot fusion and tendon repair, ESWL, coronary artery bypass, septoplasty, cystoscopy, holmium laser lithotripsy for stones.  Family history: Coronary artery disease, father had prostate cancer  Medications: Artificial tears, regular aspirin, atenolol, atorvastatin enzyme Q10, Allegra, glimepiride, glucosamine/chondroid, hydrochlorothiazide, Synthroid, lisinopril, metformin, minocycline, multivitamin, omeprazole, Sudafed, Zantac, Cialis, Flomax, testosterone, vitamin B and C  Allergies: Reviewed  Exam: Alert and oriented, normal appearance, normal vital signs.  Normocephalic, normal respirations, neuro grossly intact.  Normal sphincter tone, no rectal mass or impaction, small benign prostate that is nontender, normal seminal vesicles  Assessment: Family history of prostate cancer.  PSA  may be falsely elevated from prior ejaculation rectal insertion of sex toys and digital rectal exam.  Plan: Recheck PSA in 12-14 days, no ejaculation 5 days prior.  Call with results and decide if he needs 3 [facility] MRI for further evaluation.  See me in December with KUB and for follow-up of his stones.    Again, thank you for allowing me to participate in the care of your patient.      Sincerely,    Alfonso Sinha MD

## 2019-04-30 DIAGNOSIS — R97.20 ELEVATED PSA: ICD-10-CM

## 2019-04-30 LAB — PSA SERPL-MCNC: 2.5 NG/ML (ref 0–4)

## 2019-04-30 PROCEDURE — 36415 COLL VENOUS BLD VENIPUNCTURE: CPT | Performed by: UROLOGY

## 2019-04-30 PROCEDURE — 84153 ASSAY OF PSA TOTAL: CPT | Performed by: UROLOGY

## 2019-06-21 DIAGNOSIS — Z80.42 FAMILY HISTORY OF MALIGNANT NEOPLASM OF PROSTATE IN FATHER: ICD-10-CM

## 2019-06-21 DIAGNOSIS — R97.20 ELEVATED PROSTATE SPECIFIC ANTIGEN (PSA): Primary | ICD-10-CM

## 2019-07-02 ENCOUNTER — ANCILLARY PROCEDURE (OUTPATIENT)
Dept: MRI IMAGING | Facility: CLINIC | Age: 74
End: 2019-07-02
Attending: UROLOGY
Payer: MEDICARE

## 2019-07-02 DIAGNOSIS — Z80.42 FAMILY HISTORY OF MALIGNANT NEOPLASM OF PROSTATE IN FATHER: ICD-10-CM

## 2019-07-02 DIAGNOSIS — R97.20 ELEVATED PROSTATE SPECIFIC ANTIGEN (PSA): ICD-10-CM

## 2019-07-02 RX ORDER — GADOBUTROL 604.72 MG/ML
7.5 INJECTION INTRAVENOUS ONCE
Status: COMPLETED | OUTPATIENT
Start: 2019-07-02 | End: 2019-07-02

## 2019-07-02 RX ORDER — GADOBUTROL 604.72 MG/ML
7.5 INJECTION INTRAVENOUS ONCE
Status: DISCONTINUED | OUTPATIENT
Start: 2019-07-02 | End: 2019-07-02

## 2019-07-02 RX ADMIN — GADOBUTROL 7.5 ML: 604.72 INJECTION INTRAVENOUS at 19:10

## 2019-07-03 ENCOUNTER — TELEPHONE (OUTPATIENT)
Dept: UROLOGY | Facility: CLINIC | Age: 74
End: 2019-07-03

## 2019-07-03 NOTE — DISCHARGE INSTRUCTIONS
MRI Contrast Discharge Instructions    The IV contrast you received today will pass out of your body in your  urine. This will happen in the next 24 hours. You will not feel this process.  Your urine will not change color.    Drink at least 4 extra glasses of water or juice today (unless your doctor  has restricted your fluids). This reduces the stress on your kidneys.  You may take your regular medicines.    If you are on dialysis: It is best to have dialysis today.    If you have a reaction: Most reactions happen right away. If you have  any new symptoms after leaving the hospital (such as hives or swelling),  call your hospital at the correct number below. Or call your family doctor.  If you have breathing distress or wheezing, call 911.    Special instructions: ***    I have read and understand the above information.    Signature:______________________________________ Date:___________    Staff:__________________________________________ Date:___________     Time:__________    [facility] Departments:    ___[place]: [phone]  ___[facility]: [phone]  ___[facility]: [phone] ___[facility]: [phone]  ___[facility]: [phone]  ___[facility]: [phone]  ___[phone]  ___[facility]: [phone]  ___[phone]

## 2019-07-08 DIAGNOSIS — R97.20 ELEVATED PROSTATE SPECIFIC ANTIGEN (PSA): Primary | ICD-10-CM

## 2019-07-08 RX ORDER — CIPROFLOXACIN 500 MG/1
TABLET, FILM COATED ORAL
Qty: 6 TABLET | Refills: 0 | Status: SHIPPED | OUTPATIENT
Start: 2019-07-08 | End: 2020-10-19

## 2019-08-01 PROCEDURE — 88305 TISSUE EXAM BY PATHOLOGIST: CPT | Performed by: UROLOGY

## 2019-08-01 PROCEDURE — 88305 TISSUE EXAM BY PATHOLOGIST: CPT | Mod: 26 | Performed by: UROLOGY

## 2019-08-02 ENCOUNTER — OFFICE VISIT (OUTPATIENT)
Dept: UROLOGY | Facility: CLINIC | Age: 74
End: 2019-08-02
Payer: MEDICARE

## 2019-08-02 VITALS
WEIGHT: 179.5 LBS | HEART RATE: 70 BPM | DIASTOLIC BLOOD PRESSURE: 68 MMHG | BODY MASS INDEX: 28.85 KG/M2 | SYSTOLIC BLOOD PRESSURE: 108 MMHG | HEIGHT: 66 IN | OXYGEN SATURATION: 98 %

## 2019-08-02 DIAGNOSIS — R93.89 ABNORMAL MRI: ICD-10-CM

## 2019-08-02 DIAGNOSIS — R97.20 ELEVATED PROSTATE SPECIFIC ANTIGEN (PSA): Primary | ICD-10-CM

## 2019-08-02 PROCEDURE — 55700 HC BIOPSY PROSTATE NEEDLE/PUNCH: CPT | Performed by: UROLOGY

## 2019-08-02 PROCEDURE — 76872 US TRANSRECTAL: CPT | Performed by: UROLOGY

## 2019-08-02 ASSESSMENT — MIFFLIN-ST. JEOR: SCORE: 1501.96

## 2019-08-02 NOTE — PATIENT INSTRUCTIONS
[facility]  Transrectal Ultrasound  Post Operative Information    The physician who performed your Transrectal Ultrasound is Dr. Sinha (telephone number [phone]).  Please contact this doctor if you have any problems or questions.  If unable to reach your doctor, please return to the Emergency Department.      Take one antibiotic the evening of the procedure and then as directed on your prescription.    Drink at least 6-8 glasses of fluids for the first 48 hours.    Avoid heavy lifting and strenuous activity for 48 hours.    Avoid sexual intercourse for the first 24 hours.    No aspirin or ibuprofen products (Motrin, Advil, Nuprin, ect.) for one week.  You may take acetaminophen (Tylenol) for pain.    You may notice a small amount of blood on the tissue after a bowel movement.    You may pass blood with clots in your urine following the procedure.  The amount will decrease with time but may be visible for up to two weeks.     You make have blood in your semen for 4 weeks after the procedure.    You may experience mild perineal (groin area) discomfort after the procedure.    Please call you doctor if you have any of the follow symptoms:  Fever  Increase in the amount of blood passed  Severe discomfort or pain

## 2019-08-02 NOTE — NURSING NOTE
"Chief Complaint   Patient presents with     Elevated PSA     Patient here today for TRUS WITH BX       Blood pressure 108/68, pulse 70, height 1.676 m (5' 6\"), weight 81.4 kg (179 lb 8 oz), SpO2 98 %. Body mass index is 28.97 kg/m .    Patient Active Problem List   Diagnosis     Hypothyroidism     Hyperlipidemia     Coronary artery disease     Metabolic syndrome     Postsurgical aortocoronary bypass status     Mixed hyperlipidemia       Allergies   Allergen Reactions     Ezetimibe      Muscle aches     Hmg-Coa-R Inhibitors Nausea and Vomiting     Rosuvastatin Nausea and Vomiting     Simvastatin      Muscle aches       Current Outpatient Medications   Medication Sig Dispense Refill     Artificial Tear Solution (TEARS NATURALE OP) REFRESH As directed       ATENOLOL PO Take 25 mg by mouth daily       ATORVASTATIN CALCIUM PO Take 10 mg by mouth daily       blood glucose monitoring (ONETOUCH VERIO IQ) test strip once daily       ciprofloxacin (CIPRO) 500 MG tablet Take one tablet every 12 hours starting 08/01/2019, (day before procedure) 6 tablet 0     Coenzyme Q10 (CO Q-10) 300 MG CAPS Take 1 capsule by mouth daily       glimepiride (AMARYL) 2 MG tablet Take 2 mg by mouth every morning (before breakfast)       Glucosamine-Chondroit-Vit C-Mn (GLUCOSAMINE CHONDROITIN 1500 COMPLEX) CAPS Take 2 tablets by mouth daily        hydrochlorothiazide (HYDRODIURIL) 25 MG tablet Take 25 mg by mouth daily       Levothyroxine Sodium (SYNTHROID PO) Take 112 mcg by mouth daily       LISINOPRIL PO Take 2.5 mg by mouth daily       metFORMIN (GLUCOPHAGE-XR) 750 MG 24 hr tablet Take 750 mg by mouth 2 times daily       multivitamin, therapeutic with minerals (MULTI-VITAMIN) TABS tablet Take 1 tablet by mouth daily       Omeprazole (PRILOSEC PO) Take 20 mg by mouth every morning       RaNITidine HCl (ZANTAC PO) OTC- occasional after certain foods       tamsulosin (FLOMAX) 0.4 MG capsule TAKE 1 CAPSULE BY MOUTH DAILY FOR 30 MINUTES AFTER " EVENING MEAL. DO NOT TAKE WITHIN 4 HRS OF CIALIS 30 capsule 11     aspirin 325 MG tablet Take 325 mg by mouth daily       fexofenadine (ALLEGRA) 180 MG tablet Take 180 mg by mouth Allergy season       MINOCYCLINE HCL PO Take 100 mg by mouth daily       Pseudoephedrine HCl (SUDAFED PO) Take by mouth as needed for congestion       tadalafil (CIALIS) 20 MG tablet Take 20 mg by mouth as needed for erectile dysfunction       TESTOSTERONE AQUEOUS IM Inject 250 mg into the muscle Every Three Weeks       vitamin  B complex with vitamin C (VITAMIN  B COMPLEX) TABS Take 1 tablet by mouth daily         Social History     Tobacco Use     Smoking status: Former Smoker     Last attempt to quit: 1972     Years since quittin.6     Smokeless tobacco: Former User   Substance Use Topics     Alcohol use: Yes     Comment: Occasion glass of wine- 1-2 x week     Drug use: No       EMILIANO Mai  2019  11:32 AM

## 2019-08-02 NOTE — PROGRESS NOTES
Diagnosis: Family history of prostate cancer, elevated PSA velocity, abnormal MRI of prostate  Procedure: RYAN, transrectal ultrasound of prostate, transrectal biopsies of prostate. 6 biopsies performed  Surgeon: Ernestine  Anesthesia: Local 1% lidocaine  EBL: Less than 2 ml  Findings: No hyper or hypoechoic changes left apex or left mid gland.  Normal digital rectal exam  Biopsies taken from left apex x4, left mid gland x2  Complications: None.  Patient tolerated procedure well  Follow-up: Call with tissue report next Tuesday.  Finish antibiotic prescription.  Restrictions discussed    PREPROCEDURE DIAGNOSIS: elevated PSA velocity    PROCEDURE:  Transrectal ultrasound sizing and transrectal ultrasound guided prostatatic needle biopsy for patient Geoffrey Benjamin who is a 73 year old male.      SURGEON:  Alfonso Sinha M.D.    ANESTHESIA:  5mL of 1% lidocaine periprostatic block bilaterally.        DESCRIPTION OF PROCEDURE:  The procedure, outcome, anesthesia and risks were discussed with the patient.  Informed consent was obtained and signed and a timeout was completed prior to the procedure.  Digital rectal examination was performed with the findings noted above.  Anesthesia was administered as noted above and the transrectal ultrasound probe was inserted, sizing was performed, and the above findings were noted.  The probe was then withdrawn.  Patient tolerated the procedure well.    FINDINGS: As above    PLAN:  Will follow up within the next two weeks to review results.    Alfonso Sinha M.D.  Urology  [facility]  Clinic Phone:  [phone]

## 2019-08-02 NOTE — LETTER
8/2/2019       RE: Geoffrey Benjamin  [address] [facility] [address] [facility] [id number]     Dear Colleague,    Thank you for referring your patient, Geoffrey Benjamin, to the [facility] [place] [facility]. Please see a copy of my visit note below.    Diagnosis: Family history of prostate cancer, elevated PSA velocity, abnormal MRI of prostate  Procedure: RYAN, transrectal ultrasound of prostate, transrectal biopsies of prostate  Surgeon: Ernetsine  Anesthesia: Local  EBL: Less than 2 ml  Findings: No hyper or hypoechoic changes left apex or left mid gland.  Normal digital rectal exam  Biopsies taken from left apex x4, left mid gland x2  Complications: None.  Patient tolerated procedure well  Follow-up: Call with tissue report next Tuesday.  Finish antibiotic prescription.  Restrictions discussed    Again, thank you for allowing me to participate in the care of your patient.      Sincerely,    Alfonso Sinha MD

## 2019-08-05 LAB — COPATH REPORT: NORMAL

## 2019-09-30 ENCOUNTER — HEALTH MAINTENANCE LETTER (OUTPATIENT)
Age: 74
End: 2019-09-30

## 2019-11-19 ENCOUNTER — OFFICE VISIT (OUTPATIENT)
Dept: CARDIOLOGY | Facility: CLINIC | Age: 74
End: 2019-11-19
Attending: INTERNAL MEDICINE
Payer: MEDICARE

## 2019-11-19 VITALS
BODY MASS INDEX: 29.57 KG/M2 | WEIGHT: 184 LBS | HEART RATE: 82 BPM | SYSTOLIC BLOOD PRESSURE: 112 MMHG | HEIGHT: 66 IN | DIASTOLIC BLOOD PRESSURE: 66 MMHG

## 2019-11-19 DIAGNOSIS — I25.10 CORONARY ARTERY DISEASE INVOLVING NATIVE CORONARY ARTERY OF NATIVE HEART WITHOUT ANGINA PECTORIS: Primary | ICD-10-CM

## 2019-11-19 DIAGNOSIS — Z95.1 S/P CABG (CORONARY ARTERY BYPASS GRAFT): ICD-10-CM

## 2019-11-19 DIAGNOSIS — E88.810 METABOLIC SYNDROME: ICD-10-CM

## 2019-11-19 DIAGNOSIS — E78.2 MIXED HYPERLIPIDEMIA: ICD-10-CM

## 2019-11-19 PROCEDURE — 99214 OFFICE O/P EST MOD 30 MIN: CPT | Performed by: INTERNAL MEDICINE

## 2019-11-19 RX ORDER — ATORVASTATIN CALCIUM 20 MG/1
20 TABLET, FILM COATED ORAL DAILY
Qty: 90 TABLET | Refills: 3 | Status: SHIPPED | OUTPATIENT
Start: 2019-11-19 | End: 2020-10-19

## 2019-11-19 RX ORDER — OMEGA-3 FATTY ACIDS/FISH OIL 300-1000MG
1000 CAPSULE ORAL DAILY
COMMUNITY
End: 2020-10-19

## 2019-11-19 ASSESSMENT — MIFFLIN-ST. JEOR: SCORE: 1517.37

## 2019-11-19 NOTE — LETTER
11/19/2019      Kike Ng MD  [employer] [address]  [facility] [address]      RE: Geoffrey Benjamin       Dear Colleague,    I had the pleasure of seeing Geoffrey Benjamin in the [facility].    Service Date: 11/19/2019      PRIMARY CARE PHYSICIAN:  Dr. Kike Ng.      HISTORY OF PRESENT ILLNESS:  I again had the pleasure of seeing your patient, Geoffrey Benjamin (Woody), at [facility] in [place] for evaluation of coronary artery disease.  The patient is status post 4-vessel bypass surgery 11/07/2006 utilizing the left internal mammary artery to the LAD and a diagonal branch artery sequentially as well as a saphenous vein graft to the posterior branch of the right coronary artery and a free radial artery to the obtuse marginal branch artery.  His ejection fraction was well maintained.  The last stress echocardiogram 04/04/2013 was normal.  The patient continues to exercise nearly every day by [place] 30 minutes without angina or shortness of breath.  He does note some heaviness and twinges in his chest while watching television at night but not with exertion.  He denies hospitalizations since I last saw him 1 year ago.  He did have a prostate biopsy in August which was benign.  He feels as though his erectile dysfunction has worsened since that biopsy.  He also had a colonoscopy in the last few months that was normal.  The patient notes some imbalance when turning his head suddenly persisting over the last 3-4 months.  He has not had any falls.  He has significant knee and joint pain for which he uses CBD oil with some benefit.  He feels that this has increased his glucose, however.  His hemoglobin A1c on 09/24/2019 was 9.8%.  The patient is not on insulin therapy but instead using glimepiride as well as metformin.  The patient has been somewhat intolerant of atorvastatin in the past but is now tolerating atorvastatin 10 mg every  day.  His most recent lipid profile on 03/04/2019 included total cholesterol 174, triglycerides 114, HDL 35,  and ratio of 5.  The patient is willing to increase his atorvastatin to see if we can improve his LDL cholesterol towards his goal of less than 70.      PHYSICAL EXAMINATION:  As listed below.      ASSESSMENT:   1.  Juan Benjamin is a delightful 74-year-old male status post 4-vessel bypass surgery on 11/07/2016 due to severe 3-vessel coronary artery disease and an abnormal stress test.  He denies recurrent exertional angina or congestive heart failure.  The patient notes some chest heaviness and twinges in his chest at rest but not with activity.  This would not likely represent angina pectoris.  Given his otherwise lack of exertional symptoms I do not see the point in repeating a stress test especially given the results of the ISCHEMIA trial.  We will continue to monitor and consider stress testing in the future.   2.  Hyperlipidemia.  He has occasional night cramps probably not secondary to his statin.  We will increase his atorvastatin to 20 mg per day and repeat a lipid profile and ALT in 3 months.  Ultimately I would expect increasing his atorvastatin to 40 or 80 mg should he tolerate dose adjustments.  For now the patient has been intolerant to Zetia in the past and PCSK9 inhibitors remain a viable option.      It is my pleasure to assist in the care of Geoffrey Benjamin.  I intend to see him again in 1 year.  He is quite adamant that he does not intend to make other than minimal diet changes for his diabetes.  I am thus asking him to return to Dr. Ng's office for further treatment options for a very high hemoglobin A1c.  All the patient's questions were answered to his satisfaction.      Aislinn Obregon MD        cc:   Kike Ng MD    [facility] [place]    [address]    [place]  [address]         AISLINN OBREGON MD, [facility]             D: 11/19/2019   T:  2019   MT: MARTHA      Name:     AMARIS CHILDRESS   MRN:      [id number]        Account:      [id number]   :      1945           Service Date: 2019      Document: [id number]           Outpatient Encounter Medications as of 2019   Medication Sig Dispense Refill     Artificial Tear Solution (TEARS NATURALE OP) REFRESH As directed       aspirin 325 MG tablet Take 325 mg by mouth daily       ATENOLOL PO Take 25 mg by mouth daily       atorvastatin (LIPITOR) 20 MG tablet Take 1 tablet (20 mg) by mouth daily 90 tablet 3     Coenzyme Q10 (CO Q-10) 300 MG CAPS Take 1 capsule by mouth daily       fexofenadine (ALLEGRA) 180 MG tablet Take 180 mg by mouth Allergy season       glimepiride (AMARYL) 2 MG tablet Take 4 mg by mouth every morning (before breakfast)        Glucosamine-Chondroit-Vit C-Mn (GLUCOSAMINE CHONDROITIN 1500 COMPLEX) CAPS Take 2 tablets by mouth daily        hydrochlorothiazide (HYDRODIURIL) 25 MG tablet Take 25 mg by mouth daily       Levothyroxine Sodium (SYNTHROID PO) Take 112 mcg by mouth daily       LISINOPRIL PO Take 2.5 mg by mouth daily       metFORMIN (GLUCOPHAGE-XR) 750 MG 24 hr tablet Take 750 mg by mouth 2 times daily       MINOCYCLINE HCL PO Take 100 mg by mouth daily       multivitamin, therapeutic with minerals (MULTI-VITAMIN) TABS tablet Take 1 tablet by mouth daily       omega 3 1000 MG CAPS Take 1,000 mg by mouth daily       Omeprazole (PRILOSEC PO) Take 20 mg by mouth every morning       Pseudoephedrine HCl (SUDAFED PO) Take by mouth as needed for congestion       RaNITidine HCl (ZANTAC PO) OTC- occasional after certain foods       tadalafil (CIALIS) 20 MG tablet Take 20 mg by mouth as needed for erectile dysfunction       tamsulosin (FLOMAX) 0.4 MG capsule TAKE 1 CAPSULE BY MOUTH DAILY FOR 30 MINUTES AFTER EVENING MEAL. DO NOT TAKE WITHIN 4 HRS OF CIALIS 30 capsule 11     TESTOSTERONE AQUEOUS IM Inject 250 mg into the muscle Every Three Weeks       vitamin  B  complex with vitamin C (VITAMIN  B COMPLEX) TABS Take 1 tablet by mouth daily       blood glucose monitoring (ONETOUCH VERIO IQ) test strip once daily       ciprofloxacin (CIPRO) 500 MG tablet Take one tablet every 12 hours starting 08/01/2019, (day before procedure) 6 tablet 0     [DISCONTINUED] ATORVASTATIN CALCIUM PO Take 10 mg by mouth daily       No facility-administered encounter medications on file as of 11/19/2019.        Again, thank you for allowing me to participate in the care of your patient.      Sincerely,    Luigi Obregon MD     [facility]

## 2019-11-19 NOTE — PROGRESS NOTES
HPI and Plan:   See dictation:[id number]    Orders Placed This Encounter   Procedures     Lipid Profile     ALT     Follow-Up with Cardiologist       Orders Placed This Encounter   Medications     omega 3 1000 MG CAPS     Sig: Take 1,000 mg by mouth daily     atorvastatin (LIPITOR) 20 MG tablet     Sig: Take 1 tablet (20 mg) by mouth daily     Dispense:  90 tablet     Refill:  3       Medications Discontinued During This Encounter   Medication Reason     ATORVASTATIN CALCIUM PO Reorder         Encounter Diagnoses   Name Primary?     Coronary artery disease involving native coronary artery of native heart without angina pectoris Yes     S/P CABG (coronary artery bypass graft)      Metabolic syndrome      Mixed hyperlipidemia        CURRENT MEDICATIONS:  Current Outpatient Medications   Medication Sig Dispense Refill     Artificial Tear Solution (TEARS NATURALE OP) REFRESH As directed       aspirin 325 MG tablet Take 325 mg by mouth daily       ATENOLOL PO Take 25 mg by mouth daily       atorvastatin (LIPITOR) 20 MG tablet Take 1 tablet (20 mg) by mouth daily 90 tablet 3     Coenzyme Q10 (CO Q-10) 300 MG CAPS Take 1 capsule by mouth daily       fexofenadine (ALLEGRA) 180 MG tablet Take 180 mg by mouth Allergy season       glimepiride (AMARYL) 2 MG tablet Take 4 mg by mouth every morning (before breakfast)        Glucosamine-Chondroit-Vit C-Mn (GLUCOSAMINE CHONDROITIN 1500 COMPLEX) CAPS Take 2 tablets by mouth daily        hydrochlorothiazide (HYDRODIURIL) 25 MG tablet Take 25 mg by mouth daily       Levothyroxine Sodium (SYNTHROID PO) Take 112 mcg by mouth daily       LISINOPRIL PO Take 2.5 mg by mouth daily       metFORMIN (GLUCOPHAGE-XR) 750 MG 24 hr tablet Take 750 mg by mouth 2 times daily       MINOCYCLINE HCL PO Take 100 mg by mouth daily       multivitamin, therapeutic with minerals (MULTI-VITAMIN) TABS tablet Take 1 tablet by mouth daily       omega 3 1000 MG CAPS Take 1,000 mg by mouth daily       Omeprazole  (PRILOSEC PO) Take 20 mg by mouth every morning       Pseudoephedrine HCl (SUDAFED PO) Take by mouth as needed for congestion       RaNITidine HCl (ZANTAC PO) OTC- occasional after certain foods       tadalafil (CIALIS) 20 MG tablet Take 20 mg by mouth as needed for erectile dysfunction       tamsulosin (FLOMAX) 0.4 MG capsule TAKE 1 CAPSULE BY MOUTH DAILY FOR 30 MINUTES AFTER EVENING MEAL. DO NOT TAKE WITHIN 4 HRS OF CIALIS 30 capsule 11     TESTOSTERONE AQUEOUS IM Inject 250 mg into the muscle Every Three Weeks       vitamin  B complex with vitamin C (VITAMIN  B COMPLEX) TABS Take 1 tablet by mouth daily       blood glucose monitoring (ONETOUCH VERIO IQ) test strip once daily       ciprofloxacin (CIPRO) 500 MG tablet Take one tablet every 12 hours starting 08/01/2019, (day before procedure) 6 tablet 0       ALLERGIES     Allergies   Allergen Reactions     Ezetimibe      Muscle aches     Hmg-Coa-R Inhibitors Nausea and Vomiting     Rosuvastatin Nausea and Vomiting     Simvastatin      Muscle aches       PAST MEDICAL HISTORY:  Past Medical History:   Diagnosis Date     Benign neoplasm of colon      Coronary artery disease      Depression 1989    off medication since     Diverticulosis      Former smoker      Gastro-oesophageal reflux disease      Gout      Graves disease      Hyperlipidemia      Hypertension      Hypothyroidism      Impotence of organic origin      Kidney stone      Metabolic syndrome      Osteoarthritis     hands and back     Other and unspecified hyperlipidemia      PONV (postoperative nausea and vomiting)      Postsurgical aortocoronary bypass status      S/P CABG x 4 11/07/2006    LIMA to LAD and a diagonal branch artery sequentially as well as a SVG to the posterior branch of the right coronary artery and a free radial artery to the obtuse marginal branch arter     Type II or unspecified type diabetes mellitus without mention of complication, not stated as uncontrolled      Unspecified  hypothyroidism        PAST SURGICAL HISTORY:  Past Surgical History:   Procedure Laterality Date     ARTHROSCOPY KNEE      BILATERAL     CORONARY ANGIOGRAPHY ADULT ORDER  11/2006     CORONARY ARTERY BYPASS  11/7/2006    LIMA to LAD and diagonal branch arteries sequentially, SVG to PDA of RCA, and free radial artery to OM     CYSTOSCOPY       EXTRACORPOREAL SHOCK WAVE LITHOTRIPSY, CYSTOSCOPY, INSERT STENT URETER(S), COMBINED  8/13/2013    Procedure: COMBINED EXTRACORPOREAL SHOCK WAVE LITHOTRIPSY, CYSTOSCOPY, INSERT STENT URETER(S);  CYSTOSCOPY, LEFT URETERAL STENT PLACEMENT, LEFT EXTRACORPOREAL SHOCK WAVE LITHOTRIPSY (ESWL)        FOOT SURGERY      FUSION & TENDON REPAIR     HERNIA REPAIR       LASER HOLMIUM LITHOTRIPSY URETER(S), INSERT STENT, COMBINED Left 5/8/2018    Procedure: COMBINED CYSTOSCOPY, URETEROSCOPY, LASER HOLMIUM LITHOTRIPSY URETER(S), INSERT STENT;  1.  Video cystoscopy.    2.  Balloon dilation left ureter.    3.  Left ureteroscopy with holmium laser lithotripsy and stone extractions.    4.  Left double-J stent insertion (4.7 Rw[place] x 24 cm).   5.  Exam under anesthesia.;  Surgeon: Alfonso Sinha MD;  Location:  OR     LASER HOLMIUM LITHOTRIPSY URETER(S), INSERT STENT, COMBINED Left 6/5/2018    Procedure: COMBINED CYSTOSCOPY, URETEROSCOPY, LASER HOLMIUM LITHOTRIPSY URETER(S), INSERT STENT;  1. Cystourethroscopy  2. Left flexible ureteroscopy with holmium laser lithotripsy and stone extraction  3. Left retrograde pyelography with interpretation of intraoperative fluoroscopic imaging  4. Left ureteral stent placement;  Surgeon: Romeo Cheng MD;  Location:  OR     SURGICAL HISTORY OF -   1988    Septoplasty at 13     SURGICAL HISTORY OF -   1/2005    Ureteral stent and lithotripsy for kidney stones     SURGICAL HISTORY OF -   8/2013    cataract extraction OD       FAMILY HISTORY:  Family History   Problem Relation Age of Onset     Coronary Artery Disease Mother      Coronary  Artery Disease Brother      Heart Surgery Brother         CABGx3 at 55     Coronary Artery Disease Brother      Heart Surgery Brother         CABG x2 at 64     Prostate Cancer Father      Family History Negative Brother      Family History Negative Brother        SOCIAL HISTORY:  Social History     Socioeconomic History     Marital status:      Spouse name: None     Number of children: None     Years of education: None     Highest education level: None   Occupational History     None   Social Needs     Financial resource strain: None     Food insecurity:     Worry: None     Inability: None     Transportation needs:     Medical: None     Non-medical: None   Tobacco Use     Smoking status: Former Smoker     Last attempt to quit: 1972     Years since quittin.9     Smokeless tobacco: Former User   Substance and Sexual Activity     Alcohol use: Yes     Comment: Occasion glass of wine- 1-2 x week     Drug use: No     Sexual activity: None   Lifestyle     Physical activity:     Days per week: None     Minutes per session: None     Stress: None   Relationships     Social connections:     Talks on phone: None     Gets together: None     Attends [employer] service: None     Active member of club or organization: None     Attends meetings of clubs or organizations: None     Relationship status: None     Intimate partner violence:     Fear of current or ex partner: None     Emotionally abused: None     Physically abused: None     Forced sexual activity: None   Other Topics Concern     Parent/sibling w/ CABG, MI or angioplasty before 65F 55M? Not Asked      Service Not Asked     Blood Transfusions Not Asked     Caffeine Concern No     Comment: coffee: 2 cups a day  Pop: 2-3 12oz bottles a week     Occupational Exposure Not Asked     Hobby Hazards Not Asked     Sleep Concern No     Stress Concern No     Weight Concern No     Special Diet No     Back Care Not Asked     Exercise Yes     Comment: walking 4-5 times  "a week     Bike Helmet Not Asked     Seat Belt Yes     Self-Exams Not Asked   Social History Narrative     None       Review of Systems:  Skin:  Negative       Eyes:  Positive for glasses    ENT:  Negative      Respiratory:  Negative       Cardiovascular:    Positive for;heaviness(not related to activity x 3-4 months. still walks 5 x week)    Gastroenterology: Positive for heartburn;reflux    Genitourinary:  not assessed      Musculoskeletal:  Positive for joint pain;nocturnal cramping    Neurologic:  Positive for (off balance at times with changing positions)    Psychiatric:  Negative      Heme/Lymph/Imm:  Positive for allergies;hay fever    Endocrine:  Positive for thyroid disorder;diabetes      Physical Exam:  Vitals: /66   Pulse 82   Ht 1.676 m (5' 6\")   Wt 83.5 kg (184 lb)   BMI 29.70 kg/m      Constitutional:  cooperative, alert and oriented, well developed, well nourished, in no acute distress        Skin:  warm and dry to the touch, no apparent skin lesions or masses noted          Head:  normocephalic, no masses or lesions        Eyes:  pupils equal and round;conjunctivae and lids unremarkable;sclera white;no xanthalasma;EOMS intact        Lymph:      ENT:  no pallor or cyanosis, dentition good        Neck:  carotid pulses are full and equal bilaterally, JVP normal, no carotid bruit        Respiratory:  normal breath sounds, clear to auscultation, normal A-P diameter, normal symmetry, normal respiratory excursion, no use of accessory muscles;healed median sternotomy scar         Cardiac: regular rhythm;normal S1 and S2;apical impulse not displaced   S4 no presence of murmur            pulses below the femoral arteries are diminished                                      GI:  abdomen soft, non-tender, BS normoactive, no mass, no HSM, no bruits        Extremities and Muscular Skeletal:  no deformities, clubbing, cyanosis, erythema observed;no edema         left radial harvest site is well healed with " excellent hand filling, left venotomy scar is well healed.    Neurological:  no gross motor deficits;affect appropriate        Psych:  Alert and Oriented x 3        CC  Kike Ng MD  [facility]  [address]  [place] [address]

## 2019-11-19 NOTE — LETTER
11/19/2019    Kike Ng MD  [facility] [address]  [facility] [address]    RE: Geoffrey Benjamin       Dear Colleague,    I had the pleasure of seeing Geoffrey Benjamin in the [facility].    HPI and Plan:   See dictation:[id number]    Orders Placed This Encounter   Procedures     Lipid Profile     ALT     Follow-Up with Cardiologist       Orders Placed This Encounter   Medications     omega 3 1000 MG CAPS     Sig: Take 1,000 mg by mouth daily     atorvastatin (LIPITOR) 20 MG tablet     Sig: Take 1 tablet (20 mg) by mouth daily     Dispense:  90 tablet     Refill:  3       Medications Discontinued During This Encounter   Medication Reason     ATORVASTATIN CALCIUM PO Reorder         Encounter Diagnoses   Name Primary?     Coronary artery disease involving native coronary artery of native heart without angina pectoris Yes     S/P CABG (coronary artery bypass graft)      Metabolic syndrome      Mixed hyperlipidemia        CURRENT MEDICATIONS:  Current Outpatient Medications   Medication Sig Dispense Refill     Artificial Tear Solution (TEARS NATURALE OP) REFRESH As directed       aspirin 325 MG tablet Take 325 mg by mouth daily       ATENOLOL PO Take 25 mg by mouth daily       atorvastatin (LIPITOR) 20 MG tablet Take 1 tablet (20 mg) by mouth daily 90 tablet 3     Coenzyme Q10 (CO Q-10) 300 MG CAPS Take 1 capsule by mouth daily       fexofenadine (ALLEGRA) 180 MG tablet Take 180 mg by mouth Allergy season       glimepiride (AMARYL) 2 MG tablet Take 4 mg by mouth every morning (before breakfast)        Glucosamine-Chondroit-Vit C-Mn (GLUCOSAMINE CHONDROITIN 1500 COMPLEX) CAPS Take 2 tablets by mouth daily        hydrochlorothiazide (HYDRODIURIL) 25 MG tablet Take 25 mg by mouth daily       Levothyroxine Sodium (SYNTHROID PO) Take 112 mcg by mouth daily       LISINOPRIL PO Take 2.5 mg by mouth daily       metFORMIN (GLUCOPHAGE-XR) 750 MG 24 hr tablet Take 750 mg by mouth 2 times  daily       MINOCYCLINE HCL PO Take 100 mg by mouth daily       multivitamin, therapeutic with minerals (MULTI-VITAMIN) TABS tablet Take 1 tablet by mouth daily       omega 3 1000 MG CAPS Take 1,000 mg by mouth daily       Omeprazole (PRILOSEC PO) Take 20 mg by mouth every morning       Pseudoephedrine HCl (SUDAFED PO) Take by mouth as needed for congestion       RaNITidine HCl (ZANTAC PO) OTC- occasional after certain foods       tadalafil (CIALIS) 20 MG tablet Take 20 mg by mouth as needed for erectile dysfunction       tamsulosin (FLOMAX) 0.4 MG capsule TAKE 1 CAPSULE BY MOUTH DAILY FOR 30 MINUTES AFTER EVENING MEAL. DO NOT TAKE WITHIN 4 HRS OF CIALIS 30 capsule 11     TESTOSTERONE AQUEOUS IM Inject 250 mg into the muscle Every Three Weeks       vitamin  B complex with vitamin C (VITAMIN  B COMPLEX) TABS Take 1 tablet by mouth daily       blood glucose monitoring (ONETOUCH VERIO IQ) test strip once daily       ciprofloxacin (CIPRO) 500 MG tablet Take one tablet every 12 hours starting 08/01/2019, (day before procedure) 6 tablet 0       ALLERGIES     Allergies   Allergen Reactions     Ezetimibe      Muscle aches     Hmg-Coa-R Inhibitors Nausea and Vomiting     Rosuvastatin Nausea and Vomiting     Simvastatin      Muscle aches       PAST MEDICAL HISTORY:  Past Medical History:   Diagnosis Date     Benign neoplasm of colon      Coronary artery disease      Depression 1989    off medication since     Diverticulosis      Former smoker      Gastro-oesophageal reflux disease      Gout      Graves disease      Hyperlipidemia      Hypertension      Hypothyroidism      Impotence of organic origin      Kidney stone      Metabolic syndrome      Osteoarthritis     hands and back     Other and unspecified hyperlipidemia      PONV (postoperative nausea and vomiting)      Postsurgical aortocoronary bypass status      S/P CABG x 4 11/07/2006    LIMA to LAD and a diagonal branch artery sequentially as well as a SVG to the  posterior branch of the right coronary artery and a free radial artery to the obtuse marginal branch arter     Type II or unspecified type diabetes mellitus without mention of complication, not stated as uncontrolled      Unspecified hypothyroidism        PAST SURGICAL HISTORY:  Past Surgical History:   Procedure Laterality Date     ARTHROSCOPY KNEE      BILATERAL     CORONARY ANGIOGRAPHY ADULT ORDER  11/2006     CORONARY ARTERY BYPASS  11/7/2006    LIMA to LAD and diagonal branch arteries sequentially, SVG to PDA of RCA, and free radial artery to OM     CYSTOSCOPY       EXTRACORPOREAL SHOCK WAVE LITHOTRIPSY, CYSTOSCOPY, INSERT STENT URETER(S), COMBINED  8/13/2013    Procedure: COMBINED EXTRACORPOREAL SHOCK WAVE LITHOTRIPSY, CYSTOSCOPY, INSERT STENT URETER(S);  CYSTOSCOPY, LEFT URETERAL STENT PLACEMENT, LEFT EXTRACORPOREAL SHOCK WAVE LITHOTRIPSY (ESWL)        FOOT SURGERY      FUSION & TENDON REPAIR     HERNIA REPAIR       LASER HOLMIUM LITHOTRIPSY URETER(S), INSERT STENT, COMBINED Left 5/8/2018    Procedure: COMBINED CYSTOSCOPY, URETEROSCOPY, LASER HOLMIUM LITHOTRIPSY URETER(S), INSERT STENT;  1.  Video cystoscopy.    2.  Balloon dilation left ureter.    3.  Left ureteroscopy with holmium laser lithotripsy and stone extractions.    4.  Left double-J stent insertion (4.7 Macedonian x 24 cm).   5.  Exam under anesthesia.;  Surgeon: Alfonso Sinha MD;  Location:  OR     LASER HOLMIUM LITHOTRIPSY URETER(S), INSERT STENT, COMBINED Left 6/5/2018    Procedure: COMBINED CYSTOSCOPY, URETEROSCOPY, LASER HOLMIUM LITHOTRIPSY URETER(S), INSERT STENT;  1. Cystourethroscopy  2. Left flexible ureteroscopy with holmium laser lithotripsy and stone extraction  3. Left retrograde pyelography with interpretation of intraoperative fluoroscopic imaging  4. Left ureteral stent placement;  Surgeon: Romeo Cheng MD;  Location:  OR     SURGICAL HISTORY OF -   1988    Septoplasty at 13     SURGICAL HISTORY OF -   1/2005     Ureteral stent and lithotripsy for kidney stones     SURGICAL HISTORY OF 2013    cataract extraction OD       FAMILY HISTORY:  Family History   Problem Relation Age of Onset     Coronary Artery Disease Mother      Coronary Artery Disease Brother      Heart Surgery Brother         CABGx3 at 55     Coronary Artery Disease Brother      Heart Surgery Brother         CABG x2 at 64     Prostate Cancer Father      Family History Negative Brother      Family History Negative Brother        SOCIAL HISTORY:  Social History     Socioeconomic History     Marital status:      Spouse name: None     Number of children: None     Years of education: None     Highest education level: None   Occupational History     None   Social Needs     Financial resource strain: None     Food insecurity:     Worry: None     Inability: None     Transportation needs:     Medical: None     Non-medical: None   Tobacco Use     Smoking status: Former Smoker     Last attempt to quit: 1972     Years since quittin.9     Smokeless tobacco: Former User   Substance and Sexual Activity     Alcohol use: Yes     Comment: Occasion glass of wine- 1-2 x week     Drug use: No     Sexual activity: None   Lifestyle     Physical activity:     Days per week: None     Minutes per session: None     Stress: None   Relationships     Social connections:     Talks on phone: None     Gets together: None     Attends [employer] service: None     Active member of club or organization: None     Attends meetings of clubs or organizations: None     Relationship status: None     Intimate partner violence:     Fear of current or ex partner: None     Emotionally abused: None     Physically abused: None     Forced sexual activity: None   Other Topics Concern     Parent/sibling w/ CABG, MI or angioplasty before 65F 55M? Not Asked      Service Not Asked     Blood Transfusions Not Asked     Caffeine Concern No     Comment: coffee: 2 cups a day  Pop: 2-3 12oz  "bottles a week     Occupational Exposure Not Asked     Hobby Hazards Not Asked     Sleep Concern No     Stress Concern No     Weight Concern No     Special Diet No     Back Care Not Asked     Exercise Yes     Comment: walking 4-5 times a week     Bike Helmet Not Asked     Seat Belt Yes     Self-Exams Not Asked   Social History Narrative     None       Review of Systems:  Skin:  Negative       Eyes:  Positive for glasses    ENT:  Negative      Respiratory:  Negative       Cardiovascular:    Positive for;heaviness(not related to activity x 3-4 months. still walks 5 x week)    Gastroenterology: Positive for heartburn;reflux    Genitourinary:  not assessed      Musculoskeletal:  Positive for joint pain;nocturnal cramping    Neurologic:  Positive for (off balance at times with changing positions)    Psychiatric:  Negative      Heme/Lymph/Imm:  Positive for allergies;hay fever    Endocrine:  Positive for thyroid disorder;diabetes      Physical Exam:  Vitals: /66   Pulse 82   Ht 1.676 m (5' 6\")   Wt 83.5 kg (184 lb)   BMI 29.70 kg/m       Constitutional:  cooperative, alert and oriented, well developed, well nourished, in no acute distress        Skin:  warm and dry to the touch, no apparent skin lesions or masses noted          Head:  normocephalic, no masses or lesions        Eyes:  pupils equal and round;conjunctivae and lids unremarkable;sclera white;no xanthalasma;EOMS intact        Lymph:      ENT:  no pallor or cyanosis, dentition good        Neck:  carotid pulses are full and equal bilaterally, JVP normal, no carotid bruit        Respiratory:  normal breath sounds, clear to auscultation, normal A-P diameter, normal symmetry, normal respiratory excursion, no use of accessory muscles;healed median sternotomy scar         Cardiac: regular rhythm;normal S1 and S2;apical impulse not displaced   S4 no presence of murmur            pulses below the femoral arteries are diminished                                  "     GI:  abdomen soft, non-tender, BS normoactive, no mass, no HSM, no bruits        Extremities and Muscular Skeletal:  no deformities, clubbing, cyanosis, erythema observed;no edema         left radial harvest site is well healed with excellent hand filling, left venotomy scar is well healed.    Neurological:  no gross motor deficits;affect appropriate        Psych:  Alert and Oriented x 3        CC  Kike Ng MD  [facility] [address] [place], [place] [id number]                Thank you for allowing me to participate in the care of your patient.      Sincerely,     Luigi Obregon MD     [facility]    cc:   Kike Ng MD  [facility] [address] [place], [place] [id number]

## 2019-12-06 DIAGNOSIS — N40.1 BENIGN PROSTATIC HYPERPLASIA WITH WEAK URINARY STREAM: ICD-10-CM

## 2019-12-06 DIAGNOSIS — R39.12 BENIGN PROSTATIC HYPERPLASIA WITH WEAK URINARY STREAM: ICD-10-CM

## 2019-12-06 RX ORDER — TAMSULOSIN HYDROCHLORIDE 0.4 MG/1
CAPSULE ORAL
Qty: 90 CAPSULE | Refills: 2 | Status: SHIPPED | OUTPATIENT
Start: 2019-12-06 | End: 2020-10-19

## 2019-12-15 ENCOUNTER — HEALTH MAINTENANCE LETTER (OUTPATIENT)
Age: 74
End: 2019-12-15

## 2020-02-18 ENCOUNTER — CARE COORDINATION (OUTPATIENT)
Dept: CARDIOLOGY | Facility: CLINIC | Age: 75
End: 2020-02-18

## 2020-02-18 DIAGNOSIS — R60.9 EDEMA: Primary | ICD-10-CM

## 2020-02-18 NOTE — PROGRESS NOTES
Call from patient inquiring about getting an appointment. Left voice mail for call back.       SHAE King February 18, 2020 3:44 PM

## 2020-02-19 NOTE — PROGRESS NOTES
Patient called to advise this RN that over the past two weeks he has noticed some edema bilaterally in his lower extremities. Patient denies any SOB, CP/pressure either at rest or with activity. Patient denied any recent changes to his diet (I.e. increased salt intake). Patient requests to be evaluated d/t new onset of edema. Patient is in agreement to see Dr. Obregon's ANEUDY. RN transferred patient to scheduling to arrange OV with Dr. Obregon's ANEUDY.             11/19/19 OV Dr. Obregon  ASSESSMENT:   1.  Juan Benjamin is a delightful 74-year-old male status post 4-vessel bypass surgery on 11/07/2016 due to severe 3-vessel coronary artery disease and an abnormal stress test.  He denies recurrent exertional angina or congestive heart failure.  The patient notes some chest heaviness and twinges in his chest at rest but not with activity.  This would not likely represent angina pectoris.  Given his otherwise lack of exertional symptoms I do not see the point in repeating a stress test especially given the results of the ISCHEMIA trial.  We will continue to monitor and consider stress testing in the future.   2.  Hyperlipidemia.  He has occasional night cramps probably not secondary to his statin.  We will increase his atorvastatin to 20 mg per day and repeat a lipid profile and ALT in 3 months.  Ultimately I would expect increasing his atorvastatin to 40 or 80 mg should he tolerate dose adjustments.  For now the patient has been intolerant to Zetia in the past and PCSK9 inhibitors remain a viable option.      It is my pleasure to assist in the care of Geoffrey Benjamin.  I intend to see him again in 1 year.  He is quite adamant that he does not intend to make other than minimal diet changes for his diabetes.  I am thus asking him to return to Dr. Ng's office for further treatment options for a very high hemoglobin A1c.  All the patient's questions were answered to his satisfaction.      Luigi Obregon MD

## 2020-02-21 DIAGNOSIS — R39.12 BENIGN PROSTATIC HYPERPLASIA WITH WEAK URINARY STREAM: Primary | ICD-10-CM

## 2020-02-21 DIAGNOSIS — N40.1 BENIGN PROSTATIC HYPERPLASIA WITH WEAK URINARY STREAM: Primary | ICD-10-CM

## 2020-02-24 ENCOUNTER — OFFICE VISIT (OUTPATIENT)
Dept: CARDIOLOGY | Facility: CLINIC | Age: 75
End: 2020-02-24
Payer: MEDICARE

## 2020-02-24 VITALS
WEIGHT: 191.9 LBS | HEART RATE: 80 BPM | SYSTOLIC BLOOD PRESSURE: 122 MMHG | BODY MASS INDEX: 30.97 KG/M2 | DIASTOLIC BLOOD PRESSURE: 68 MMHG

## 2020-02-24 DIAGNOSIS — R60.0 PERIPHERAL EDEMA: Primary | ICD-10-CM

## 2020-02-24 PROCEDURE — 99214 OFFICE O/P EST MOD 30 MIN: CPT | Performed by: NURSE PRACTITIONER

## 2020-02-24 RX ORDER — INSULIN GLARGINE 100 [IU]/ML
20 INJECTION, SOLUTION SUBCUTANEOUS AT BEDTIME
COMMUNITY

## 2020-02-24 NOTE — PROGRESS NOTES
HPI and Plan:   I had the pleasure of seeing Geoffrey Benjamin today at [facility] for evaluation of lower extremity edema. He is a pleasant 74 year old patient of Dr. Obregon.    Mr. Benjamin has a past medical history significant for coronary artery disease status post four-vessel bypass in 2006, hyperlipidemia, diabetes mellitus type 2, hypertension, and hypothyroidism.    The patient presented in 2006 with unstable angina.  Coronary angiogram showed multivessel disease.  He subsequently underwent a four-vessel coronary artery bypass surgery utilizing the LIMA to LAD and diagonal branch artery, saphenous vein graft to posterior branch of the right coronary artery and a free radial artery to the obtuse marginal branch.  His ejection fraction was well maintained.  His last ischemic evaluation in 2013 was normal.     He was seen by Dr. Obregon in November 2019.  His atorvastatin was increased to 20 mg/day with a repeat fasting lipid profile in 3 months.     Today he presents to the cardiology clinic with complaints of lower extremity edema. He denies shortness of breath, orthopnea and PND. He denies chest pain. He tells me his weight remains stable. He tells me his diet has been has contained a significant amount of sodium. He was recently diagnosed with diabetes for which he takes insulin.      Physical Exam  Please see Below     Assessment and Plan  1. Coronary artery disease with a history of 3 vessel coronary artery bypass in 2016. He denies exertional angina. I have asked him to continue his aspirin and statin therapy.      2. Hyperlipidemia. Managed by his primary.    3. Lower extremity edema, bilateral. The patient has trace to 1+ pitting edema. No evidence of abdominal distension. He denies shortness of breath. I recommend he wear compression stockings to help with his mild edema. I also recommended he elevate his feet at rest. I recommend he follow-up in 1 month if his edema does not  improve.     Thank you for allowing me to care for Geoffrey Benjamin today.    LUCILLE Jin, CNP  Cardiology    Voice recognition software was used for this note, I have reviewed this note, but errors may have been missed.    No orders of the defined types were placed in this encounter.    Orders Placed This Encounter   Medications     Bioflavonoids (BIOFLAVONOID-1000 PO)     Sig: Take by mouth daily     HEMP OIL OR EXTRACT OR OTHER CBD CANNABINOID, NOT MEDICAL CANNABIS,     Sig: daily     insulin glargine (BASAGLAR KWIKPEN) 100 UNIT/ML pen     Sig: Inject Subcutaneous At Bedtime 22 Units     If Basaglar is not covered by insurance, may substitute Lantus at same dose and frequency.       Medications Discontinued During This Encounter   Medication Reason     RaNITidine HCl (ZANTAC PO) Stopped by Patient         CURRENT MEDICATIONS:  Current Outpatient Medications   Medication Sig Dispense Refill     Artificial Tear Solution (TEARS NATURALE OP) REFRESH As directed       aspirin 325 MG tablet Take 325 mg by mouth daily       ATENOLOL PO Take 25 mg by mouth daily       atorvastatin (LIPITOR) 20 MG tablet Take 1 tablet (20 mg) by mouth daily (Patient taking differently: Take 10 mg by mouth daily ) 90 tablet 3     Bioflavonoids (BIOFLAVONOID-1000 PO) Take by mouth daily       blood glucose monitoring (ONETOUCH VERIO IQ) test strip once daily       ciprofloxacin (CIPRO) 500 MG tablet Take one tablet every 12 hours starting 08/01/2019, (day before procedure) 6 tablet 0     Coenzyme Q10 (CO Q-10) 300 MG CAPS Take 1 capsule by mouth daily       fexofenadine (ALLEGRA) 180 MG tablet Take 180 mg by mouth Allergy season       glimepiride (AMARYL) 2 MG tablet Take 4 mg by mouth every morning (before breakfast)        Glucosamine-Chondroit-Vit C-Mn (GLUCOSAMINE CHONDROITIN 1500 COMPLEX) CAPS Take 2 tablets by mouth daily        HEMP OIL OR EXTRACT OR OTHER CBD CANNABINOID, NOT MEDICAL CANNABIS, daily       hydrochlorothiazide  (HYDRODIURIL) 25 MG tablet Take 25 mg by mouth daily       insulin glargine (BASAGLAR KWIKPEN) 100 UNIT/ML pen Inject Subcutaneous At Bedtime 22 Units       Levothyroxine Sodium (SYNTHROID PO) Take 112 mcg by mouth daily       LISINOPRIL PO Take 2.5 mg by mouth daily       metFORMIN (GLUCOPHAGE-XR) 750 MG 24 hr tablet Take 750 mg by mouth 2 times daily       MINOCYCLINE HCL PO Take 100 mg by mouth daily       multivitamin, therapeutic with minerals (MULTI-VITAMIN) TABS tablet Take 1 tablet by mouth daily       omega 3 1000 MG CAPS Take 1,000 mg by mouth daily       Omeprazole (PRILOSEC PO) Take 20 mg by mouth every morning       Pseudoephedrine HCl (SUDAFED PO) Take by mouth as needed for congestion       tadalafil (CIALIS) 20 MG tablet Take 20 mg by mouth as needed for erectile dysfunction       tamsulosin (FLOMAX) 0.4 MG capsule TAKE 1 CAPSULE BY MOUTH DAILY FOR 30 MINUTES AFTER EVENING MEAL. DO NOT TAKE WITHIN 4 HRS OF CIALIS 90 capsule 2     TESTOSTERONE AQUEOUS IM Inject 250 mg into the muscle Every Three Weeks       vitamin  B complex with vitamin C (VITAMIN  B COMPLEX) TABS Take 1 tablet by mouth daily         ALLERGIES     Allergies   Allergen Reactions     Ezetimibe      Muscle aches     Hmg-Coa-R Inhibitors Nausea and Vomiting     Rosuvastatin Nausea and Vomiting     Simvastatin      Muscle aches       PAST MEDICAL HISTORY:  Past Medical History:   Diagnosis Date     Benign neoplasm of colon      Bilateral lower extremity edema      Coronary artery disease      Depression 1989    off medication since     Diverticulosis      Former smoker      Gastro-oesophageal reflux disease      Gout      Graves disease      Hyperlipidemia      Hypertension      Hypothyroidism      Impotence of organic origin      Kidney stone      Metabolic syndrome      Osteoarthritis     hands and back     Other and unspecified hyperlipidemia      PONV (postoperative nausea and vomiting)      Postsurgical aortocoronary bypass status       S/P CABG x 4 11/07/2006    LIMA to LAD and a diagonal branch artery sequentially as well as a SVG to the posterior branch of the right coronary artery and a free radial artery to the obtuse marginal branch arter     Type II or unspecified type diabetes mellitus without mention of complication, not stated as uncontrolled      Unspecified hypothyroidism        PAST SURGICAL HISTORY:  Past Surgical History:   Procedure Laterality Date     ARTHROSCOPY KNEE      BILATERAL     CORONARY ANGIOGRAPHY ADULT ORDER  11/2006     CORONARY ARTERY BYPASS  11/7/2006    LIMA to LAD and diagonal branch arteries sequentially, SVG to PDA of RCA, and free radial artery to OM     CYSTOSCOPY       EXTRACORPOREAL SHOCK WAVE LITHOTRIPSY, CYSTOSCOPY, INSERT STENT URETER(S), COMBINED  8/13/2013    Procedure: COMBINED EXTRACORPOREAL SHOCK WAVE LITHOTRIPSY, CYSTOSCOPY, INSERT STENT URETER(S);  CYSTOSCOPY, LEFT URETERAL STENT PLACEMENT, LEFT EXTRACORPOREAL SHOCK WAVE LITHOTRIPSY (ESWL)        FOOT SURGERY      FUSION & TENDON REPAIR     HERNIA REPAIR       LASER HOLMIUM LITHOTRIPSY URETER(S), INSERT STENT, COMBINED Left 5/8/2018    Procedure: COMBINED CYSTOSCOPY, URETEROSCOPY, LASER HOLMIUM LITHOTRIPSY URETER(S), INSERT STENT;  1.  Video cystoscopy.    2.  Balloon dilation left ureter.    3.  Left ureteroscopy with holmium laser lithotripsy and stone extractions.    4.  Left double-J stent insertion (4.7 Hebrew x 24 cm).   5.  Exam under anesthesia.;  Surgeon: Alfonso Sinha MD;  Location: [facility] OR     LASER HOLMIUM LITHOTRIPSY URETER(S), INSERT STENT, COMBINED Left 6/5/2018    Procedure: COMBINED CYSTOSCOPY, URETEROSCOPY, LASER HOLMIUM LITHOTRIPSY URETER(S), INSERT STENT;  1. Cystourethroscopy  2. Left flexible ureteroscopy with holmium laser lithotripsy and stone extraction  3. Left retrograde pyelography with interpretation of intraoperative fluoroscopic imaging  4. Left ureteral stent placement;  Surgeon: Romeo Cheng,  MD;  Location: [facility] OR     SURGICAL HISTORY OF -       Septoplasty at 13     SURGICAL HISTORY OF -   2005    Ureteral stent and lithotripsy for kidney stones     SURGICAL HISTORY OF 2013    cataract extraction OD       FAMILY HISTORY:  Family History   Problem Relation Age of Onset     Coronary Artery Disease Mother      Coronary Artery Disease Brother      Heart Surgery Brother         CABGx3 at 55     Coronary Artery Disease Brother      Heart Surgery Brother         CABG x2 at 64     Prostate Cancer Father      Family History Negative Brother      Family History Negative Brother        SOCIAL HISTORY:  Social History     Socioeconomic History     Marital status:      Spouse name: None     Number of children: None     Years of education: None     Highest education level: None   Occupational History     None   Social Needs     Financial resource strain: None     Food insecurity:     Worry: None     Inability: None     Transportation needs:     Medical: None     Non-medical: None   Tobacco Use     Smoking status: Former Smoker     Years: 7.00     Types: Pipe     Start date:      Last attempt to quit:      Years since quittin.1     Smokeless tobacco: Former User   Substance and Sexual Activity     Alcohol use: Yes     Comment: Occasion glass of wine- 1-2 x week     Drug use: No     Sexual activity: None   Lifestyle     Physical activity:     Days per week: None     Minutes per session: None     Stress: None   Relationships     Social connections:     Talks on phone: None     Gets together: None     Attends Orthodox service: None     Active member of club or organization: None     Attends meetings of clubs or organizations: None     Relationship status: None     Intimate partner violence:     Fear of current or ex partner: None     Emotionally abused: None     Physically abused: None     Forced sexual activity: None   Other Topics Concern     Parent/sibling w/ CABG, MI or angioplasty  before 65F 55M? Not Asked      Service Not Asked     Blood Transfusions Not Asked     Caffeine Concern No     Comment: coffee: 2 cups a day  Pop: 2-3 12oz bottles a week     Occupational Exposure Not Asked     Hobby Hazards Not Asked     Sleep Concern No     Stress Concern No     Weight Concern No     Special Diet No     Back Care Not Asked     Exercise Yes     Comment: walking 4-5 times a week     Bike Helmet Not Asked     Seat Belt Yes     Self-Exams Not Asked   Social History Narrative     None       Review of Systems:  Skin:  Negative       Eyes:  Positive for glasses    ENT:  Positive for tinnitus    Respiratory:  Negative       Cardiovascular:    Positive for;heaviness(not related to activity x 3-4 months. still walks 5 x week) slight  Gastroenterology: Positive for heartburn;reflux treated  Genitourinary:  Negative      Musculoskeletal:  Positive for joint pain;nocturnal cramping better since reducing Atorvastating to 10mg  Neurologic:  Positive for (off balance at times with changing positions)    Psychiatric:  Negative      Heme/Lymph/Imm:  Positive for allergies;hay fever    Endocrine:  Positive for thyroid disorder;diabetes      Physical Exam:  Vitals: /68   Pulse 80   Wt 87 kg (191 lb 14.4 oz)   BMI 30.97 kg/m      Constitutional:  cooperative, alert and oriented, well developed, well nourished, in no acute distress        Skin:  warm and dry to the touch          Head:  normocephalic        Eyes:  pupils equal and round;sclera white        Lymph:      ENT:  no pallor or cyanosis        Neck:  carotid pulses are full and equal bilaterally, JVP normal, no carotid bruit        Respiratory:  normal breath sounds, clear to auscultation, normal A-P diameter, normal symmetry, normal respiratory excursion, no use of accessory muscles;healed median sternotomy scar         Cardiac: regular rhythm;normal S1 and S2;apical impulse not displaced   S4 no presence of murmur                right radial  artery;2+             left radial artery;2+                    GI:  abdomen soft;no masses        Extremities and Muscular Skeletal:  no deformities, clubbing, cyanosis, erythema observed;no edema              Neurological:  no gross motor deficits;affect appropriate        Psych:  Alert and Oriented x 3    No diagnosis found.    Recent Lab Results:  LIPID RESULTS:  Lab Results   Component Value Date    CHOL 160 10/19/2015    HDL 29 10/19/2015     10/19/2015    TRIG 138 10/19/2015       LIVER ENZYME RESULTS:  Lab Results   Component Value Date    AST 18 10/19/2015    ALT 20 10/19/2015       CBC RESULTS:  Lab Results   Component Value Date    WBC 8.9 11/08/2006    RBC 3.75 (L) 11/08/2006    HGB 15.8 10/19/2015    HCT 32.7 (L) 11/08/2006    MCV 87 11/08/2006    MCH 29.8 11/08/2006    MCHC 34.1 11/08/2006    RDW 14.5 11/08/2006     (L) 11/08/2006       BMP RESULTS:  Lab Results   Component Value Date     10/19/2015    POTASSIUM 4.7 05/08/2018    CHLORIDE 105 10/19/2015    CO2 29 11/08/2006    ANIONGAP 8 10/19/2015     (A) 10/19/2015    BUN 14 10/19/2015    CR 0.78 05/08/2018    GFRESTIMATED >90 05/08/2018    GFRESTBLACK >90 05/08/2018    RULA 9.4 10/19/2015        A1C RESULTS:  Lab Results   Component Value Date    A1C 6.7 (H) 11/06/2006       INR RESULTS:  Lab Results   Component Value Date    INR 1.42 (H) 11/07/2006    INR 0.87 11/06/2006           CC  No referring provider defined for this encounter.

## 2020-02-24 NOTE — LETTER
2/24/2020    Kike Ng MD  [address]  [facility] [address]    RE: Geoffrey Benjamin       Dear Colleague,    I had the pleasure of seeing Geoffrey Benjamin in the [facility].    HPI and Plan:   I had the pleasure of seeing Geoffrey Benjamin today at [facility] for evaluation of lower extremity edema. He is a pleasant 74 year old patient of Dr. Obregon.    Mr. Benjamin has a past medical history significant for coronary artery disease status post four-vessel bypass in 2006, hyperlipidemia, diabetes mellitus type 2, hypertension, and hypothyroidism.    The patient presented in 2006 with unstable angina.  Coronary angiogram showed multivessel disease.  He subsequently underwent a four-vessel coronary artery bypass surgery utilizing the LIMA to LAD and diagonal branch artery, saphenous vein graft to posterior branch of the right coronary artery and a free radial artery to the obtuse marginal branch.  His ejection fraction was well maintained.  His last ischemic evaluation in 2013 was normal.     He was seen by Dr. Obregon in November 2019.  His atorvastatin was increased to 20 mg/day with a repeat fasting lipid profile in 3 months.     Today he presents to the cardiology clinic with complaints of lower extremity edema. He denies shortness of breath, orthopnea and PND. He denies chest pain. He tells me his weight remains stable. He tells me his diet has been has contained a significant amount of sodium. He was recently diagnosed with diabetes for which he takes insulin.      Physical Exam  Please see Below     Assessment and Plan  1. Coronary artery disease with a history of 3 vessel coronary artery bypass in 2016. He denies exertional angina. I have asked him to continue his aspirin and statin therapy.      2. Hyperlipidemia. Managed by his primary.    3. Lower extremity edema, bilateral. The patient has trace to 1+ pitting edema. No  evidence of abdominal distension. He denies shortness of breath. I recommend he wear compression stockings to help with his mild edema. I also recommended he elevate his feet at rest. I recommend he follow-up in 1 month if his edema does not improve.     Thank you for allowing me to care for Geoffrey Benjamin today.    LUCILLE Jin, CNP  Cardiology    Voice recognition software was used for this note, I have reviewed this note, but errors may have been missed.    No orders of the defined types were placed in this encounter.    Orders Placed This Encounter   Medications     Bioflavonoids (BIOFLAVONOID-1000 PO)     Sig: Take by mouth daily     HEMP OIL OR EXTRACT OR OTHER CBD CANNABINOID, NOT MEDICAL CANNABIS,     Sig: daily     insulin glargine (BASAGLAR KWIKPEN) 100 UNIT/ML pen     Sig: Inject Subcutaneous At Bedtime 22 Units     If Basaglar is not covered by insurance, may substitute Lantus at same dose and frequency.       Medications Discontinued During This Encounter   Medication Reason     RaNITidine HCl (ZANTAC PO) Stopped by Patient         CURRENT MEDICATIONS:  Current Outpatient Medications   Medication Sig Dispense Refill     Artificial Tear Solution (TEARS NATURALE OP) REFRESH As directed       aspirin 325 MG tablet Take 325 mg by mouth daily       ATENOLOL PO Take 25 mg by mouth daily       atorvastatin (LIPITOR) 20 MG tablet Take 1 tablet (20 mg) by mouth daily (Patient taking differently: Take 10 mg by mouth daily ) 90 tablet 3     Bioflavonoids (BIOFLAVONOID-1000 PO) Take by mouth daily       blood glucose monitoring (ONETOUCH VERIO IQ) test strip once daily       ciprofloxacin (CIPRO) 500 MG tablet Take one tablet every 12 hours starting 08/01/2019, (day before procedure) 6 tablet 0     Coenzyme Q10 (CO Q-10) 300 MG CAPS Take 1 capsule by mouth daily       fexofenadine (ALLEGRA) 180 MG tablet Take 180 mg by mouth Allergy season       glimepiride (AMARYL) 2 MG tablet Take 4 mg by mouth every  morning (before breakfast)        Glucosamine-Chondroit-Vit C-Mn (GLUCOSAMINE CHONDROITIN 1500 COMPLEX) CAPS Take 2 tablets by mouth daily        HEMP OIL OR EXTRACT OR OTHER CBD CANNABINOID, NOT MEDICAL CANNABIS, daily       hydrochlorothiazide (HYDRODIURIL) 25 MG tablet Take 25 mg by mouth daily       insulin glargine (BASAGLAR KWIKPEN) 100 UNIT/ML pen Inject Subcutaneous At Bedtime 22 Units       Levothyroxine Sodium (SYNTHROID PO) Take 112 mcg by mouth daily       LISINOPRIL PO Take 2.5 mg by mouth daily       metFORMIN (GLUCOPHAGE-XR) 750 MG 24 hr tablet Take 750 mg by mouth 2 times daily       MINOCYCLINE HCL PO Take 100 mg by mouth daily       multivitamin, therapeutic with minerals (MULTI-VITAMIN) TABS tablet Take 1 tablet by mouth daily       omega 3 1000 MG CAPS Take 1,000 mg by mouth daily       Omeprazole (PRILOSEC PO) Take 20 mg by mouth every morning       Pseudoephedrine HCl (SUDAFED PO) Take by mouth as needed for congestion       tadalafil (CIALIS) 20 MG tablet Take 20 mg by mouth as needed for erectile dysfunction       tamsulosin (FLOMAX) 0.4 MG capsule TAKE 1 CAPSULE BY MOUTH DAILY FOR 30 MINUTES AFTER EVENING MEAL. DO NOT TAKE WITHIN 4 HRS OF CIALIS 90 capsule 2     TESTOSTERONE AQUEOUS IM Inject 250 mg into the muscle Every Three Weeks       vitamin  B complex with vitamin C (VITAMIN  B COMPLEX) TABS Take 1 tablet by mouth daily         ALLERGIES     Allergies   Allergen Reactions     Ezetimibe      Muscle aches     Hmg-Coa-R Inhibitors Nausea and Vomiting     Rosuvastatin Nausea and Vomiting     Simvastatin      Muscle aches       PAST MEDICAL HISTORY:  Past Medical History:   Diagnosis Date     Benign neoplasm of colon      Bilateral lower extremity edema      Coronary artery disease      Depression 1989    off medication since     Diverticulosis      Former smoker      Gastro-oesophageal reflux disease      Gout      Graves disease      Hyperlipidemia      Hypertension      Hypothyroidism       Impotence of organic origin      Kidney stone      Metabolic syndrome      Osteoarthritis     hands and back     Other and unspecified hyperlipidemia      PONV (postoperative nausea and vomiting)      Postsurgical aortocoronary bypass status      S/P CABG x 4 11/07/2006    LIMA to LAD and a diagonal branch artery sequentially as well as a SVG to the posterior branch of the right coronary artery and a free radial artery to the obtuse marginal branch arter     Type II or unspecified type diabetes mellitus without mention of complication, not stated as uncontrolled      Unspecified hypothyroidism        PAST SURGICAL HISTORY:  Past Surgical History:   Procedure Laterality Date     ARTHROSCOPY KNEE      BILATERAL     CORONARY ANGIOGRAPHY ADULT ORDER  11/2006     CORONARY ARTERY BYPASS  11/7/2006    LIMA to LAD and diagonal branch arteries sequentially, SVG to PDA of RCA, and free radial artery to OM     CYSTOSCOPY       EXTRACORPOREAL SHOCK WAVE LITHOTRIPSY, CYSTOSCOPY, INSERT STENT URETER(S), COMBINED  8/13/2013    Procedure: COMBINED EXTRACORPOREAL SHOCK WAVE LITHOTRIPSY, CYSTOSCOPY, INSERT STENT URETER(S);  CYSTOSCOPY, LEFT URETERAL STENT PLACEMENT, LEFT EXTRACORPOREAL SHOCK WAVE LITHOTRIPSY (ESWL)        FOOT SURGERY      FUSION & TENDON REPAIR     HERNIA REPAIR       LASER HOLMIUM LITHOTRIPSY URETER(S), INSERT STENT, COMBINED Left 5/8/2018    Procedure: COMBINED CYSTOSCOPY, URETEROSCOPY, LASER HOLMIUM LITHOTRIPSY URETER(S), INSERT STENT;  1.  Video cystoscopy.    2.  Balloon dilation left ureter.    3.  Left ureteroscopy with holmium laser lithotripsy and stone extractions.    4.  Left double-J stent insertion (4.7 German x 24 cm).   5.  Exam under anesthesia.;  Surgeon: Alfonso Sinha MD;  Location: [facility] OR     LASER HOLMIUM LITHOTRIPSY URETER(S), INSERT STENT, COMBINED Left 6/5/2018    Procedure: COMBINED CYSTOSCOPY, URETEROSCOPY, LASER HOLMIUM LITHOTRIPSY URETER(S), INSERT STENT;  1. Cystourethroscopy  2.  Left flexible ureteroscopy with holmium laser lithotripsy and stone extraction  3. Left retrograde pyelography with interpretation of intraoperative fluoroscopic imaging  4. Left ureteral stent placement;  Surgeon: Romeo Cheng MD;  Location: [facility] OR     SURGICAL HISTORY OF -       Septoplasty at 13     SURGICAL HISTORY OF -   2005    Ureteral stent and lithotripsy for kidney stones     SURGICAL HISTORY OF -   2013    cataract extraction OD       FAMILY HISTORY:  Family History   Problem Relation Age of Onset     Coronary Artery Disease Mother      Coronary Artery Disease Brother      Heart Surgery Brother         CABGx3 at 55     Coronary Artery Disease Brother      Heart Surgery Brother         CABG x2 at 64     Prostate Cancer Father      Family History Negative Brother      Family History Negative Brother        SOCIAL HISTORY:  Social History     Socioeconomic History     Marital status:      Spouse name: None     Number of children: None     Years of education: None     Highest education level: None   Occupational History     None   Social Needs     Financial resource strain: None     Food insecurity:     Worry: None     Inability: None     Transportation needs:     Medical: None     Non-medical: None   Tobacco Use     Smoking status: Former Smoker     Years: 7.00     Types: Pipe     Start date:      Last attempt to quit:      Years since quittin.1     Smokeless tobacco: Former User   Substance and Sexual Activity     Alcohol use: Yes     Comment: Occasion glass of wine- 1-2 x week     Drug use: No     Sexual activity: None   Lifestyle     Physical activity:     Days per week: None     Minutes per session: None     Stress: None   Relationships     Social connections:     Talks on phone: None     Gets together: None     Attends Hinduism service: None     Active member of club or organization: None     Attends meetings of clubs or organizations: None     Relationship  status: None     Intimate partner violence:     Fear of current or ex partner: None     Emotionally abused: None     Physically abused: None     Forced sexual activity: None   Other Topics Concern     Parent/sibling w/ CABG, MI or angioplasty before 65F 55M? Not Asked      Service Not Asked     Blood Transfusions Not Asked     Caffeine Concern No     Comment: coffee: 2 cups a day  Pop: 2-3 12oz bottles a week     Occupational Exposure Not Asked     Hobby Hazards Not Asked     Sleep Concern No     Stress Concern No     Weight Concern No     Special Diet No     Back Care Not Asked     Exercise Yes     Comment: walking 4-5 times a week     Bike Helmet Not Asked     Seat Belt Yes     Self-Exams Not Asked   Social History Narrative     None       Review of Systems:  Skin:  Negative       Eyes:  Positive for glasses    ENT:  Positive for tinnitus    Respiratory:  Negative       Cardiovascular:    Positive for;heaviness(not related to activity x 3-4 months. still walks 5 x week) slight  Gastroenterology: Positive for heartburn;reflux treated  Genitourinary:  Negative      Musculoskeletal:  Positive for joint pain;nocturnal cramping better since reducing Atorvastating to 10mg  Neurologic:  Positive for (off balance at times with changing positions)    Psychiatric:  Negative      Heme/Lymph/Imm:  Positive for allergies;hay fever    Endocrine:  Positive for thyroid disorder;diabetes      Physical Exam:  Vitals: /68   Pulse 80   Wt 87 kg (191 lb 14.4 oz)   BMI 30.97 kg/m       Constitutional:  cooperative, alert and oriented, well developed, well nourished, in no acute distress        Skin:  warm and dry to the touch          Head:  normocephalic        Eyes:  pupils equal and round;sclera white        Lymph:      ENT:  no pallor or cyanosis        Neck:  carotid pulses are full and equal bilaterally, JVP normal, no carotid bruit        Respiratory:  normal breath sounds, clear to auscultation, normal A-P  diameter, normal symmetry, normal respiratory excursion, no use of accessory muscles;healed median sternotomy scar         Cardiac: regular rhythm;normal S1 and S2;apical impulse not displaced   S4 no presence of murmur                right radial artery;2+             left radial artery;2+                    GI:  abdomen soft;no masses        Extremities and Muscular Skeletal:  no deformities, clubbing, cyanosis, erythema observed;no edema              Neurological:  no gross motor deficits;affect appropriate        Psych:  Alert and Oriented x 3    No diagnosis found.    Recent Lab Results:  LIPID RESULTS:  Lab Results   Component Value Date    CHOL 160 10/19/2015    HDL 29 10/19/2015     10/19/2015    TRIG 138 10/19/2015       LIVER ENZYME RESULTS:  Lab Results   Component Value Date    AST 18 10/19/2015    ALT 20 10/19/2015       CBC RESULTS:  Lab Results   Component Value Date    WBC 8.9 11/08/2006    RBC 3.75 (L) 11/08/2006    HGB 15.8 10/19/2015    HCT 32.7 (L) 11/08/2006    MCV 87 11/08/2006    MCH 29.8 11/08/2006    MCHC 34.1 11/08/2006    RDW 14.5 11/08/2006     (L) 11/08/2006       BMP RESULTS:  Lab Results   Component Value Date     10/19/2015    POTASSIUM 4.7 05/08/2018    CHLORIDE 105 10/19/2015    CO2 29 11/08/2006    ANIONGAP 8 10/19/2015     (A) 10/19/2015    BUN 14 10/19/2015    CR 0.78 05/08/2018    GFRESTIMATED >90 05/08/2018    GFRESTBLACK >90 05/08/2018    RULA 9.4 10/19/2015        A1C RESULTS:  Lab Results   Component Value Date    A1C 6.7 (H) 11/06/2006       INR RESULTS:  Lab Results   Component Value Date    INR 1.42 (H) 11/07/2006    INR 0.87 11/06/2006           CC  No referring provider defined for this encounter.                  Thank you for allowing me to participate in the care of your patient.    Sincerely,     LUCILLE Jin [facility]

## 2020-05-12 ENCOUNTER — TELEPHONE (OUTPATIENT)
Dept: UROLOGY | Facility: CLINIC | Age: 75
End: 2020-05-12

## 2020-05-12 DIAGNOSIS — N40.0 BPH (BENIGN PROSTATIC HYPERPLASIA): Primary | ICD-10-CM

## 2020-05-12 NOTE — TELEPHONE ENCOUNTER
Left patient detailed message of pre screening of Corona virus and to reschedule if having symptoms. Also to arrive on time for appointment and to come inside clinic by himself and to wear a mask if possible.  Marly Bowden LPN

## 2020-05-13 ENCOUNTER — OFFICE VISIT (OUTPATIENT)
Dept: UROLOGY | Facility: CLINIC | Age: 75
End: 2020-05-13
Payer: MEDICARE

## 2020-05-13 VITALS
SYSTOLIC BLOOD PRESSURE: 102 MMHG | OXYGEN SATURATION: 98 % | HEIGHT: 66 IN | WEIGHT: 180 LBS | DIASTOLIC BLOOD PRESSURE: 62 MMHG | HEART RATE: 81 BPM | BODY MASS INDEX: 28.93 KG/M2

## 2020-05-13 DIAGNOSIS — N40.1 BENIGN PROSTATIC HYPERPLASIA WITH WEAK URINARY STREAM: ICD-10-CM

## 2020-05-13 DIAGNOSIS — R39.12 BENIGN PROSTATIC HYPERPLASIA WITH WEAK URINARY STREAM: ICD-10-CM

## 2020-05-13 LAB
ALBUMIN UR-MCNC: NEGATIVE MG/DL
APPEARANCE UR: CLEAR
BILIRUB UR QL STRIP: NEGATIVE
COLOR UR AUTO: YELLOW
GLUCOSE UR STRIP-MCNC: 500 MG/DL
HGB UR QL STRIP: NEGATIVE
KETONES UR STRIP-MCNC: NEGATIVE MG/DL
LEUKOCYTE ESTERASE UR QL STRIP: NEGATIVE
NITRATE UR QL: NEGATIVE
PH UR STRIP: 5 PH (ref 5–7)
PSA SERPL-MCNC: 1.3 NG/ML (ref 0–4)
SOURCE: ABNORMAL
SP GR UR STRIP: 1.02 (ref 1–1.03)
UROBILINOGEN UR STRIP-ACNC: 0.2 EU/DL (ref 0.2–1)

## 2020-05-13 PROCEDURE — 84153 ASSAY OF PSA TOTAL: CPT | Performed by: UROLOGY

## 2020-05-13 PROCEDURE — 99212 OFFICE O/P EST SF 10 MIN: CPT | Performed by: UROLOGY

## 2020-05-13 PROCEDURE — 81003 URINALYSIS AUTO W/O SCOPE: CPT | Performed by: UROLOGY

## 2020-05-13 PROCEDURE — 36415 COLL VENOUS BLD VENIPUNCTURE: CPT | Performed by: UROLOGY

## 2020-05-13 ASSESSMENT — MIFFLIN-ST. JEOR: SCORE: 1499.22

## 2020-05-13 ASSESSMENT — PAIN SCALES - GENERAL: PAINLEVEL: MILD PAIN (3)

## 2020-05-13 NOTE — LETTER
5/13/2020       RE: Geoffrey Benjamin  [address] [facility] [id number]     Dear Colleague,    Thank you for referring your patient, Geoffrey Benjamin, to the [facility] [place] [facility]. Please see a copy of my visit note below.    Mr. Benjamin is a 74-year-old male with a father who had prostate cancer.  Mr. Benjamin had an MRI scan and biopsies of the left apex and left mid gland that were benign.  His PSA was 2.5.  Today's PSA is 1.30.  He has had no flank pain, hematuria dysuria and has a good urinary stream.  He has no new concerns  Other past medical history: Hypothyroidism, hyperlipidemia, coronary artery disease, metabolic syndrome, open heart surgery  Medications: Lipitor, regular aspirin, atenolol, coenzyme every 10, Allegra, glimepiride, glucosamine/chondroitin, hydrochlorothiazide, insulin, Synthroid, lisinopril, metformin, minocycline, multivitamin, fish oil, Prilosec, Sudafed, Cialis, IM testosterone, vitamin B, vitamin C  Allergies: None.  Several medication intolerances  Review of systems: As above  Exam: Alert and oriented, normal appearance, normal vital signs.  Normal respirations, neuro grossly intact.  Normal sphincter tone, no rectal mass or impaction, benign and symmetric prostate, normal seminal vesicles  Urinalysis: Normal except for glucosuria  Assessment: BPH, family history of prostate cancer  Plan: See yearly for urinalysis, PSA and digital rectal exam-he will see Dr. Lora in the future    Again, thank you for allowing me to participate in the care of your patient.      Sincerely,    Alfonso Sinha MD

## 2020-05-13 NOTE — PROGRESS NOTES
Mr. Benjamin is a 74-year-old male with a father who had prostate cancer.  Mr. Benjamin had an MRI scan and biopsies of the left apex and left mid gland that were benign.  His PSA was 2.5.  Today's PSA is 1.30.  He has had no flank pain, hematuria dysuria and has a good urinary stream.  He has no new concerns  Other past medical history: Hypothyroidism, hyperlipidemia, coronary artery disease, metabolic syndrome, open heart surgery  Medications: Lipitor, regular aspirin, atenolol, coenzyme every 10, Allegra, glimepiride, glucosamine/chondroitin, hydrochlorothiazide, insulin, Synthroid, lisinopril, metformin, minocycline, multivitamin, fish oil, Prilosec, Sudafed, Cialis, IM testosterone, vitamin B, vitamin C  Allergies: None.  Several medication intolerances  Review of systems: As above  Exam: Alert and oriented, normal appearance, normal vital signs.  Normal respirations, neuro grossly intact.  Normal sphincter tone, no rectal mass or impaction, benign and symmetric prostate, normal seminal vesicles  Urinalysis: Normal except for glucosuria  Assessment: BPH, family history of prostate cancer  Plan: See yearly for urinalysis, PSA and digital rectal exam-he will see Dr. Lora in the future

## 2020-05-13 NOTE — NURSING NOTE
Chief Complaint   Patient presents with     Clinic Care Coordination - Follow-up     PSA, annual   Marly Bowden LPN

## 2020-09-03 DIAGNOSIS — Z11.59 ENCOUNTER FOR SCREENING FOR OTHER VIRAL DISEASES: Primary | ICD-10-CM

## 2020-09-10 DIAGNOSIS — N40.0 BPH (BENIGN PROSTATIC HYPERPLASIA): Primary | ICD-10-CM

## 2020-09-10 RX ORDER — TAMSULOSIN HYDROCHLORIDE 0.4 MG/1
0.4 CAPSULE ORAL DAILY
Qty: 90 CAPSULE | Refills: 2 | Status: SHIPPED | OUTPATIENT
Start: 2020-09-10 | End: 2021-06-25

## 2020-09-25 ENCOUNTER — DOCUMENTATION ONLY (OUTPATIENT)
Dept: EDUCATION SERVICES | Facility: CLINIC | Age: 75
End: 2020-09-25

## 2020-10-09 ENCOUNTER — DOCUMENTATION ONLY (OUTPATIENT)
Dept: EDUCATION SERVICES | Facility: CLINIC | Age: 75
End: 2020-10-09

## 2020-10-17 DIAGNOSIS — Z11.59 ENCOUNTER FOR SCREENING FOR OTHER VIRAL DISEASES: ICD-10-CM

## 2020-10-17 PROCEDURE — U0003 INFECTIOUS AGENT DETECTION BY NUCLEIC ACID (DNA OR RNA); SEVERE ACUTE RESPIRATORY SYNDROME CORONAVIRUS 2 (SARS-COV-2) (CORONAVIRUS DISEASE [COVID-19]), AMPLIFIED PROBE TECHNIQUE, MAKING USE OF HIGH THROUGHPUT TECHNOLOGIES AS DESCRIBED BY CMS-2020-01-R: HCPCS | Performed by: FAMILY MEDICINE

## 2020-10-18 LAB
SARS-COV-2 RNA SPEC QL NAA+PROBE: NOT DETECTED
SPECIMEN SOURCE: NORMAL

## 2020-10-19 RX ORDER — PSEUDOEPHEDRINE HCL 120 MG/1
120 TABLET, FILM COATED, EXTENDED RELEASE ORAL EVERY EVENING
COMMUNITY

## 2020-10-19 RX ORDER — ATORVASTATIN CALCIUM 10 MG/1
10 TABLET, FILM COATED ORAL AT BEDTIME
COMMUNITY

## 2020-10-19 NOTE — PROGRESS NOTES
PTA medications updated by Medication Scribe prior to surgery via phone call with patient      -LAST DOSES ENTERED BY NURSE-    Comments:    Medication history sources: Patient, Surescripts, H&P and Patient's home med list  Medication history source reliability: Good  Adherence assessment: N/A Not Observed    Significant changes made to the medication list:  None      Additional medication history information:   None        Prior to Admission medications    Medication Sig Last Dose Taking? Auth Provider   aspirin 325 MG tablet Take 325 mg by mouth every evening   at PM Yes Reported, Patient   atenolol (TENORMIN) 25 MG tablet Take 25 mg by mouth daily   at AM Yes Reported, Patient   atorvastatin (LIPITOR) 10 MG tablet Take 10 mg by mouth At Bedtime  at HS Yes Reported, Patient   Carboxymethylcellul-Glycerin (REFRESH OPTIVE OP) Apply 1 drop to eye 2 times daily  Yes Reported, Patient   empagliflozin (JARDIANCE) 10 MG TABS tablet Take 10 mg by mouth daily  at AM Yes Reported, Patient   glimepiride (AMARYL) 4 MG tablet Take 4 mg by mouth every morning (before breakfast)   at AM Yes Reported, Patient   Glucosamine-Chondroit-Vit C-Mn (GLUCOSAMINE CHONDROITIN 1500 COMPLEX) CAPS Take 1 tablet by mouth every morning   at AM Yes Reported, Patient   hydrochlorothiazide (HYDRODIURIL) 25 MG tablet Take 25 mg by mouth every evening   at PM Yes Reported, Patient   insulin glargine (BASAGLAR KWIKPEN) 100 UNIT/ML pen Inject 20 Units Subcutaneous At Bedtime   at HS Yes Reported, Patient   levothyroxine (SYNTHROID) 112 MCG tablet Take 112 mcg by mouth daily   at AM Yes Reported, Patient   lisinopril (ZESTRIL) 2.5 MG tablet Take 2.5 mg by mouth daily   at AM Yes Reported, Patient   metFORMIN (GLUCOPHAGE) 500 MG tablet Take 1,500 mg by mouth every evening (takes 3 x 500mg)  at PM Yes Reported, Patient   minocycline (DYNACIN) 100 MG tablet Take 100 mg by mouth daily   at AM Yes Reported, Patient   multivitamin, therapeutic with minerals  (MULTI-VITAMIN) TABS tablet Take 1 tablet by mouth daily  at AM Yes Reported, Patient   Omeprazole (PRILOSEC PO) Take 20 mg by mouth every morning  at AM Yes Reported, Patient   pseudoePHEDrine (SUDAFED) 120 MG 12 hr tablet Take 120 mg by mouth every evening  at PM Yes Reported, Patient   tadalafil (CIALIS) 20 MG tablet Take 20 mg by mouth daily as needed  10/18/2020 at PRN Yes Reported, Patient   tamsulosin (FLOMAX) 0.4 MG capsule Take 1 capsule (0.4 mg) by mouth daily  Patient taking differently: Take 0.4 mg by mouth every evening   at PM Yes Alfonso Sinha MD   TESTOSTERONE AQUEOUS IM Inject 250 mg into the muscle every 21 days  2 WEEKS AGO Yes Reported, Patient   vitamin  B complex with vitamin C (VITAMIN  B COMPLEX) TABS Take 1 tablet by mouth daily  at AM Yes Reported, Patient

## 2020-10-20 ENCOUNTER — ANESTHESIA EVENT (OUTPATIENT)
Dept: SURGERY | Facility: CLINIC | Age: 75
End: 2020-10-20
Payer: MEDICARE

## 2020-10-20 ENCOUNTER — APPOINTMENT (OUTPATIENT)
Dept: GENERAL RADIOLOGY | Facility: CLINIC | Age: 75
End: 2020-10-20
Attending: ORTHOPAEDIC SURGERY
Payer: MEDICARE

## 2020-10-20 ENCOUNTER — HOSPITAL ENCOUNTER (OUTPATIENT)
Facility: CLINIC | Age: 75
Discharge: HOME | End: 2020-10-21
Attending: ORTHOPAEDIC SURGERY | Admitting: ORTHOPAEDIC SURGERY
Payer: MEDICARE

## 2020-10-20 ENCOUNTER — ANESTHESIA (OUTPATIENT)
Dept: SURGERY | Facility: CLINIC | Age: 75
End: 2020-10-20
Payer: MEDICARE

## 2020-10-20 DIAGNOSIS — Z96.651 S/P TOTAL KNEE ARTHROPLASTY, RIGHT: Primary | ICD-10-CM

## 2020-10-20 LAB
CREAT SERPL-MCNC: 1.01 MG/DL (ref 0.66–1.25)
GFR SERPL CREATININE-BSD FRML MDRD: 72 ML/MIN/{1.73_M2}
GLUCOSE BLDC GLUCOMTR-MCNC: 110 MG/DL (ref 70–99)
GLUCOSE BLDC GLUCOMTR-MCNC: 121 MG/DL (ref 70–99)
GLUCOSE BLDC GLUCOMTR-MCNC: 192 MG/DL (ref 70–99)
GLUCOSE BLDC GLUCOMTR-MCNC: 79 MG/DL (ref 70–99)
POTASSIUM SERPL-SCNC: 4 MMOL/L (ref 3.4–5.3)

## 2020-10-20 PROCEDURE — 84132 ASSAY OF SERUM POTASSIUM: CPT | Performed by: ANESTHESIOLOGY

## 2020-10-20 PROCEDURE — 360N000026 HC SURGERY LEVEL 4 1ST 30 MIN: Performed by: ORTHOPAEDIC SURGERY

## 2020-10-20 PROCEDURE — 250N000011 HC RX IP 250 OP 636: Performed by: ANESTHESIOLOGY

## 2020-10-20 PROCEDURE — 999N000139 HC STATISTIC PRE-PROCEDURE ASSESSMENT II: Performed by: ORTHOPAEDIC SURGERY

## 2020-10-20 PROCEDURE — 250N000011 HC RX IP 250 OP 636: Performed by: PHYSICIAN ASSISTANT

## 2020-10-20 PROCEDURE — 278N000051 HC OR IMPLANT GENERAL: Performed by: ORTHOPAEDIC SURGERY

## 2020-10-20 PROCEDURE — 250N000012 HC RX MED GY IP 250 OP 636 PS 637: Performed by: PHYSICIAN ASSISTANT

## 2020-10-20 PROCEDURE — 258N000003 HC RX IP 258 OP 636: Performed by: ANESTHESIOLOGY

## 2020-10-20 PROCEDURE — 250N000013 HC RX MED GY IP 250 OP 250 PS 637: Performed by: ORTHOPAEDIC SURGERY

## 2020-10-20 PROCEDURE — 370N000002 HC ANESTHESIA TECHNICAL FEE, EACH ADDTL 15 MIN: Performed by: ORTHOPAEDIC SURGERY

## 2020-10-20 PROCEDURE — 250N000011 HC RX IP 250 OP 636: Performed by: ORTHOPAEDIC SURGERY

## 2020-10-20 PROCEDURE — 96372 THER/PROPH/DIAG INJ SC/IM: CPT | Performed by: PHYSICIAN ASSISTANT

## 2020-10-20 PROCEDURE — 99207 PR CDG-CODE CATEGORY CHANGED: CPT | Performed by: PHYSICIAN ASSISTANT

## 2020-10-20 PROCEDURE — 258N000003 HC RX IP 258 OP 636: Performed by: ORTHOPAEDIC SURGERY

## 2020-10-20 PROCEDURE — 272N000001 HC OR GENERAL SUPPLY STERILE: Performed by: ORTHOPAEDIC SURGERY

## 2020-10-20 PROCEDURE — 99219 PR INITIAL OBSERVATION CARE,LEVEL II: CPT | Performed by: PHYSICIAN ASSISTANT

## 2020-10-20 PROCEDURE — 258N000003 HC RX IP 258 OP 636: Performed by: PHYSICIAN ASSISTANT

## 2020-10-20 PROCEDURE — 258N000003 HC RX IP 258 OP 636: Performed by: NURSE ANESTHETIST, CERTIFIED REGISTERED

## 2020-10-20 PROCEDURE — C1776 JOINT DEVICE (IMPLANTABLE): HCPCS | Performed by: ORTHOPAEDIC SURGERY

## 2020-10-20 PROCEDURE — 36415 COLL VENOUS BLD VENIPUNCTURE: CPT | Performed by: ANESTHESIOLOGY

## 2020-10-20 PROCEDURE — 761N000002 HC RECOVERY PHASE 1 LEVEL 1 EA ADDTL HR: Performed by: ORTHOPAEDIC SURGERY

## 2020-10-20 PROCEDURE — 999N001017 HC STATISTIC GLUCOSE BY METER IP

## 2020-10-20 PROCEDURE — 761N000001 HC RECOVERY PHASE 1 LEVEL 1 FIRST HR: Performed by: ORTHOPAEDIC SURGERY

## 2020-10-20 PROCEDURE — 250N000011 HC RX IP 250 OP 636: Performed by: NURSE ANESTHETIST, CERTIFIED REGISTERED

## 2020-10-20 PROCEDURE — 250N000009 HC RX 250: Performed by: ANESTHESIOLOGY

## 2020-10-20 PROCEDURE — 999N000063 XR KNEE PORT RT 1/2 VW: Mod: RT

## 2020-10-20 PROCEDURE — 370N000001 HC ANESTHESIA TECHNICAL FEE, 1ST 30 MIN: Performed by: ORTHOPAEDIC SURGERY

## 2020-10-20 PROCEDURE — 360N000027 HC SURGERY LEVEL 4 EA 15 ADDTL MIN: Performed by: ORTHOPAEDIC SURGERY

## 2020-10-20 PROCEDURE — 250N000009 HC RX 250: Performed by: NURSE ANESTHETIST, CERTIFIED REGISTERED

## 2020-10-20 PROCEDURE — 250N000013 HC RX MED GY IP 250 OP 250 PS 637: Performed by: PHYSICIAN ASSISTANT

## 2020-10-20 PROCEDURE — 82565 ASSAY OF CREATININE: CPT | Performed by: ANESTHESIOLOGY

## 2020-10-20 DEVICE — GENESIS II RESURFACING PATELLAR                                    PROSTHESIS  32MM
Type: IMPLANTABLE DEVICE | Site: KNEE | Status: FUNCTIONAL
Brand: GENESIS II

## 2020-10-20 DEVICE — IMP INSERT ARTICULAR S&N LGN CR XLPE SZ3-4 9MM 71453111: Type: IMPLANTABLE DEVICE | Site: KNEE | Status: FUNCTIONAL

## 2020-10-20 DEVICE — BONE CEMENT RADIOPAQUE SIMPLEX HV FULL DOSE 6194-1-001: Type: IMPLANTABLE DEVICE | Site: KNEE | Status: FUNCTIONAL

## 2020-10-20 DEVICE — IMP BASEPLATE TIBIAL GENESIS II SZ 4 RT TI 71420184: Type: IMPLANTABLE DEVICE | Site: KNEE | Status: FUNCTIONAL

## 2020-10-20 DEVICE — IMP COMP FEMORAL S&N LEGION CR NP NARROW 5 RT 71933646: Type: IMPLANTABLE DEVICE | Site: KNEE | Status: FUNCTIONAL

## 2020-10-20 RX ORDER — FENTANYL CITRATE 50 UG/ML
50 INJECTION, SOLUTION INTRAMUSCULAR; INTRAVENOUS
Status: DISCONTINUED | OUTPATIENT
Start: 2020-10-20 | End: 2020-10-20 | Stop reason: HOSPADM

## 2020-10-20 RX ORDER — GLIMEPIRIDE 4 MG/1
4 TABLET ORAL
Status: DISCONTINUED | OUTPATIENT
Start: 2020-10-21 | End: 2020-10-20

## 2020-10-20 RX ORDER — DEXTROSE MONOHYDRATE 25 G/50ML
25-50 INJECTION, SOLUTION INTRAVENOUS
Status: DISCONTINUED | OUTPATIENT
Start: 2020-10-20 | End: 2020-10-21 | Stop reason: HOSPADM

## 2020-10-20 RX ORDER — EPHEDRINE SULFATE 50 MG/ML
INJECTION, SOLUTION INTRAMUSCULAR; INTRAVENOUS; SUBCUTANEOUS PRN
Status: DISCONTINUED | OUTPATIENT
Start: 2020-10-20 | End: 2020-10-20

## 2020-10-20 RX ORDER — AMOXICILLIN 250 MG
1 CAPSULE ORAL 2 TIMES DAILY
Status: DISCONTINUED | OUTPATIENT
Start: 2020-10-20 | End: 2020-10-21 | Stop reason: HOSPADM

## 2020-10-20 RX ORDER — LISINOPRIL 2.5 MG/1
2.5 TABLET ORAL DAILY
Status: DISCONTINUED | OUTPATIENT
Start: 2020-10-20 | End: 2020-10-20

## 2020-10-20 RX ORDER — HYDROXYZINE HYDROCHLORIDE 10 MG/1
10 TABLET, FILM COATED ORAL EVERY 6 HOURS PRN
Qty: 30 TABLET | Refills: 0 | Status: ON HOLD | OUTPATIENT
Start: 2020-10-20 | End: 2021-01-05

## 2020-10-20 RX ORDER — ONDANSETRON 4 MG/1
4 TABLET, ORALLY DISINTEGRATING ORAL EVERY 30 MIN PRN
Status: DISCONTINUED | OUTPATIENT
Start: 2020-10-20 | End: 2020-10-20 | Stop reason: HOSPADM

## 2020-10-20 RX ORDER — CEFAZOLIN SODIUM 2 G/100ML
2 INJECTION, SOLUTION INTRAVENOUS EVERY 8 HOURS
Status: COMPLETED | OUTPATIENT
Start: 2020-10-20 | End: 2020-10-21

## 2020-10-20 RX ORDER — ATORVASTATIN CALCIUM 10 MG/1
10 TABLET, FILM COATED ORAL AT BEDTIME
Status: DISCONTINUED | OUTPATIENT
Start: 2020-10-20 | End: 2020-10-21 | Stop reason: HOSPADM

## 2020-10-20 RX ORDER — PROPOFOL 10 MG/ML
INJECTION, EMULSION INTRAVENOUS CONTINUOUS PRN
Status: DISCONTINUED | OUTPATIENT
Start: 2020-10-20 | End: 2020-10-20

## 2020-10-20 RX ORDER — ACETAMINOPHEN 325 MG/1
650 TABLET ORAL EVERY 4 HOURS PRN
Qty: 100 TABLET | Refills: 0 | Status: SHIPPED | OUTPATIENT
Start: 2020-10-20

## 2020-10-20 RX ORDER — HYDROMORPHONE HYDROCHLORIDE 1 MG/ML
.3-.5 INJECTION, SOLUTION INTRAMUSCULAR; INTRAVENOUS; SUBCUTANEOUS EVERY 5 MIN PRN
Status: DISCONTINUED | OUTPATIENT
Start: 2020-10-20 | End: 2020-10-20 | Stop reason: HOSPADM

## 2020-10-20 RX ORDER — LIDOCAINE 40 MG/G
CREAM TOPICAL
Status: DISCONTINUED | OUTPATIENT
Start: 2020-10-20 | End: 2020-10-20 | Stop reason: HOSPADM

## 2020-10-20 RX ORDER — HYDROCHLOROTHIAZIDE 25 MG/1
25 TABLET ORAL EVERY EVENING
Status: DISCONTINUED | OUTPATIENT
Start: 2020-10-20 | End: 2020-10-20

## 2020-10-20 RX ORDER — ATENOLOL 25 MG/1
25 TABLET ORAL DAILY
Status: DISCONTINUED | OUTPATIENT
Start: 2020-10-21 | End: 2020-10-21 | Stop reason: HOSPADM

## 2020-10-20 RX ORDER — CEFAZOLIN SODIUM 1 G/3ML
1 INJECTION, POWDER, FOR SOLUTION INTRAMUSCULAR; INTRAVENOUS SEE ADMIN INSTRUCTIONS
Status: DISCONTINUED | OUTPATIENT
Start: 2020-10-20 | End: 2020-10-20 | Stop reason: HOSPADM

## 2020-10-20 RX ORDER — HYDROMORPHONE HYDROCHLORIDE 1 MG/ML
0.4 INJECTION, SOLUTION INTRAMUSCULAR; INTRAVENOUS; SUBCUTANEOUS
Status: DISCONTINUED | OUTPATIENT
Start: 2020-10-20 | End: 2020-10-21 | Stop reason: HOSPADM

## 2020-10-20 RX ORDER — ACETAMINOPHEN 325 MG/1
975 TABLET ORAL EVERY 8 HOURS
Status: DISCONTINUED | OUTPATIENT
Start: 2020-10-20 | End: 2020-10-21 | Stop reason: HOSPADM

## 2020-10-20 RX ORDER — ONDANSETRON 2 MG/ML
4 INJECTION INTRAMUSCULAR; INTRAVENOUS EVERY 30 MIN PRN
Status: DISCONTINUED | OUTPATIENT
Start: 2020-10-20 | End: 2020-10-20 | Stop reason: HOSPADM

## 2020-10-20 RX ORDER — PROCHLORPERAZINE MALEATE 5 MG
5 TABLET ORAL EVERY 6 HOURS PRN
Status: DISCONTINUED | OUTPATIENT
Start: 2020-10-20 | End: 2020-10-21 | Stop reason: HOSPADM

## 2020-10-20 RX ORDER — AMOXICILLIN 250 MG
1-2 CAPSULE ORAL 2 TIMES DAILY
Qty: 30 TABLET | Refills: 0 | Status: ON HOLD | OUTPATIENT
Start: 2020-10-20 | End: 2021-01-05

## 2020-10-20 RX ORDER — MINOCYCLINE HYDROCHLORIDE 100 MG/1
100 CAPSULE ORAL DAILY
Status: DISCONTINUED | OUTPATIENT
Start: 2020-10-20 | End: 2020-10-21 | Stop reason: HOSPADM

## 2020-10-20 RX ORDER — TRANEXAMIC ACID 650 MG/1
1950 TABLET ORAL ONCE
Status: COMPLETED | OUTPATIENT
Start: 2020-10-20 | End: 2020-10-20

## 2020-10-20 RX ORDER — POLYETHYLENE GLYCOL 3350 17 G/17G
17 POWDER, FOR SOLUTION ORAL DAILY
Status: DISCONTINUED | OUTPATIENT
Start: 2020-10-21 | End: 2020-10-21 | Stop reason: HOSPADM

## 2020-10-20 RX ORDER — BUPIVACAINE HYDROCHLORIDE 7.5 MG/ML
INJECTION, SOLUTION INTRASPINAL PRN
Status: DISCONTINUED | OUTPATIENT
Start: 2020-10-20 | End: 2020-10-20

## 2020-10-20 RX ORDER — LIDOCAINE 40 MG/G
CREAM TOPICAL
Status: DISCONTINUED | OUTPATIENT
Start: 2020-10-20 | End: 2020-10-21 | Stop reason: HOSPADM

## 2020-10-20 RX ORDER — LEVOTHYROXINE SODIUM 112 UG/1
112 TABLET ORAL
Status: DISCONTINUED | OUTPATIENT
Start: 2020-10-21 | End: 2020-10-21 | Stop reason: HOSPADM

## 2020-10-20 RX ORDER — HYDROXYZINE HYDROCHLORIDE 10 MG/1
10 TABLET, FILM COATED ORAL EVERY 6 HOURS PRN
Status: DISCONTINUED | OUTPATIENT
Start: 2020-10-20 | End: 2020-10-21 | Stop reason: HOSPADM

## 2020-10-20 RX ORDER — FENTANYL CITRATE 50 UG/ML
25-50 INJECTION, SOLUTION INTRAMUSCULAR; INTRAVENOUS
Status: DISCONTINUED | OUTPATIENT
Start: 2020-10-20 | End: 2020-10-20 | Stop reason: HOSPADM

## 2020-10-20 RX ORDER — SODIUM CHLORIDE, SODIUM LACTATE, POTASSIUM CHLORIDE, CALCIUM CHLORIDE 600; 310; 30; 20 MG/100ML; MG/100ML; MG/100ML; MG/100ML
INJECTION, SOLUTION INTRAVENOUS CONTINUOUS
Status: DISCONTINUED | OUTPATIENT
Start: 2020-10-20 | End: 2020-10-20 | Stop reason: HOSPADM

## 2020-10-20 RX ORDER — NICOTINE POLACRILEX 4 MG
15-30 LOZENGE BUCCAL
Status: DISCONTINUED | OUTPATIENT
Start: 2020-10-20 | End: 2020-10-21 | Stop reason: HOSPADM

## 2020-10-20 RX ORDER — HYDROMORPHONE HYDROCHLORIDE 1 MG/ML
0.2 INJECTION, SOLUTION INTRAMUSCULAR; INTRAVENOUS; SUBCUTANEOUS
Status: DISCONTINUED | OUTPATIENT
Start: 2020-10-20 | End: 2020-10-21 | Stop reason: HOSPADM

## 2020-10-20 RX ORDER — NALOXONE HYDROCHLORIDE 0.4 MG/ML
.1-.4 INJECTION, SOLUTION INTRAMUSCULAR; INTRAVENOUS; SUBCUTANEOUS
Status: DISCONTINUED | OUTPATIENT
Start: 2020-10-20 | End: 2020-10-21 | Stop reason: HOSPADM

## 2020-10-20 RX ORDER — ACETAMINOPHEN 325 MG/1
650 TABLET ORAL EVERY 4 HOURS PRN
Status: DISCONTINUED | OUTPATIENT
Start: 2020-10-23 | End: 2020-10-21 | Stop reason: HOSPADM

## 2020-10-20 RX ORDER — OXYCODONE HYDROCHLORIDE 5 MG/1
10 TABLET ORAL EVERY 4 HOURS PRN
Status: DISCONTINUED | OUTPATIENT
Start: 2020-10-20 | End: 2020-10-21 | Stop reason: HOSPADM

## 2020-10-20 RX ORDER — SODIUM CHLORIDE, SODIUM LACTATE, POTASSIUM CHLORIDE, CALCIUM CHLORIDE 600; 310; 30; 20 MG/100ML; MG/100ML; MG/100ML; MG/100ML
INJECTION, SOLUTION INTRAVENOUS CONTINUOUS
Status: DISCONTINUED | OUTPATIENT
Start: 2020-10-20 | End: 2020-10-21 | Stop reason: HOSPADM

## 2020-10-20 RX ORDER — ONDANSETRON 2 MG/ML
INJECTION INTRAMUSCULAR; INTRAVENOUS PRN
Status: DISCONTINUED | OUTPATIENT
Start: 2020-10-20 | End: 2020-10-20

## 2020-10-20 RX ORDER — CEFAZOLIN SODIUM 2 G/100ML
2 INJECTION, SOLUTION INTRAVENOUS
Status: COMPLETED | OUTPATIENT
Start: 2020-10-20 | End: 2020-10-20

## 2020-10-20 RX ORDER — DOCUSATE SODIUM 100 MG/1
100 CAPSULE, LIQUID FILLED ORAL 2 TIMES DAILY
Status: DISCONTINUED | OUTPATIENT
Start: 2020-10-20 | End: 2020-10-21 | Stop reason: HOSPADM

## 2020-10-20 RX ORDER — OXYCODONE HYDROCHLORIDE 5 MG/1
5 TABLET ORAL EVERY 4 HOURS PRN
Status: DISCONTINUED | OUTPATIENT
Start: 2020-10-20 | End: 2020-10-21 | Stop reason: HOSPADM

## 2020-10-20 RX ORDER — ONDANSETRON 4 MG/1
4 TABLET, ORALLY DISINTEGRATING ORAL EVERY 6 HOURS PRN
Status: DISCONTINUED | OUTPATIENT
Start: 2020-10-20 | End: 2020-10-21 | Stop reason: HOSPADM

## 2020-10-20 RX ORDER — BISACODYL 10 MG
10 SUPPOSITORY, RECTAL RECTAL DAILY PRN
Status: DISCONTINUED | OUTPATIENT
Start: 2020-10-20 | End: 2020-10-21 | Stop reason: HOSPADM

## 2020-10-20 RX ORDER — ONDANSETRON 2 MG/ML
4 INJECTION INTRAMUSCULAR; INTRAVENOUS EVERY 6 HOURS PRN
Status: DISCONTINUED | OUTPATIENT
Start: 2020-10-20 | End: 2020-10-21 | Stop reason: HOSPADM

## 2020-10-20 RX ORDER — FENTANYL CITRATE 50 UG/ML
INJECTION, SOLUTION INTRAMUSCULAR; INTRAVENOUS PRN
Status: DISCONTINUED | OUTPATIENT
Start: 2020-10-20 | End: 2020-10-20

## 2020-10-20 RX ORDER — OXYCODONE HYDROCHLORIDE 5 MG/1
5-10 TABLET ORAL
Qty: 50 TABLET | Refills: 0 | Status: ON HOLD | OUTPATIENT
Start: 2020-10-20 | End: 2021-01-05

## 2020-10-20 RX ORDER — POLYETHYLENE GLYCOL 3350 17 G/17G
1 POWDER, FOR SOLUTION ORAL DAILY PRN
Qty: 7 PACKET | Refills: 0 | Status: ON HOLD | OUTPATIENT
Start: 2020-10-20 | End: 2021-01-05

## 2020-10-20 RX ORDER — TAMSULOSIN HYDROCHLORIDE 0.4 MG/1
0.4 CAPSULE ORAL EVERY EVENING
Status: DISCONTINUED | OUTPATIENT
Start: 2020-10-20 | End: 2020-10-21 | Stop reason: HOSPADM

## 2020-10-20 RX ADMIN — CEFAZOLIN SODIUM 2 G: 2 INJECTION, SOLUTION INTRAVENOUS at 12:16

## 2020-10-20 RX ADMIN — PHENYLEPHRINE HYDROCHLORIDE 100 MCG: 10 INJECTION INTRAVENOUS at 12:22

## 2020-10-20 RX ADMIN — TRANEXAMIC ACID 1950 MG: 650 TABLET ORAL at 09:53

## 2020-10-20 RX ADMIN — PHENYLEPHRINE HYDROCHLORIDE 100 MCG: 10 INJECTION INTRAVENOUS at 12:40

## 2020-10-20 RX ADMIN — OXYCODONE HYDROCHLORIDE 5 MG: 5 TABLET ORAL at 21:06

## 2020-10-20 RX ADMIN — PROPOFOL 100 MCG/KG/MIN: 10 INJECTION, EMULSION INTRAVENOUS at 12:06

## 2020-10-20 RX ADMIN — PHENYLEPHRINE HYDROCHLORIDE 100 MCG: 10 INJECTION INTRAVENOUS at 12:28

## 2020-10-20 RX ADMIN — SODIUM CHLORIDE, POTASSIUM CHLORIDE, SODIUM LACTATE AND CALCIUM CHLORIDE: 600; 310; 30; 20 INJECTION, SOLUTION INTRAVENOUS at 12:28

## 2020-10-20 RX ADMIN — Medication 5 MG: at 13:02

## 2020-10-20 RX ADMIN — PHENYLEPHRINE HYDROCHLORIDE 200 MCG: 10 INJECTION INTRAVENOUS at 13:05

## 2020-10-20 RX ADMIN — PHENYLEPHRINE HYDROCHLORIDE 100 MCG: 10 INJECTION INTRAVENOUS at 12:19

## 2020-10-20 RX ADMIN — FENTANYL CITRATE 50 MCG: 50 INJECTION, SOLUTION INTRAMUSCULAR; INTRAVENOUS at 11:56

## 2020-10-20 RX ADMIN — PHENYLEPHRINE HYDROCHLORIDE 150 MCG: 10 INJECTION INTRAVENOUS at 12:33

## 2020-10-20 RX ADMIN — DOCUSATE SODIUM 50 MG AND SENNOSIDES 8.6 MG 1 TABLET: 8.6; 5 TABLET, FILM COATED ORAL at 20:24

## 2020-10-20 RX ADMIN — Medication 5 MG: at 12:33

## 2020-10-20 RX ADMIN — PHENYLEPHRINE HYDROCHLORIDE 100 MCG: 10 INJECTION INTRAVENOUS at 12:46

## 2020-10-20 RX ADMIN — ASPIRIN 325 MG: 325 TABLET, COATED ORAL at 19:08

## 2020-10-20 RX ADMIN — OXYCODONE HYDROCHLORIDE 5 MG: 5 TABLET ORAL at 20:24

## 2020-10-20 RX ADMIN — ACETAMINOPHEN 975 MG: 325 TABLET, FILM COATED ORAL at 19:08

## 2020-10-20 RX ADMIN — SODIUM CHLORIDE, POTASSIUM CHLORIDE, SODIUM LACTATE AND CALCIUM CHLORIDE: 600; 310; 30; 20 INJECTION, SOLUTION INTRAVENOUS at 10:37

## 2020-10-20 RX ADMIN — TAMSULOSIN HYDROCHLORIDE 0.4 MG: 0.4 CAPSULE ORAL at 20:24

## 2020-10-20 RX ADMIN — BUPIVACAINE HYDROCHLORIDE IN DEXTROSE 12 MG: 7.5 INJECTION, SOLUTION SUBARACHNOID at 12:01

## 2020-10-20 RX ADMIN — MIDAZOLAM 1 MG: 1 INJECTION INTRAMUSCULAR; INTRAVENOUS at 12:02

## 2020-10-20 RX ADMIN — PHENYLEPHRINE HYDROCHLORIDE 100 MCG: 10 INJECTION INTRAVENOUS at 13:23

## 2020-10-20 RX ADMIN — FENTANYL CITRATE 50 MCG: 50 INJECTION, SOLUTION INTRAMUSCULAR; INTRAVENOUS at 11:13

## 2020-10-20 RX ADMIN — PHENYLEPHRINE HYDROCHLORIDE 0.3 MCG/KG/MIN: 10 INJECTION INTRAVENOUS at 12:46

## 2020-10-20 RX ADMIN — MIDAZOLAM HYDROCHLORIDE 1 MG: 1 INJECTION, SOLUTION INTRAMUSCULAR; INTRAVENOUS at 11:13

## 2020-10-20 RX ADMIN — INSULIN GLARGINE 15 UNITS: 100 INJECTION, SOLUTION SUBCUTANEOUS at 21:59

## 2020-10-20 RX ADMIN — ONDANSETRON 4 MG: 2 INJECTION INTRAMUSCULAR; INTRAVENOUS at 13:35

## 2020-10-20 RX ADMIN — PHENYLEPHRINE HYDROCHLORIDE 100 MCG: 10 INJECTION INTRAVENOUS at 12:12

## 2020-10-20 RX ADMIN — CEFAZOLIN SODIUM 2 G: 2 INJECTION, SOLUTION INTRAVENOUS at 20:24

## 2020-10-20 RX ADMIN — DOCUSATE SODIUM 100 MG: 100 CAPSULE, LIQUID FILLED ORAL at 20:24

## 2020-10-20 RX ADMIN — HYDROXYZINE HYDROCHLORIDE 10 MG: 10 TABLET, FILM COATED ORAL at 21:06

## 2020-10-20 RX ADMIN — ATORVASTATIN CALCIUM 10 MG: 10 TABLET, FILM COATED ORAL at 21:59

## 2020-10-20 RX ADMIN — BUPIVACAINE HYDROCHLORIDE 15 ML GIVEN: 5 INJECTION, SOLUTION EPIDURAL; INTRACAUDAL; PERINEURAL at 11:29

## 2020-10-20 ASSESSMENT — MIFFLIN-ST. JEOR: SCORE: 1523.71

## 2020-10-20 NOTE — ANESTHESIA PROCEDURE NOTES
Procedure note : Adductor canal      Staff -   Anesthesiologist:  Luigi Jean-Baptiste DO  Performed By: anesthesiologist  Pre-Procedure  Performed by Luigi Jean-Baptiste DO  Location: pre-op      Pre-Anesthestic Checklist: patient identified, IV checked, site marked, risks and benefits discussed, informed consent, monitors and equipment checked, pre-op evaluation, at physician/surgeon's request and post-op pain management    Timeout  Correct Patient: Yes   Correct Procedure: Yes   Correct Site: Yes   Correct Laterality: Yes   Correct Position: Yes   Site Marked: Yes   .   Procedure Documentation    .    Procedure: Adductor canal, right.   Patient Position:supine Local skin infiltrated with 3 mL of 1% lidocaine.    Ultrasound used to identify targeted nerve, plexus, or vascular marker and placed a needle adjacent to it., Ultrasound was used to visualize the spread of the anesthetic in close proximity to the above stated nerve. A permanent image is entered into the patient's record.  Patient Prep/Sterile Barriers; mask, sterile gloves, chlorhexidine gluconate and isopropyl alcohol, patient draped.  .  Needle: insulated, short bevel   Needle Gauge: 21.    Needle Length (Inches) 4   Insertion Method: Single Shot.        Assessment/Narrative  Paresthesias: No.  .  The placement was negative for: blood aspirated, painful injection and site bleeding.  Bolus given via needle..   Secured via.   Complications: none. Test dose of mL at. Test dose negative for signs of intravascular, subdural or intrathecal injection. Comments:  Ultrasound Interpretation, peripheral nerve block    1.  Under ultrasound guidance, the needle was inserted and placed in close proximity to the target nerve(s).  2. Ultrasound was also used to visualize the spread of the anesthetic in close proximity to the nerve(s) being blocked.  15 ml of 0.5% Bupivacaine w/ 1:400K Epi, in total, was administered in incremental doses, with intermittent  negative aspiration.     3. The nerve(s) appeared anatomically normal.  4. There were no apparent abnormal pathological findings.  5. A permanent ultrasound image was saved in the patient's record.    Pt tolerated the procedure well.     The surgeon has given a verbal order transferring care of this patient to me for the performance of a regional analgesia block for post-op pain control. It is requested of me because I am uniquely trained and qualified to perform this block and the surgeon is neither trained nor qualified to perform this procedure.

## 2020-10-20 NOTE — ANESTHESIA POSTPROCEDURE EVALUATION
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    Anesthesia Type:  Spinal    Note:  Anesthesia Post Evaluation    Patient location during evaluation: bedside  Patient participation: Able to participate in evaluation but full recovery from regional anesthesia has not yet ocurrred but is anticipated to occur within 48 hours  Level of consciousness: awake  Pain management: adequate  Airway patency: patent  Cardiovascular status: acceptable  Respiratory status: acceptable  Hydration status: acceptable  PONV: none     Anesthetic complications: None          Last vitals:  Vitals:    10/20/20 1640 10/20/20 1650 10/20/20 1700   BP: 107/58 103/63    Pulse: 65 68    Resp: 14 13    Temp: 36.2  C (97.1  F)     SpO2: 100% 100% 100%         Electronically Signed By: Luigi Jean-Baptiste DO, DO  October 20, 2020  5:14 PM

## 2020-10-20 NOTE — BRIEF OP NOTE
[facility]    Brief Operative Note    Pre-operative diagnosis: Right knee DJD [M17.11]  Post-operative diagnosis Same as pre-operative diagnosis    Procedure: Procedure(s):  RIGHT TOTAL KNEE ARTHROPLASTY  Surgeon: Surgeon(s) and Role:     * Adán Zafar MD - Primary     * Ritu Coello PA-C - Assisting  Anesthesia: Combined Spinal with Femoral Nerve Block   Estimated blood loss: 25  Drains: Hemovac  Specimens: * No specimens in log *  Findings:   None.  Complications: None.  Implants:   Implant Name Type Inv. Item Serial No.  Lot No. LRB No. Used Action   BONE CEMENT RADIOPAQUE SIMPLEX HV FULL DOSE 6194-1-001 Cement, Bone BONE CEMENT RADIOPAQUE SIMPLEX HV FULL DOSE 6194-1-001  [employer] [id number] Right 1 Implanted   IMP COMP PATELLA SNR EVELIN II 9X32MM [id number] Total Joint Component/Insert IMP COMP PATELLA SNR EVELIN II 9X32MM [id number]  [employer]  [id number] Right 1 Implanted   IMP COMP FEMORAL S&N LEGION CR NP NARROW 5 RT [id number] Total Joint Component/Insert IMP COMP FEMORAL S&N LEGION CR NP NARROW 5 RT [id number]  [employer]  [id number] Right 1 Implanted   IMP BASEPLATE TIBIAL EVELIN II SZ 4 RT TI [id number] Total Joint Component/Insert IMP BASEPLATE TIBIAL EVELIN II SZ 4 RT TI [id number]  [employer]  [id number] Right 1 Implanted   IMP INSERT ARTICULAR S&N LGN CR XLPE SZ3-4 9MM [id number] Total Joint Component/Insert IMP INSERT ARTICULAR S&N LGN CR XLPE SZ3-4 9MM [id number]  [employer]  [id number] Right 1 Implanted

## 2020-10-20 NOTE — ANESTHESIA PROCEDURE NOTES
Procedure note : intrathecal      Staff -   Anesthesiologist:  Luigi Jean-Baptiste DO  Performed By: anesthesiologist  Pre-Procedure  Performed by Luigi Jean-Baptiste DO  Location: OR      Pre-Anesthestic Checklist: patient identified, IV checked, site marked, risks and benefits discussed, informed consent, monitors and equipment checked, pre-op evaluation, at physician/surgeon's request and post-op pain management    Timeout  Correct Patient: Yes   Correct Procedure: Yes   Correct Site: Yes   Correct Laterality: Yes   Correct Position: Yes   Site Marked: Yes   .   Procedure Documentation    .    Procedure: intrathecal, .   Patient Position:sitting Insertion Site:L3-4  (midline approach)     Patient Prep/Sterile Barriers; mask, sterile gloves, povidone-iodine 7.5% surgical scrub, patient draped.  .  Needle:  Spinal Needle (gauge): 25  Spinal/LP Needle Length (inches): 3.5 # of attempts: 1 and # of redirects:  Introducer used Introducer: 20 G .        Assessment/Narrative  Paresthesias: No.  .  .  clear CSF fluid removed . Time Injected:while sitting   Comments:  1.6ml .75% Bupiv with dextrose

## 2020-10-20 NOTE — OP NOTE
Procedure Date: 10/20/2020      PREOPERATIVE DIAGNOSIS:  Right knee advanced osteoarthritis.      POSTOPERATIVE DIAGNOSIS:  Right knee advanced osteoarthritis.      PROCEDURE:  Right total knee arthroplasty.      SURGEON:  Adán Zafar MD      ASSISTANT:  Ritu Coello PA-C      ANESTHESIA:  Spinal, adductor canal regional, periarticular block.      ESTIMATED BLOOD LOSS:  25 mL      IMPLANTS:  [employer] system:   1.  Size 5 narrow right cobalt chrome CR femoral component.   2.  Size 4 right standard tibial baseplate.   3.  Size 9, 3/4 high flex CR polyethylene.   4.  Size 32 concentric polyethylene patella.      DETAILS OF PROCEDURE:  Mr. Benjamin is brought to the operating room on 10/20/2020 for right total knee arthroplasty.  He was seen in the preoperative holding area, right knee marked as the correct operative site.  He received a dose of IV antibiotic within 30 minutes prior to surgical incision.  Post-surgical antibiotic and DVT prophylaxis are indicated and will be prescribed.  Received a dose of tranexamic acid.  Received an adductor canal nerve block for postop pain management.      The patient was brought to the operating room and placed under a spinal anesthesia.  Positioned supine.  The right lower extremity was prepped and draped in the standard sterile fashion.  Preoperative timeout was taken and thigh tourniquet inflated to 250 mmHg.  Anterior longitudinal surgical incision was made over the right knee.  Medial parapatellar arthrotomy performed.  Patella was cut to a thickness of 14 mm and sized to a 32.  The femur was prepared with an intramedullary guide maria dolores and a 5-degree valgus cutting guide.  Primary cut was made.  Cut distal femur, sized to a 5 narrow, prepared in the appropriate rotational alignment.  Tibia was then prepared with an extramedullary cutting guide, taking 7 mm of bone and cartilage off the lateral side.  The cut proximal tibia was sized to a 4.  It was  punched in the appropriate rotational alignment centered on the medial third of the tibial tubercle.  Posterior osteophytes and menisci removed.  Periarticular block was performed.  With trial components in place and a size 9 trial CR polyethylene for the 4 baseplate, I was very pleased with stability, range of motion and balanced gaps.  Trial components were removed.  Jet lavage irrigation performed.        Components listed above were cemented into place using 1 batch of high viscosity Simplex cement.  Once the cement had hardened and all extruded cement removed, we implanted the size 9 CR polyethylene for the 4 baseplate.  Tourniquet deflated.  Hemostasis achieved.  Betadine wash performed.  Jet lavage irrigation performed.  Arthrotomy was closed with interrupted Ethibond sutures over a medium Hemovac drain.  Superficial soft tissues irrigated and closed in layers and a sterile dressing was applied.  The patient was brought to the recovery area in stable condition.  Needle and lap counts were correct at the end of the case.  There were no known complications.         DARIN WELCH MD             D: 10/20/2020   T: 10/20/2020   MT: [place]      Name:     AMARIS CHILDRESS   MRN:      [id number]        Account:        [id number]   :      1945           Procedure Date: 10/20/2020      Document: [id number]

## 2020-10-20 NOTE — ANESTHESIA CARE TRANSFER NOTE
Patient: Geoffrey Benjamin    Procedure(s):  RIGHT TOTAL KNEE ARTHROPLASTY    Diagnosis: Right knee DJD [M17.11]  Diagnosis Additional Information: No value filed.    Anesthesia Type:   Spinal     Note:  Airway :Face Mask  Patient transferred to:PACU  Comments: Pt to PACU with O2 via mask, airway patent, VSS.  Report to RN.Handoff Report: Identifed the Patient, Identified the Reponsible Provider, Reviewed the pertinent medical history, Discussed the surgical course, Reviewed Intra-OP anesthesia mangement and issues during anesthesia, Set expectations for post-procedure period and Allowed opportunity for questions and acknowledgement of understanding      Vitals: (Last set prior to Anesthesia Care Transfer)    CRNA VITALS  10/20/2020 1323 - 10/20/2020 1358      10/20/2020             NIBP:  111/62    NIBP Mean:  77                Electronically Signed By: LUCILLE Leal CRNA  October 20, 2020  1:58 PM

## 2020-10-20 NOTE — ANESTHESIA PREPROCEDURE EVALUATION
Anesthesia Pre-Procedure Evaluation    Patient: Geoffrey Benjamin   MRN: [id number] : 1945          Preoperative Diagnosis: Right knee DJD [M17.11]    Procedure(s):  RIGHT TOTAL KNEE ARTHROPLASTY    Past Medical History:   Diagnosis Date     Acne rosacea      Benign neoplasm of colon      Bilateral lower extremity edema      Conductive hearing loss      Coronary artery disease      Depression     off medication since     Diverticulosis      Former smoker      Gastro-oesophageal reflux disease      Gout      Graves disease      Hyperlipidemia      Hypertension      Hypothyroidism      Impotence of organic origin      Kidney stone      Metabolic syndrome      Osteoarthritis     hands and back     Other and unspecified hyperlipidemia      PONV (postoperative nausea and vomiting)      Postsurgical aortocoronary bypass status      S/P CABG x 4 2006    LIMA to LAD and a diagonal branch artery sequentially as well as a SVG to the posterior branch of the right coronary artery and a free radial artery to the obtuse marginal branch arter     Type II or unspecified type diabetes mellitus without mention of complication, not stated as uncontrolled      Unspecified hypothyroidism      Past Surgical History:   Procedure Laterality Date     ARTHROSCOPY KNEE      BILATERAL     BIOPSY      prostate     CORONARY ANGIOGRAPHY ADULT ORDER  2006     CORONARY ARTERY BYPASS  2006    LIMA to LAD and diagonal branch arteries sequentially, SVG to PDA of RCA, and free radial artery to OM     CYSTOSCOPY       ENT SURGERY      septoplasty x2     EXTRACORPOREAL SHOCK WAVE LITHOTRIPSY, CYSTOSCOPY, INSERT STENT URETER(S), COMBINED  2013    Procedure: COMBINED EXTRACORPOREAL SHOCK WAVE LITHOTRIPSY, CYSTOSCOPY, INSERT STENT URETER(S);  CYSTOSCOPY, LEFT URETERAL STENT PLACEMENT, LEFT EXTRACORPOREAL SHOCK WAVE LITHOTRIPSY (ESWL)        EYE SURGERY Right     cataract     FOOT SURGERY      FUSION & TENDON REPAIR     HERNIA  REPAIR       LASER HOLMIUM LITHOTRIPSY URETER(S), INSERT STENT, COMBINED Left 5/8/2018    Procedure: COMBINED CYSTOSCOPY, URETEROSCOPY, LASER HOLMIUM LITHOTRIPSY URETER(S), INSERT STENT;  1.  Video cystoscopy.    2.  Balloon dilation left ureter.    3.  Left ureteroscopy with holmium laser lithotripsy and stone extractions.    4.  Left double-J stent insertion (4.7 Mongolian x 24 cm).   5.  Exam under anesthesia.;  Surgeon: Alfonso Sinha MD;  Location: [facility] OR     LASER HOLMIUM LITHOTRIPSY URETER(S), INSERT STENT, COMBINED Left 6/5/2018    Procedure: COMBINED CYSTOSCOPY, URETEROSCOPY, LASER HOLMIUM LITHOTRIPSY URETER(S), INSERT STENT;  1. Cystourethroscopy  2. Left flexible ureteroscopy with holmium laser lithotripsy and stone extraction  3. Left retrograde pyelography with interpretation of intraoperative fluoroscopic imaging  4. Left ureteral stent placement;  Surgeon: Romeo Cheng MD;  Location: [facility] OR     ORTHOPEDIC SURGERY Left     subtailor fusion, tendon repair     SURGICAL HISTORY OF -   1988    Septoplasty at 13     SURGICAL HISTORY OF -   1/2005    Ureteral stent and lithotripsy for kidney stones     SURGICAL HISTORY OF -   8/2013    cataract extraction OD       Anesthesia Evaluation     .             ROS/MED HX    ENT/Pulmonary:      (-) sleep apnea   Neurologic:  - neg neurologic ROS     Cardiovascular: Comment: S/p CABG 2006    (+) Dyslipidemia, hypertension--CAD, --. : . . . :. .       METS/Exercise Tolerance:     Hematologic:  - neg hematologic  ROS       Musculoskeletal:   (+) arthritis,  -       GI/Hepatic:     (+) GERD Asymptomatic on medication,       Renal/Genitourinary:     (+) chronic renal disease, Nephrolithiasis ,       Endo:     (+) type I DM, Not using insulin .      Psychiatric:  - neg psychiatric ROS       Infectious Disease:         Malignancy:         Other:                          Physical Exam  Normal systems: cardiovascular, pulmonary and dental    Airway  "  Mallampati: II  TM distance: >3 FB  Neck ROM: full    Dental     Cardiovascular       Pulmonary             Lab Results   Component Value Date    WBC 8.9 11/08/2006    HGB 15.8 10/19/2015    HCT 32.7 (L) 11/08/2006     (L) 11/08/2006     10/19/2015    POTASSIUM 4.7 05/08/2018    CHLORIDE 105 10/19/2015    CO2 29 11/08/2006    BUN 14 10/19/2015    CR 0.78 05/08/2018     (A) 10/19/2015    RULA 9.4 10/19/2015    MAG 2.0 11/08/2006    ALBUMIN 4.2 10/19/2015    PROTTOTAL 6.6 10/19/2015    ALT 20 10/19/2015    AST 18 10/19/2015    ALKPHOS 74 10/19/2015    BILITOTAL 0.3 10/19/2015    PTT 30 11/07/2006    INR 1.42 (H) 11/07/2006       Preop Vitals  BP Readings from Last 3 Encounters:   05/13/20 102/62   02/24/20 122/68   11/19/19 112/66    Pulse Readings from Last 3 Encounters:   05/13/20 81   02/24/20 80   11/19/19 82      Resp Readings from Last 3 Encounters:   06/05/18 14   05/08/18 18   08/13/13 18    SpO2 Readings from Last 3 Encounters:   05/13/20 98%   08/02/19 98%   12/21/18 97%      Temp Readings from Last 1 Encounters:   06/05/18 36.7  C (98  F) (Temporal)    Ht Readings from Last 1 Encounters:   05/13/20 1.676 m (5' 6\")      Wt Readings from Last 1 Encounters:   05/13/20 81.6 kg (180 lb)    Estimated body mass index is 29.05 kg/m  as calculated from the following:    Height as of 5/13/20: 1.676 m (5' 6\").    Weight as of 5/13/20: 81.6 kg (180 lb).       Anesthesia Plan      History & Physical Review  History and physical reviewed and following examination; no interval change.    ASA Status:  2 .    NPO Status:  > 8 hours    Plan for Spinal   PONV prophylaxis:  Ondansetron (or other 5HT-3) and Dexamethasone or Solumedrol         Postoperative Care  Postoperative pain management:  IV analgesics and Peripheral nerve block (Single Shot).      Consents  Anesthetic plan, risks, benefits and alternatives discussed with:  Patient..                 Luigi Jean-Baptiste, DO, DO  "

## 2020-10-20 NOTE — DISCHARGE INSTRUCTIONS
DISCHARGE INSTRUCTIONS AFTER YOUR TOTAL KNEE REPLACEMENT      DARIN WELCH MD       Instructions to care for your wound at home:   Change the dressing daily.  Inspect your incision at the time of dressing change for increased redness, tenderness, swelling, or drainage along the incision line.  Some bruising or discoloration is usually present, but call my office for any changes in appearance that concern you.  Also call if you develop a fever above 101 degrees.     You may shower directly over the wound beginning 4 days after your surgery, but do not submerge the wound under water until after your post operative visit when the sutures are removed and the wound is completely sealed without drainage.    Activities:  Physical activity may be resumed gradually according to your comfort level. You may bear weight on your operative leg as tolerated with your crutches or walker as instructed by your therapist.  Follow your home exercise program.  Ice your knee after exercising.        Wear your knee immobilizer at night only until your office visit in 2 weeks to maintain full knee extension.  Do not use the immobilizer during the day unless otherwise instructed.      Wear the anti-embolism stockings day and night until seen in the office for your post operative visit. Remove them twice daily for one hour at a time. Keep the compression stockings flat on your leg.  Do not allow them to roll up or crease your skin.  Call if you develop new pain behind your calf or thigh.     Outpatient Physical Therapy and home exercises:  Outpatient physical therapy visits are required following discharge from the hospital. The referral for these outpatient therapy visits is routinely given to you at the time of your surgical scheduling. You should have already scheduled your therapy sessions in advance.  If you have not done so, please immediately call the therapy site of your choice to schedule the physical therapy regimen that has  been prescribed for you.  You may discontinue the crutches or walker per the therapist's recommendation.      Medications:  New medications for you on discharge will include a pain medication, a stool softener while on the narcotic pain medication, and a blood thinner.  Detailed instructions will come with those medications.  You will also receive instructions on when to resume your home medications.     Antibiotic coverage will be needed before any type of dental procedure for at least the next two years due to the risk of infection.  After two years, antibiotic coverage is optional.  You should notify your dentist of your total knee surgery and call your dentist or our office one week before a dental appointment for antibiotics.        Clinic follow-up appointment:  Your clinic follow-up appointment has been prearranged.  Call [phone] with any questions.    Adán Zafar MD

## 2020-10-21 ENCOUNTER — APPOINTMENT (OUTPATIENT)
Dept: PHYSICAL THERAPY | Facility: CLINIC | Age: 75
End: 2020-10-21
Attending: ORTHOPAEDIC SURGERY
Payer: MEDICARE

## 2020-10-21 VITALS
RESPIRATION RATE: 16 BRPM | BODY MASS INDEX: 29.97 KG/M2 | HEIGHT: 66 IN | SYSTOLIC BLOOD PRESSURE: 111 MMHG | HEART RATE: 82 BPM | WEIGHT: 186.5 LBS | TEMPERATURE: 98.4 F | OXYGEN SATURATION: 92 % | DIASTOLIC BLOOD PRESSURE: 62 MMHG

## 2020-10-21 LAB
ANION GAP SERPL CALCULATED.3IONS-SCNC: 5 MMOL/L (ref 3–14)
BUN SERPL-MCNC: 14 MG/DL (ref 7–30)
CALCIUM SERPL-MCNC: 8.3 MG/DL (ref 8.5–10.1)
CHLORIDE SERPL-SCNC: 106 MMOL/L (ref 94–109)
CO2 SERPL-SCNC: 28 MMOL/L (ref 20–32)
CREAT SERPL-MCNC: 0.93 MG/DL (ref 0.66–1.25)
GFR SERPL CREATININE-BSD FRML MDRD: 80 ML/MIN/{1.73_M2}
GLUCOSE BLDC GLUCOMTR-MCNC: 144 MG/DL (ref 70–99)
GLUCOSE SERPL-MCNC: 85 MG/DL (ref 70–99)
HBA1C MFR BLD: 7.9 % (ref 0–5.6)
HGB BLD-MCNC: 14.1 G/DL (ref 13.3–17.7)
POTASSIUM SERPL-SCNC: 3.9 MMOL/L (ref 3.4–5.3)
SODIUM SERPL-SCNC: 139 MMOL/L (ref 133–144)

## 2020-10-21 PROCEDURE — 250N000013 HC RX MED GY IP 250 OP 250 PS 637: Mod: GY | Performed by: ORTHOPAEDIC SURGERY

## 2020-10-21 PROCEDURE — 97116 GAIT TRAINING THERAPY: CPT | Mod: GP

## 2020-10-21 PROCEDURE — 80048 BASIC METABOLIC PNL TOTAL CA: CPT | Performed by: PHYSICIAN ASSISTANT

## 2020-10-21 PROCEDURE — 85018 HEMOGLOBIN: CPT | Performed by: PHYSICIAN ASSISTANT

## 2020-10-21 PROCEDURE — 97110 THERAPEUTIC EXERCISES: CPT | Mod: GP

## 2020-10-21 PROCEDURE — 250N000011 HC RX IP 250 OP 636: Performed by: ORTHOPAEDIC SURGERY

## 2020-10-21 PROCEDURE — 999N000111 HC STATISTIC OT IP EVAL DEFER: Performed by: OCCUPATIONAL THERAPIST

## 2020-10-21 PROCEDURE — 999N001017 HC STATISTIC GLUCOSE BY METER IP

## 2020-10-21 PROCEDURE — 99207 PR APP CREDIT; MD BILLING SHARED VISIT: CPT | Performed by: PHYSICIAN ASSISTANT

## 2020-10-21 PROCEDURE — 36415 COLL VENOUS BLD VENIPUNCTURE: CPT | Performed by: PHYSICIAN ASSISTANT

## 2020-10-21 PROCEDURE — 99225 PR SUBSEQUENT OBSERVATION CARE,LEVEL II: CPT | Performed by: PHYSICIAN ASSISTANT

## 2020-10-21 PROCEDURE — 97161 PT EVAL LOW COMPLEX 20 MIN: CPT | Mod: GP,KX

## 2020-10-21 PROCEDURE — 250N000013 HC RX MED GY IP 250 OP 250 PS 637: Performed by: PHYSICIAN ASSISTANT

## 2020-10-21 PROCEDURE — 83036 HEMOGLOBIN GLYCOSYLATED A1C: CPT | Performed by: PHYSICIAN ASSISTANT

## 2020-10-21 PROCEDURE — 99207 PR CDG-MDM COMPONENT: MEETS MODERATE - UP CODED: CPT | Performed by: PHYSICIAN ASSISTANT

## 2020-10-21 RX ADMIN — MINOCYCLINE HYDROCHLORIDE 100 MG: 100 CAPSULE ORAL at 09:08

## 2020-10-21 RX ADMIN — OXYCODONE HYDROCHLORIDE 10 MG: 5 TABLET ORAL at 06:00

## 2020-10-21 RX ADMIN — ACETAMINOPHEN 975 MG: 325 TABLET, FILM COATED ORAL at 09:07

## 2020-10-21 RX ADMIN — DOCUSATE SODIUM 50 MG AND SENNOSIDES 8.6 MG 1 TABLET: 8.6; 5 TABLET, FILM COATED ORAL at 09:07

## 2020-10-21 RX ADMIN — LEVOTHYROXINE SODIUM 112 MCG: 112 TABLET ORAL at 06:01

## 2020-10-21 RX ADMIN — OXYCODONE HYDROCHLORIDE 10 MG: 5 TABLET ORAL at 01:08

## 2020-10-21 RX ADMIN — ATENOLOL 25 MG: 25 TABLET ORAL at 09:08

## 2020-10-21 RX ADMIN — OMEPRAZOLE 20 MG: 20 CAPSULE, DELAYED RELEASE ORAL at 09:08

## 2020-10-21 RX ADMIN — HYDROXYZINE HYDROCHLORIDE 10 MG: 10 TABLET, FILM COATED ORAL at 05:00

## 2020-10-21 RX ADMIN — ACETAMINOPHEN 975 MG: 325 TABLET, FILM COATED ORAL at 02:11

## 2020-10-21 RX ADMIN — OXYCODONE HYDROCHLORIDE 10 MG: 5 TABLET ORAL at 10:54

## 2020-10-21 RX ADMIN — DOCUSATE SODIUM 100 MG: 100 CAPSULE, LIQUID FILLED ORAL at 09:08

## 2020-10-21 RX ADMIN — CEFAZOLIN SODIUM 2 G: 2 INJECTION, SOLUTION INTRAVENOUS at 04:51

## 2020-10-21 RX ADMIN — ASPIRIN 325 MG: 325 TABLET, COATED ORAL at 09:08

## 2020-10-21 NOTE — PROGRESS NOTES
POD#1 R TKA    Doing well  Pain well managed  No CP, SOB, N, V  AVSS  CMS intact  Dressing CDI  XRay looks good  Hgb pending  PT/OT/Aspirin  Plan home today pending PT clearance and hospitalist okay  Follow-up with me in 2 weeks    Adán Zafar MD

## 2020-10-21 NOTE — CONSULTS
[facility]  Hospitalist Service Consultation  JoAnna K. Barthell, PA-C pager [phone]    Date of Admission:  10/20/2020  Date of Consult: 10/20/2020    Assessment & Plan   Geoffrey Benjamin is a 75 year old male with significant PMHx DM2, DLD, HTN, CAD s/p remote CABG who is admitted under the care of orthopedics for routine postoperative management following an elective right TKA (10/20/2020).  The hospitalist service consulted for medical comanagement.    Right knee OA s/p TKA (10/20/2020).  EBL 25.  Preop hemoglobin 16.6.  - Postoperative management per orthopedics.  - Hemovac in place.  - Saline lock LR IVF once tolerating PO.    CAD s/p 4v-CABG (2006).  HTN / DLD.  Established care with [facility] cardiology August 2020, prior to that followed through [place] [id number]. Reportedly normal LVEF.  [PTA: ASA 325mg, atorvastatin 10mg QHS, lisinopril 2.5mg, atenolol 25mg, HCTZ 25mg evenings]  - I&Os. Discontinue fluids once tolerating PO. Watch for evidence fluid overload. Euvolemic appearing on adm exam.  - Continues on PTA regimen with exception ACE inhibitor and HCTZ.  - Resume ASA and HCTZ pending bp and kidney functioning in AM.    DM2.  [PTA: empagliflozin 10mg, glimepiride 4mg, metformin 1500mg evenings, Lantus 20u QHS]  - Hold PO agents.  - Continue reduced dose Lantus 15u QHS.  - Monitor BG QID AC HS and medium SSI available.  - Mod carb diet.    Acne rosacea. Minocycline.    Hypothyroidism.  Levothyroxine.    GERD.  PPI.    BPH.  Tamsulosin.    DVT Prophylaxis: Pneumatic Compression Devices  Code Status: Full Code confirmed.    This patient was discussed with Dr. Herron of the Hospitalist Service who agrees with current plans as outlined above.    Disposition: discharge per ortho.    JoAnna K. Barthell, PA-C    Geoffrey Benjamin MRN# [id number]   YOB: 1945 Age: 75 year old   Primary Care Physician: Kike Ng [phone]    Requesting Physician: Adán Zafar  Reason  for Consult: Medical co-mngt    History provided by patient.    BRAD Benjamin is a 75 year old male with significant PMHx DM2, DLD, HTN, CAD s/p remote CABG who is admitted under the care of orthopedics for routine postoperative management following an elective right TKA (10/20/2020).  The hospitalist service consulted for medical comanagement.    Procedure performed under spinal anesthesia and peripheral nerve block.  Tolerated well.  EBL 25.    Postoperatively evaluated at bedside and denies chest pain, difficulty breathing, abdominal pain, nausea, vomiting.  Monitors blood sugars before breakfast daily and has been running in low 100 range.    PAST MEDICAL HISTORY  Past Medical History:   Diagnosis Date     Acne rosacea      Benign neoplasm of colon      Bilateral lower extremity edema      Conductive hearing loss      Coronary artery disease      Depression 1989    off medication since     Diverticulosis      Former smoker      Gastro-oesophageal reflux disease      Gout      Graves disease      Hyperlipidemia      Hypertension      Hypothyroidism      Impotence of organic origin      Kidney stone      Metabolic syndrome      Osteoarthritis     hands and back     Other and unspecified hyperlipidemia      PONV (postoperative nausea and vomiting)      Postsurgical aortocoronary bypass status      S/P CABG x 4 11/07/2006    LIMA to LAD and a diagonal branch artery sequentially as well as a SVG to the posterior branch of the right coronary artery and a free radial artery to the obtuse marginal branch arter     Type II or unspecified type diabetes mellitus without mention of complication, not stated as uncontrolled      Unspecified hypothyroidism        PAST SURGICAL HISTORY  Past Surgical History:   Procedure Laterality Date     ARTHROSCOPY KNEE      BILATERAL     BIOPSY      prostate     CORONARY ANGIOGRAPHY ADULT ORDER  11/2006     CORONARY ARTERY BYPASS  11/7/2006    LIMA to LAD and diagonal branch  arteries sequentially, SVG to PDA of RCA, and free radial artery to OM     CYSTOSCOPY       ENT SURGERY      septoplasty x2     EXTRACORPOREAL SHOCK WAVE LITHOTRIPSY, CYSTOSCOPY, INSERT STENT URETER(S), COMBINED  8/13/2013    Procedure: COMBINED EXTRACORPOREAL SHOCK WAVE LITHOTRIPSY, CYSTOSCOPY, INSERT STENT URETER(S);  CYSTOSCOPY, LEFT URETERAL STENT PLACEMENT, LEFT EXTRACORPOREAL SHOCK WAVE LITHOTRIPSY (ESWL)        EYE SURGERY Right     cataract     FOOT SURGERY      FUSION & TENDON REPAIR     HERNIA REPAIR       LASER HOLMIUM LITHOTRIPSY URETER(S), INSERT STENT, COMBINED Left 5/8/2018    Procedure: COMBINED CYSTOSCOPY, URETEROSCOPY, LASER HOLMIUM LITHOTRIPSY URETER(S), INSERT STENT;  1.  Video cystoscopy.    2.  Balloon dilation left ureter.    3.  Left ureteroscopy with holmium laser lithotripsy and stone extractions.    4.  Left double-J stent insertion (4.7 French x 24 cm).   5.  Exam under anesthesia.;  Surgeon: Alfonso Sinha MD;  Location:  OR     LASER HOLMIUM LITHOTRIPSY URETER(S), INSERT STENT, COMBINED Left 6/5/2018    Procedure: COMBINED CYSTOSCOPY, URETEROSCOPY, LASER HOLMIUM LITHOTRIPSY URETER(S), INSERT STENT;  1. Cystourethroscopy  2. Left flexible ureteroscopy with holmium laser lithotripsy and stone extraction  3. Left retrograde pyelography with interpretation of intraoperative fluoroscopic imaging  4. Left ureteral stent placement;  Surgeon: Romeo Cheng MD;  Location:  OR     ORTHOPEDIC SURGERY Left     subtailor fusion, tendon repair     SURGICAL HISTORY OF -   1988    Septoplasty at 13     SURGICAL HISTORY OF -   1/2005    Ureteral stent and lithotripsy for kidney stones     SURGICAL HISTORY OF -   8/2013    cataract extraction OD       HOME MEDICATIONS  Prior to Admission medications    Medication Sig Last Dose Taking? Auth Provider   acetaminophen (TYLENOL) 325 MG tablet Take 2 tablets (650 mg) by mouth every 4 hours as needed for other (mild pain)  Yes Andrade  Adán ABDALLA MD   aspirin (ASA) 325 MG EC tablet Take 1 tablet (325 mg) by mouth daily  Yes Adán Zafar MD   aspirin 325 MG tablet Take 325 mg by mouth every evening  10/19/2020 at PM Yes Reported, Patient   atenolol (TENORMIN) 25 MG tablet Take 25 mg by mouth daily  10/20/2020 at AM Yes Reported, Patient   atorvastatin (LIPITOR) 10 MG tablet Take 10 mg by mouth At Bedtime 10/19/2020 at HS Yes Reported, Patient   Carboxymethylcellul-Glycerin (REFRESH OPTIVE OP) Apply 1 drop to eye 2 times daily 10/20/2020 at Unknown time Yes Reported, Patient   empagliflozin (JARDIANCE) 10 MG TABS tablet Take 10 mg by mouth daily 10/19/2020 at AM Yes Reported, Patient   glimepiride (AMARYL) 4 MG tablet Take 4 mg by mouth every morning (before breakfast)  10/19/2020 at AM Yes Reported, Patient   Glucosamine-Chondroit-Vit C-Mn (GLUCOSAMINE CHONDROITIN 1500 COMPLEX) CAPS Take 1 tablet by mouth every morning  10/19/2020 at AM Yes Reported, Patient   hydrochlorothiazide (HYDRODIURIL) 25 MG tablet Take 25 mg by mouth every evening  10/19/2020 at PM Yes Reported, Patient   hydrOXYzine (ATARAX) 10 MG tablet Take 1 tablet (10 mg) by mouth every 6 hours as needed for itching or anxiety (with pain, moderate pain)  Yes Adán Zafar MD   insulin glargine (BASAGLAR KWIKPEN) 100 UNIT/ML pen Inject 20 Units Subcutaneous At Bedtime  10/19/2020 at HS Yes Reported, Patient   levothyroxine (SYNTHROID) 112 MCG tablet Take 112 mcg by mouth daily  10/20/2020 at AM Yes Reported, Patient   lisinopril (ZESTRIL) 2.5 MG tablet Take 2.5 mg by mouth daily  10/19/2020 at AM Yes Reported, Patient   magnesium hydroxide (MILK OF MAGNESIA) 400 MG/5ML suspension Take 15 mLs by mouth 2 times daily as needed for constipation or heartburn  Yes Adán Zafar MD   metFORMIN (GLUCOPHAGE) 500 MG tablet Take 1,500 mg by mouth every evening (takes 3 x 500mg) Past Week at PM Yes Reported, Patient   minocycline (DYNACIN) 100 MG tablet Take 100 mg by mouth daily   10/19/2020 at AM Yes Reported, Patient   multivitamin, therapeutic with minerals (MULTI-VITAMIN) TABS tablet Take 1 tablet by mouth daily 10/19/2020 at AM Yes Reported, Patient   Omeprazole (PRILOSEC PO) Take 20 mg by mouth every morning 10/20/2020 at AM Yes Reported, Patient   oxyCODONE (ROXICODONE) 5 MG tablet Take 1-2 tablets (5-10 mg) by mouth every 3 hours as needed for pain (Moderate to Severe)  Yes Adán Zafar MD   polyethylene glycol (MIRALAX) 17 g packet Take 17 g by mouth daily as needed for constipation  Yes Adán Zafar MD   pseudoePHEDrine (SUDAFED) 120 MG 12 hr tablet Take 120 mg by mouth every evening Past Week at PM Yes Reported, Patient   senna-docusate (SENOKOT-S/PERICOLACE) 8.6-50 MG tablet Take 1-2 tablets by mouth 2 times daily Take while on oral narcotics to prevent or treat constipation.  Yes Adán Zafar MD   tadalafil (CIALIS) 20 MG tablet Take 20 mg by mouth daily as needed  Past Week at PRN Yes Reported, Patient   tamsulosin (FLOMAX) 0.4 MG capsule Take 1 capsule (0.4 mg) by mouth daily  Patient taking differently: Take 0.4 mg by mouth every evening  10/19/2020 at PM Yes Alfonso Sinha MD   TESTOSTERONE AQUEOUS IM Inject 250 mg into the muscle every 21 days  Past Month at Unknown time Yes Reported, Patient   vitamin  B complex with vitamin C (VITAMIN  B COMPLEX) TABS Take 1 tablet by mouth daily 10/19/2020 at AM Yes Reported, Patient       ALLERGIES  Allergies   Allergen Reactions     Ezetimibe      Muscle aches     Hmg-Coa-R Inhibitors Nausea and Vomiting     Rosuvastatin Nausea and Vomiting     Simvastatin      Muscle aches       SOCIAL HISTORY  Quit smoking age 30. Less than 10 pack year history smoking. 1-2 alcoholic beverages per week.    FAMILY HISTORY  family history includes Coronary Artery Disease in his brother, brother, and mother; Family History Negative in his brother and brother; Heart Surgery in his brother and brother; Prostate Cancer in his  "father.    REVIEW OF SYSTEMS  A 10 point ROS was negative other than the symptoms noted above in the HPI.    PHYSICAL EXAM  Nursing Notes Reviewed.  /66   Pulse 68   Temp 97.6  F (36.4  C) (Oral)   Resp 15   Ht 1.676 m (5' 6\")   Wt 84.6 kg (186 lb 8 oz)   SpO2 93%   BMI 30.10 kg/m     General:  Appears stated age in no acute distress.  Skin:  Warm, dry. No rashes or lesions on exposed skin.  HEENT:  Normocephalic, atraumatic. EOMs grossly intact and PERRL. MMM.  Neck:  Supple.  Chest:  Breath sounds CTA. No increased work of breathing.  Cardiovascular:  RRR, no rub or murmur. No peripheral edema.  Abdomen:  Soft, non-tender, non-distended.  Musculoskeletal:  Moves all four extremities. RLE ACE wrapped and in knee immobilizer. Hemovac in place.  Neurological:  CN 2-12 grossly intact.    Data   Data reviewed today:  I personally reviewed no images or EKG's today.  Recent Labs   Lab 10/20/20  1011   POTASSIUM 4.0   CR 1.01       Imaging:  Recent Results (from the past 24 hour(s))   XR Knee Port Right 1/2 Views    Narrative    KNEE PORTABLE RIGHT ONE TO TWO VIEWS  10/20/2020 2:59 PM     HISTORY: Post-Op Total Knee.    COMPARISON: None.      Impression    IMPRESSION: Right total knee arthroplasty in place. Surgical drain in  place anteriorly. No evidence of complication.    SIGRID JIMÉNEZ MD       "

## 2020-10-21 NOTE — PROGRESS NOTES
10/21/20 0817   Quick Adds   Type of Visit Initial PT Evaluation   Living Environment   People in home spouse   Current Living Arrangements house  ([facility])   Home Accessibility stairs to enter home;stairs within home   Number of Stairs, Main Entrance 3   Stair Railings, Main Entrance railings on both sides of stairs  (can reach 1 side at a time)   Number of Stairs, Within Home, Primary   (flight to basement, does not need to access)   Stair Railings, Within Home, Primary railing on right side (ascending)   Living Environment Comments All needs met main level. Bathroom includes walk in shower with 3 grab bars & has shower chair with backrest & armrests bought for sx), comfort height toilet with toilet fram/ handles (bought for sx)   Self-Care   Usual Activity Tolerance good   Current Activity Tolerance moderate   Regular Exercise   (has been doing knee exercises daily x3 weeks)   Equipment Currently Used at Home   (has cane & walker, shower chair, toilet frame, RTS)   Disability/Function   Hearing Difficulty or Deaf   (uses hearing aids)   Walking or Climbing Stairs Difficulty no   Fall history within last six months no   General Information   Onset of Illness/Injury or Date of Surgery 10/20/20   Referring Physician Adán Zafar MD   Patient/Family Therapy Goals Statement (PT) to be able to walk and do stairs   Pertinent History of Current Problem (include personal factors and/or comorbidities that impact the POC) Pt is POD#1 R TKA. WBAT. PMHx DM2, DLD, HTN, CAD s/p remote CABG    Existing Precautions/Restrictions fall   Weight-Bearing Status - RLE weight-bearing as tolerated   General Observations Pt is pleasant & agreeable to PT.   Cognition   Orientation Status (Cognition) oriented x 4   Follows Commands (Cognition) over 90% accuracy;follows multi-step commands   Pain Assessment   Patient Currently in Pain Yes, see Vital Sign flowsheet  (2-3/10 (had meds ~ 2hrs ago which helped))   Integumentary/Edema    Integumentary/Edema Comments R knee wrapped, dressing not visualized   Posture    Posture Forward head position;Protracted shoulders   Range of Motion (ROM)   ROM Quick Adds ROM deficits secondary to surgical procedure;Knee, Right   ROM Comment R knee ROM limited; all other ROM WFL   Right Knee Extension/Flexion ROM   Right Knee Extension/Flexion PROM - degrees 0-22-85   Strength   Strength Comments Strength not formally assessed secondary to post op status and pain. Pt demos SLR R LE independent, does appear to have some post op weakness with mobility/ muscle fatigue with mobility   Bed Mobility   Bed Mobility supine-sit;sit-supine   Comment (Bed Mobility) IND   Transfers   Transfers sit-stand transfer   Sit-Stand Transfer   Sit/Stand Transfer Comments CGA sit>stand with FWW from EOB, pt tends to have decreased/minimal WB R LE in standing initially   Gait/Stairs (Locomotion)   Distance in Feet (Required for LE Total Joints) Pt initially stands at EOB with minimal WB through R LE. Edu on WBAT. worked on weight shifting- pt able to bear full weight through R LE and lift L LE off floor. Ambulated 150' with FWW and SBA-S, progressing from partial step through to step through pattern. Pt initially stepping to crossbar of walker, edu on stepping into middle of walker- improved with cues and practice. Stair training 1x3 steps with B rails and CGA, 1x3 steps with R rail and cane and SBA, with min cues for sequencing, discussed rec to have spouse close when doing stairs initially at home, pt agreeable. In room, edu/ demo technique for curb with walker.   Comment (Gait/Stairs) Ambulated in room with FWW and CGA-SBA with short step length, antalgic R with decreased WB R LE and increased reliance on UE support in R stance   Balance   Balance Comments Needs B UE support on walker for safe dynamic mobility   Sensory Examination   Sensory Perception Comments denies numbness/tingling   Clinical Impression   Criteria for Skilled  "Therapeutic Intervention yes, treatment indicated   PT Diagnosis (PT) difficulty ambulating   Influenced by the following impairments pain, decreased strength, decreased activity tolerance, decreased ROM R knee, impaired balance   Functional limitations due to impairments decreased independence with transfers, ambulation, stairs   Clinical Presentation Stable/Uncomplicated   Clinical Presentation Rationale clinical judgement   Clinical Decision Making (Complexity) low complexity   Therapy Frequency (PT) One time eval and treatment only   Predicted Duration of Therapy Intervention (days/wks) 1 day   Planned Therapy Interventions (PT) balance training;gait training;patient/family education;ROM (range of motion);stair training;strengthening;transfer training   Anticipated Equipment Needs at Discharge (PT)   (none; pt has walker, cane, bathroom DME)   Risk & Benefits of therapy have been explained evaluation/treatment results reviewed;care plan/treatment goals reviewed;risks/benefits reviewed;current/potential barriers reviewed;participants voiced agreement with care plan;participants included;patient   Clinical Impression Comments Patient is mobilizing well POD #1, anticipate with gait training, stair training, pt education & HEP instruction, pt will meet goals within session.   PT Discharge Planning    PT Rationale for DC Rec Defer to surgeon/PA. Pt met IP PT goals within session. IND bed mobility, Mod ind transfers with walker, SBA ambulation with walker, SBA stairs with 1 rail and cane, SBA HEP exercises   PT Brief overview of current status  Pt met goals within session. SBA ambulation and stairs. No PM session needed.   [facility] Vocab-[employer] TM \"6 Clicks\"   2016, Trustees of [facility], under license to [employer].  All rights reserved.   6 Clicks Short Forms Basic Mobility Inpatient Short Form   [facility] AM-PAC  \"6 Clicks\" V.2 Basic Mobility Inpatient Short Form   1. Turning from your back to " your side while in a flat bed without using bedrails? 4 - None   2. Moving from lying on your back to sitting on the side of a flat bed without using bedrails? 4 - None   3. Moving to and from a bed to a chair (including a wheelchair)? 4 - None   4. Standing up from a chair using your arms (e.g., wheelchair, or bedside chair)? 4 - None   5. To walk in hospital room? 3 - A Little   6. Climbing 3-5 steps with a railing? 3 - A Little   Basic Mobility Raw Score (Score out of 24.Lower scores equate to lower levels of function) 22   Total Evaluation Time   Total Evaluation Time (Minutes) 8

## 2020-10-21 NOTE — PLAN OF CARE
Patient vital signs are at baseline: Yes  Patient able to ambulate as they were prior to admission or with assist devices provided by therapies during their stay:  Yes  Patient MUST void prior to discharge:  Yes  Patient able to tolerate oral intake:  Yes  Pain has adequate pain control using Oral analgesics:  Yes     Dressing Changed.  Pain managed with oxy and atarax.  Discharge instruction went over with pt and wife, verbalized understanding.  discharge meds given to pt's wife. Will discharge home after OT.

## 2020-10-21 NOTE — PLAN OF CARE
Patient vital signs are at baseline: Yes  Patient able to ambulate as they were prior to admission or with assist devices provided by therapies during their stay:  Yes  Patient MUST void prior to discharge:  Yes  Patient able to tolerate oral intake:  Yes  Pain has adequate pain control using Oral analgesics:  Yes    VSS on RA. Capno monitoring in place. CMS intact. Dressing c/d/I. Will continue to monitor.

## 2020-10-21 NOTE — PROGRESS NOTES
Patient vital signs are at baseline: yes  Patient able to ambulate as they were prior to admission or with assist devices provided by therapies during their stay:  no  Patient MUST void prior to discharge:  yes  Patient able to tolerate oral intake:  yes  Pain has adequate pain control using Oral analgesics:  Yes    Pt denies pain.  Knee drsg dry and intact.  Voiding.  Pt normally has (L) toes are numb from previous surgery.  Denies nausea.  Not out of bed yet as he still has sl numbness and tingling.

## 2020-10-21 NOTE — PLAN OF CARE
OT: Orders received. Chart reviewed and discussed with care team.  OT not indicated as patient and spouse do not indicate OT needs.  Discussed OT need with patient/spouse, spouse has already assisted with dressing.  Patient and spouse report no difficulties with toilet task while inpatient, has grab bars and higher toilet at home.  Patient has walk-in shower with shower chair and grab bars at home.  No further questions/concerns.  Defer discharge recommendations to PT.  Will complete orders.

## 2020-10-21 NOTE — PROGRESS NOTES
[facility]    Medicine Progress Note - Hospitalist Service       Date of Admission:  10/20/2020    Assessment & Plan       Geoffrey Benjamin is a 75 year old male with significant PMHx DM2, DLD, HTN, CAD s/p remote CABG who is admitted under the care of orthopedics for routine postoperative management following an elective right TKA (10/20/2020).  The hospitalist service consulted for medical comanagement.     Right knee OA s/p TKA (10/20/2020)  EBL 25.  Hgb 16.6--14.1.  - Postoperative management per orthopedics     CAD s/p 4v-CABG (2006)  HTN / DLD  Established care with [facility] cardiology August 2020, prior to that followed through [place] [id number]. Reportedly normal LVEF.  [PTA: ASA 325mg, atorvastatin 10mg QHS, lisinopril 2.5mg, atenolol 25mg, HCTZ 25mg evenings]  - Continues on PTA regimen with exception ACE inhibitor and HCTZ.  - Resume HCTZ at discharge  - PTA ASA resumed per primary service     DM2  [PTA: empagliflozin 10mg, glimepiride 4mg, metformin 1500mg evenings, Lantus 20u QHS]  - Hold PO agents, resume at discharge  - Continue reduced dose Lantus 15u at bedtime, resume 20u home dose at discharge  - Monitor BG QID AC HS and medium SSI available  - Mod carb diet     Acne rosacea. Minocycline.     Hypothyroidism.  Levothyroxine.     GERD.  PPI.     BPH.  Tamsulosin.       Diet: Regular Diet Adult  Advance Diet as Tolerated: Regular Diet Adult; 7883-0421 Calories: Moderate Consistent CHO (4-6 CHO units/meal)    DVT Prophylaxis: Defer to primary service  Schmitz Catheter: not present  Code Status: Full Code           Disposition Plan   Expected discharge: Today, per primary service  Entered: Tyree James PA-C 10/21/2020, 11:17 AM       The patient's care was discussed with the Attending Physician, Dr. Alford, Bedside Nurse and Patient.    Tyree James PA-C  Hospitalist Service  [facility]  Contact information available via [facility]  Paging/Directory    ______________________________________________________________________    Interval History   Pt seen & evaluated. Sitting up in chair at bedside. Is having some pain following PT session this morning, tolerable with regimen currently ordered. Denies cp, sob. Tolerating oral intake. Planning discharge this afternoon, no acute concerns.    Data reviewed today: I reviewed all medications, new labs and imaging results over the last 24 hours. I personally reviewed no images or EKG's today.    Physical Exam   Vital Signs: Temp: 98.4  F (36.9  C) Temp src: Oral BP: 111/62 Pulse: 82   Resp: 16 SpO2: 92 % O2 Device: None (Room air) Oxygen Delivery: 2 LPM  Weight: 186 lbs 8 oz  GEN: well-developed, well-nourished, appears comfortable  HEENT: NCAT, EOM intact bilaterally, sclera clear, conjunctiva normal, nose & mouth patent, mucous membranes moist  CHEST: lungs CTA bilaterally, no increased work of breathing, no wheeze, crackles, rhonchi  HEART: RRR, S1 & S2, no murmur  ABD: soft, nontender, nondistended, no guarding or rigidity, +BS in all 4 quadrants  MSK: AROM bilateral UE; R knee with dressing in place, scant drainage, pedal & radial pulses 2+ bilaterally  NEURO: awake, alert, oriented to name, place, and time. CN II-XII grossly intact. Sensation grossly intact to light touch.   SKIN: warm & dry without rash, no pedal edema    Data   Recent Labs   Lab 10/21/20  0624 10/20/20  1011   HGB 14.1  --      --    POTASSIUM 3.9 4.0   CHLORIDE 106  --    CO2 28  --    BUN 14  --    CR 0.93 1.01   ANIONGAP 5  --    RULA 8.3*  --    GLC 85  --      Recent Results (from the past 24 hour(s))   XR Knee Port Right 1/2 Views    Narrative    KNEE PORTABLE RIGHT ONE TO TWO VIEWS  10/20/2020 2:59 PM     HISTORY: Post-Op Total Knee.    COMPARISON: None.      Impression    IMPRESSION: Right total knee arthroplasty in place. Surgical drain in  place anteriorly. No evidence of complication.    SIGRID JIMÉNEZ MD      Medications     lactated ringers         acetaminophen  975 mg Oral Q8H     aspirin  325 mg Oral Daily     atenolol  25 mg Oral Daily     atorvastatin  10 mg Oral At Bedtime     docusate sodium  100 mg Oral BID     insulin aspart  1-7 Units Subcutaneous TID AC     insulin aspart  1-5 Units Subcutaneous At Bedtime     insulin glargine  15 Units Subcutaneous At Bedtime     levothyroxine  112 mcg Oral QAM AC     minocycline  100 mg Oral Daily     omeprazole  20 mg Oral QAM     polyethylene glycol  17 g Oral Daily     senna-docusate  1 tablet Oral BID     sodium chloride (PF)  3 mL Intracatheter Q8H     tamsulosin  0.4 mg Oral QPM

## 2020-10-22 NOTE — DISCHARGE SUMMARY
Discharge Summary    Geoffrey Benjamin MRN# [id number]   YOB: 1945 Age: 75 year old     Date of Admission:  10/20/2020  Date of Discharge:  10/21/20  Admitting Physician:  Adán Zafar MD  Discharge Physician:  Adán Zafar MD     Primary Provider: Kike Ng          Admission Diagnoses:   Osteoarthritis right knee          Discharge Diagnosis:   Same           Surgical Procedure:   Total knee arthroplasty           Discharge Disposition:   Discharged to home           Medications Prior to Admission:     No medications prior to admission.             Discharge Medications:     Discharge Medication List as of 10/21/2020 11:01 AM      START taking these medications    Details   acetaminophen (TYLENOL) 325 MG tablet Take 2 tablets (650 mg) by mouth every 4 hours as needed for other (mild pain), Disp-100 tablet, R-0, E-Prescribe      aspirin (ASA) 325 MG EC tablet Take 1 tablet (325 mg) by mouth daily, Disp-30 tablet, R-0, E-Prescribe      hydrOXYzine (ATARAX) 10 MG tablet Take 1 tablet (10 mg) by mouth every 6 hours as needed for itching or anxiety (with pain, moderate pain), Disp-30 tablet, R-0, E-Prescribe      magnesium hydroxide (MILK OF MAGNESIA) 400 MG/5ML suspension Take 15 mLs by mouth 2 times daily as needed for constipation or heartburn, Disp-355 mL, R-0, E-Prescribe      oxyCODONE (ROXICODONE) 5 MG tablet Take 1-2 tablets (5-10 mg) by mouth every 3 hours as needed for pain (Moderate to Severe), Disp-50 tablet, R-0, Local Print      polyethylene glycol (MIRALAX) 17 g packet Take 17 g by mouth daily as needed for constipation, Disp-7 packet, R-0, E-Prescribe      senna-docusate (SENOKOT-S/PERICOLACE) 8.6-50 MG tablet Take 1-2 tablets by mouth 2 times daily Take while on oral narcotics to prevent or treat constipation., Disp-30 tablet, R-0, E-PrescribeWhile taking narcotics         CONTINUE these medications which have NOT CHANGED    Details   atenolol (TENORMIN) 25 MG tablet  Take 25 mg by mouth daily , Historical      atorvastatin (LIPITOR) 10 MG tablet Take 10 mg by mouth At Bedtime, Historical      Carboxymethylcellul-Glycerin (REFRESH OPTIVE OP) Apply 1 drop to eye 2 times daily, Historical      empagliflozin (JARDIANCE) 10 MG TABS tablet Take 10 mg by mouth daily, Historical      glimepiride (AMARYL) 4 MG tablet Take 4 mg by mouth every morning (before breakfast) , Historical      Glucosamine-Chondroit-Vit C-Mn (GLUCOSAMINE CHONDROITIN 1500 COMPLEX) CAPS Take 1 tablet by mouth every morning , Historical      hydrochlorothiazide (HYDRODIURIL) 25 MG tablet Take 25 mg by mouth every evening , Historical      insulin glargine (BASAGLAR KWIKPEN) 100 UNIT/ML pen Inject 20 Units Subcutaneous At Bedtime , HistoricalIf Basaglar is not covered by insurance, may substitute Lantus at same dose and frequency.        levothyroxine (SYNTHROID) 112 MCG tablet Take 112 mcg by mouth daily , Historical      lisinopril (ZESTRIL) 2.5 MG tablet Take 2.5 mg by mouth daily , Historical      metFORMIN (GLUCOPHAGE) 500 MG tablet Take 1,500 mg by mouth every evening (takes 3 x 500mg), Historical      minocycline (DYNACIN) 100 MG tablet Take 100 mg by mouth daily , Historical      multivitamin, therapeutic with minerals (MULTI-VITAMIN) TABS tablet Take 1 tablet by mouth daily, Historical      Omeprazole (PRILOSEC PO) Take 20 mg by mouth every morning, Historical      pseudoePHEDrine (SUDAFED) 120 MG 12 hr tablet Take 120 mg by mouth every evening, Historical      tamsulosin (FLOMAX) 0.4 MG capsule Take 1 capsule (0.4 mg) by mouth daily, Disp-90 capsule, R-2, E-Prescribe      TESTOSTERONE AQUEOUS IM Inject 250 mg into the muscle every 21 days , Historical      vitamin  B complex with vitamin C (VITAMIN  B COMPLEX) TABS Take 1 tablet by mouth daily, Historical         STOP taking these medications       aspirin 325 MG tablet Comments:   Reason for Stopping:         tadalafil (CIALIS) 20 MG tablet Comments:    Reason for Stopping:                     Consultations:   No consultations were requested during this admission           Hospital Course:   The patient was admitted after the surgical procedure.The patient underwent an uneventful total knee arthroplasty. Postoperatively, anticoagulation with aspirin was initiated. Home medications have been reconciled. Oxycodone 5 mg was prescribed for pain.             Discharge Instructions and Follow-Up:        Discharge activity: WBAT with ambulatory device.   Discharge follow-up: Follow-up with Ritu Coello PA-C in ~14 days post-op. [phone]   Outpatient therapy: Should already be arranged by office.  If not, patient should call to schedule 2x/week x 3 weeks.   Home Care agency: None   Supplies and equipment: None        Wound care: Daily dry dressing changes.  May use adaptic/xeroform directly over incision if available.  Staples out 12 days post-op and apply steri-strips.    Other instructions: Knee immobilizer on at night for 1 month.  Please discharge patient with immobilizer.       Ritu Coello PA-C

## 2020-10-27 ENCOUNTER — DOCUMENTATION ONLY (OUTPATIENT)
Dept: OTHER | Facility: CLINIC | Age: 75
End: 2020-10-27

## 2020-12-09 ENCOUNTER — DOCUMENTATION ONLY (OUTPATIENT)
Dept: CARDIOLOGY | Facility: CLINIC | Age: 75
End: 2020-12-09

## 2020-12-09 NOTE — PROGRESS NOTES
Received a refill request from [facility] [place] for [facility]vastatin. [facility] All refill to be forwarded there.

## 2021-01-05 ENCOUNTER — HOSPITAL ENCOUNTER (OUTPATIENT)
Facility: CLINIC | Age: 76
Setting detail: OBSERVATION
Discharge: HOME | End: 2021-01-06
Attending: INTERNAL MEDICINE | Admitting: HOSPITALIST
Payer: MEDICARE

## 2021-01-05 ENCOUNTER — TRANSFERRED RECORDS (OUTPATIENT)
Dept: HEALTH INFORMATION MANAGEMENT | Facility: CLINIC | Age: 76
End: 2021-01-05

## 2021-01-05 ENCOUNTER — APPOINTMENT (OUTPATIENT)
Dept: CARDIOLOGY | Facility: CLINIC | Age: 76
End: 2021-01-05
Attending: PHYSICIAN ASSISTANT
Payer: MEDICARE

## 2021-01-05 DIAGNOSIS — I25.10 CORONARY ARTERY DISEASE INVOLVING NATIVE CORONARY ARTERY OF NATIVE HEART WITHOUT ANGINA PECTORIS: Primary | ICD-10-CM

## 2021-01-05 PROBLEM — R55 SYNCOPE: Status: ACTIVE | Noted: 2021-01-05

## 2021-01-05 LAB
ALT SERPL-CCNC: 24 U/L (ref 14–63)
AST SERPL-CCNC: 20 U/L (ref 15–37)
CREAT SERPL-MCNC: 0.81 MG/DL (ref 0.67–1.17)
GFR SERPL CREATININE-BSD FRML MDRD: >60 ML/MIN/1.73ME2 (ref 60–150)
GLUCOSE BLDC GLUCOMTR-MCNC: 185 MG/DL (ref 70–99)
GLUCOSE BLDC GLUCOMTR-MCNC: 187 MG/DL (ref 70–99)
GLUCOSE SERPL-MCNC: 174 MG/DL (ref 74–100)
POTASSIUM SERPL-SCNC: 3.2 MMOL/L (ref 3.5–5.1)
TROPONIN I SERPL-MCNC: <0.015 UG/L (ref 0–0.04)
TROPONIN I SERPL-MCNC: <0.015 UG/L (ref 0–0.04)

## 2021-01-05 PROCEDURE — 999N001017 HC STATISTIC GLUCOSE BY METER IP

## 2021-01-05 PROCEDURE — 99220 PR INITIAL OBSERVATION CARE,LEVEL III: CPT | Performed by: PHYSICIAN ASSISTANT

## 2021-01-05 PROCEDURE — 84484 ASSAY OF TROPONIN QUANT: CPT | Mod: 91 | Performed by: PHYSICIAN ASSISTANT

## 2021-01-05 PROCEDURE — 36415 COLL VENOUS BLD VENIPUNCTURE: CPT | Performed by: PHYSICIAN ASSISTANT

## 2021-01-05 PROCEDURE — G0378 HOSPITAL OBSERVATION PER HR: HCPCS

## 2021-01-05 PROCEDURE — 999N000208 ECHOCARDIOGRAM COMPLETE

## 2021-01-05 PROCEDURE — 250N000012 HC RX MED GY IP 250 OP 636 PS 637: Performed by: PHYSICIAN ASSISTANT

## 2021-01-05 PROCEDURE — 255N000002 HC RX 255 OP 636: Performed by: INTERNAL MEDICINE

## 2021-01-05 PROCEDURE — 250N000013 HC RX MED GY IP 250 OP 250 PS 637: Performed by: PHYSICIAN ASSISTANT

## 2021-01-05 PROCEDURE — 250N000012 HC RX MED GY IP 250 OP 636 PS 637: Performed by: INTERNAL MEDICINE

## 2021-01-05 PROCEDURE — 96372 THER/PROPH/DIAG INJ SC/IM: CPT | Mod: XS | Performed by: PHYSICIAN ASSISTANT

## 2021-01-05 PROCEDURE — 93306 TTE W/DOPPLER COMPLETE: CPT | Mod: 26 | Performed by: INTERNAL MEDICINE

## 2021-01-05 PROCEDURE — 258N000003 HC RX IP 258 OP 636: Performed by: PHYSICIAN ASSISTANT

## 2021-01-05 PROCEDURE — 96372 THER/PROPH/DIAG INJ SC/IM: CPT | Mod: XS | Performed by: INTERNAL MEDICINE

## 2021-01-05 RX ORDER — PROCHLORPERAZINE MALEATE 5 MG
5 TABLET ORAL EVERY 6 HOURS PRN
Status: DISCONTINUED | OUTPATIENT
Start: 2021-01-05 | End: 2021-01-06 | Stop reason: HOSPADM

## 2021-01-05 RX ORDER — AMOXICILLIN 250 MG
1 CAPSULE ORAL 2 TIMES DAILY PRN
Status: DISCONTINUED | OUTPATIENT
Start: 2021-01-05 | End: 2021-01-06 | Stop reason: HOSPADM

## 2021-01-05 RX ORDER — LISINOPRIL 2.5 MG/1
2.5 TABLET ORAL DAILY
Status: DISCONTINUED | OUTPATIENT
Start: 2021-01-06 | End: 2021-01-06 | Stop reason: HOSPADM

## 2021-01-05 RX ORDER — GLIMEPIRIDE 2 MG/1
4 TABLET ORAL
Status: DISCONTINUED | OUTPATIENT
Start: 2021-01-06 | End: 2021-01-06 | Stop reason: HOSPADM

## 2021-01-05 RX ORDER — ATENOLOL 25 MG/1
25 TABLET ORAL DAILY
Status: DISCONTINUED | OUTPATIENT
Start: 2021-01-06 | End: 2021-01-06

## 2021-01-05 RX ORDER — ACETAMINOPHEN 650 MG/1
650 SUPPOSITORY RECTAL EVERY 4 HOURS PRN
Status: DISCONTINUED | OUTPATIENT
Start: 2021-01-05 | End: 2021-01-06 | Stop reason: HOSPADM

## 2021-01-05 RX ORDER — AMOXICILLIN 250 MG
2 CAPSULE ORAL 2 TIMES DAILY PRN
Status: DISCONTINUED | OUTPATIENT
Start: 2021-01-05 | End: 2021-01-06 | Stop reason: HOSPADM

## 2021-01-05 RX ORDER — PROCHLORPERAZINE 25 MG
12.5 SUPPOSITORY, RECTAL RECTAL EVERY 12 HOURS PRN
Status: DISCONTINUED | OUTPATIENT
Start: 2021-01-05 | End: 2021-01-06 | Stop reason: HOSPADM

## 2021-01-05 RX ORDER — NICOTINE POLACRILEX 4 MG
15-30 LOZENGE BUCCAL
Status: DISCONTINUED | OUTPATIENT
Start: 2021-01-05 | End: 2021-01-06 | Stop reason: HOSPADM

## 2021-01-05 RX ORDER — INSULIN GLARGINE 100 [IU]/ML
20 INJECTION, SOLUTION SUBCUTANEOUS AT BEDTIME
Status: DISCONTINUED | OUTPATIENT
Start: 2021-01-05 | End: 2021-01-05

## 2021-01-05 RX ORDER — ONDANSETRON 2 MG/ML
4 INJECTION INTRAMUSCULAR; INTRAVENOUS EVERY 6 HOURS PRN
Status: DISCONTINUED | OUTPATIENT
Start: 2021-01-05 | End: 2021-01-06 | Stop reason: HOSPADM

## 2021-01-05 RX ORDER — HYDROXYZINE HYDROCHLORIDE 10 MG/1
10 TABLET, FILM COATED ORAL EVERY 6 HOURS PRN
Status: DISCONTINUED | OUTPATIENT
Start: 2021-01-05 | End: 2021-01-05

## 2021-01-05 RX ORDER — LEVOTHYROXINE SODIUM 112 UG/1
112 TABLET ORAL DAILY
Status: DISCONTINUED | OUTPATIENT
Start: 2021-01-06 | End: 2021-01-06 | Stop reason: HOSPADM

## 2021-01-05 RX ORDER — ACETAMINOPHEN 325 MG/1
650 TABLET ORAL EVERY 4 HOURS PRN
Status: DISCONTINUED | OUTPATIENT
Start: 2021-01-05 | End: 2021-01-06 | Stop reason: HOSPADM

## 2021-01-05 RX ORDER — LIDOCAINE 40 MG/G
CREAM TOPICAL
Status: DISCONTINUED | OUTPATIENT
Start: 2021-01-05 | End: 2021-01-06 | Stop reason: HOSPADM

## 2021-01-05 RX ORDER — DEXTROSE MONOHYDRATE 25 G/50ML
25-50 INJECTION, SOLUTION INTRAVENOUS
Status: DISCONTINUED | OUTPATIENT
Start: 2021-01-05 | End: 2021-01-06 | Stop reason: HOSPADM

## 2021-01-05 RX ORDER — CHLORAL HYDRATE 500 MG
1 CAPSULE ORAL DAILY
COMMUNITY

## 2021-01-05 RX ORDER — ONDANSETRON 4 MG/1
4 TABLET, ORALLY DISINTEGRATING ORAL EVERY 6 HOURS PRN
Status: DISCONTINUED | OUTPATIENT
Start: 2021-01-05 | End: 2021-01-06 | Stop reason: HOSPADM

## 2021-01-05 RX ORDER — AMPICILLIN TRIHYDRATE 250 MG
1 CAPSULE ORAL AT BEDTIME
COMMUNITY

## 2021-01-05 RX ORDER — ATORVASTATIN CALCIUM 10 MG/1
10 TABLET, FILM COATED ORAL AT BEDTIME
Status: DISCONTINUED | OUTPATIENT
Start: 2021-01-05 | End: 2021-01-06 | Stop reason: HOSPADM

## 2021-01-05 RX ORDER — SODIUM CHLORIDE 9 MG/ML
INJECTION, SOLUTION INTRAVENOUS CONTINUOUS
Status: DISCONTINUED | OUTPATIENT
Start: 2021-01-05 | End: 2021-01-06 | Stop reason: HOSPADM

## 2021-01-05 RX ADMIN — SODIUM CHLORIDE: 9 INJECTION, SOLUTION INTRAVENOUS at 21:38

## 2021-01-05 RX ADMIN — INSULIN GLARGINE 20 UNITS: 100 INJECTION, SOLUTION SUBCUTANEOUS at 21:33

## 2021-01-05 RX ADMIN — HUMAN ALBUMIN MICROSPHERES AND PERFLUTREN 9 ML: 10; .22 INJECTION, SOLUTION INTRAVENOUS at 14:31

## 2021-01-05 RX ADMIN — ASPIRIN 325 MG: 325 TABLET, COATED ORAL at 21:33

## 2021-01-05 RX ADMIN — SODIUM CHLORIDE: 9 INJECTION, SOLUTION INTRAVENOUS at 12:10

## 2021-01-05 RX ADMIN — INSULIN ASPART 1 UNITS: 100 INJECTION, SOLUTION INTRAVENOUS; SUBCUTANEOUS at 17:59

## 2021-01-05 RX ADMIN — ATORVASTATIN CALCIUM 10 MG: 10 TABLET, FILM COATED ORAL at 21:33

## 2021-01-05 ASSESSMENT — MIFFLIN-ST. JEOR: SCORE: 1521.44

## 2021-01-05 NOTE — H&P
Admitted:     01/05/2021      PRIMARY CARE PHYSICIAN:  Kike Ng MD      CHIEF COMPLAINT:  Syncope.      HISTORY OF PRESENT ILLNESS:  Geoffrey Benjamin is a very pleasant 75-year-old male with a past medical history of coronary artery disease, status post CABG, hypertension and type 2 diabetes who presented to the Emergency Department at [facility] this morning after a syncopal episode.  The patient reports that he had a right total knee arthroplasty 10/20/2020 and has been recovering well from this.  He reports being off of pain medications recently, but last night in the early morning developed increased pain.  He states he took 2 tramadol and also states he took an aspirin, as he was having some mild left-sided chest pain.  He went back to bed, but later in the morning woke up and sat on the edge of the bed and began to feel dizzy.  This got worse after standing and he got up to go to the bathroom.  He fell in the bathroom and landed on the chair.  His wife found him and alerted EMS.  Upon EMS arrival, BP was low in the 90s.  He states that he regained consciousness fairly quickly and denies any confusion.  He denies any precipitating chest pain, palpitations, shortness of breath, fever, chills, headache, focal weakness, numbness, tingling.  He denies any recent decreased appetite, nausea, vomiting, diarrhea, dysuria.  He has a small abrasion to the posterior head and small abrasion to the left posterior elbow, but did not sustain any other injuries.      In the Emergency Department, the patient's noncontrast CT of the head was negative for any acute abnormalities.  CT of the cervical spine does not show any fracture, dislocation or destructive process.  His D-dimer was checked and elevated.  Ultrasound of the right lower extremity was performed, which was negative for DVT.  CT chest, PE scan was negative for PE, aortic dissection or acute cardiopulmonary disease.  There is mild dependent  atelectasis.  His knee was x-rayed that shows stable right total knee arthroplasty without evidence of complication or osseous abnormality.  Lab work is notable for potassium of 3.2.  Creatinine is 0.81.  Glucose is elevated at 174.  His influenza swab is negative and COVID-19 swab is negative.  Troponin is undetectable.  EKG shows NSR with possible partial bundle branch block, unchanged from previous.   The patient was transferred to [facility] for observation for workup and treatment of syncope.      PAST MEDICAL HISTORY:   1.  Type 2 diabetes.   2.  Hypothyroidism.   3.  Acne rosacea.   4.  GERD.   5.  BPH.   6.  Coronary artery disease, status post 4-vessel CABG in 2006.   7.  Hypertension.   8.  Dyslipidemia.   9.  Vasovagal syncope.      PAST SURGICAL HISTORY:    Past Surgical History:   Procedure Laterality Date     ARTHROPLASTY KNEE Right 10/20/2020    Procedure: RIGHT TOTAL KNEE ARTHROPLASTY;  Surgeon: Adán Zafar MD;  Location: [facility] OR     ARTHROSCOPY KNEE      BILATERAL     BIOPSY      prostate     CORONARY ANGIOGRAPHY ADULT ORDER  11/2006     CORONARY ARTERY BYPASS  11/7/2006    LIMA to LAD and diagonal branch arteries sequentially, SVG to PDA of RCA, and free radial artery to OM     CYSTOSCOPY       ENT SURGERY      septoplasty x2     EXTRACORPOREAL SHOCK WAVE LITHOTRIPSY, CYSTOSCOPY, INSERT STENT URETER(S), COMBINED  8/13/2013    Procedure: COMBINED EXTRACORPOREAL SHOCK WAVE LITHOTRIPSY, CYSTOSCOPY, INSERT STENT URETER(S);  CYSTOSCOPY, LEFT URETERAL STENT PLACEMENT, LEFT EXTRACORPOREAL SHOCK WAVE LITHOTRIPSY (ESWL)        EYE SURGERY Right     cataract     FOOT SURGERY      FUSION & TENDON REPAIR     HERNIA REPAIR       LASER HOLMIUM LITHOTRIPSY URETER(S), INSERT STENT, COMBINED Left 5/8/2018    Procedure: COMBINED CYSTOSCOPY, URETEROSCOPY, LASER HOLMIUM LITHOTRIPSY URETER(S), INSERT STENT;  1.  Video cystoscopy.    2.  Balloon dilation left ureter.    3.  Left ureteroscopy with  holmium laser lithotripsy and stone extractions.    4.  Left double-J stent insertion (4.7 French x 24 cm).   5.  Exam under anesthesia.;  Surgeon: Alfonso Sinha MD;  Location: [facility] OR     LASER HOLMIUM LITHOTRIPSY URETER(S), INSERT STENT, COMBINED Left 6/5/2018    Procedure: COMBINED CYSTOSCOPY, URETEROSCOPY, LASER HOLMIUM LITHOTRIPSY URETER(S), INSERT STENT;  1. Cystourethroscopy  2. Left flexible ureteroscopy with holmium laser lithotripsy and stone extraction  3. Left retrograde pyelography with interpretation of intraoperative fluoroscopic imaging  4. Left ureteral stent placement;  Surgeon: Romeo Cheng MD;  Location: [facility] OR     ORTHOPEDIC SURGERY Left     subtailor fusion, tendon repair     SURGICAL HISTORY OF -   1988    Septoplasty at 13     SURGICAL HISTORY OF -   1/2005    Ureteral stent and lithotripsy for kidney stones     SURGICAL HISTORY OF -   8/2013    cataract extraction OD     FAMILY HISTORY:  Mother with coronary artery disease.  Father with prostate cancer.  Brother with coronary artery disease and CABG x3 at age 55.  Second brother with coronary artery disease and CABG x2 at age 64.      SOCIAL HISTORY:  The patient is a former smoker and quit in 1972.  He drinks 1-2 alcoholic beverages a week.  He denies any illicit drug use.  He is .      PRIOR TO ADMISSION MEDICATIONS:    Medications Prior to Admission   Medication Sig Dispense Refill Last Dose     acetaminophen (TYLENOL) 325 MG tablet Take 2 tablets (650 mg) by mouth every 4 hours as needed for other (mild pain) 100 tablet 0 1/4/2021 at hs     aspirin (ASA) 325 MG EC tablet Take 1 tablet (325 mg) by mouth daily 30 tablet 0 1/4/2021 at hs     atenolol (TENORMIN) 25 MG tablet Take 25 mg by mouth daily    1/4/2021 at am     atorvastatin (LIPITOR) 10 MG tablet Take 10 mg by mouth At Bedtime   1/4/2021 at hs     Carboxymethylcellul-Glycerin (REFRESH OPTIVE OP) Apply 1 drop to eye 2 times daily   1/4/2021 at hs      Coenzyme Q10 (COQ10) 200 MG CAPS Take 200-300 mg by mouth At Bedtime   1/4/2021 at hs     empagliflozin (JARDIANCE) 10 MG TABS tablet Take 10 mg by mouth daily   1/4/2021 at am     fish oil-omega-3 fatty acids 1000 MG capsule Take 1 g by mouth daily   1/4/2021 at am     glimepiride (AMARYL) 4 MG tablet Take 4 mg by mouth every morning (before breakfast)    1/4/2021 at am     Glucosamine-Chondroit-Vit C-Mn (GLUCOSAMINE CHONDROITIN 1500 COMPLEX) CAPS Take 1 tablet by mouth 2 times daily    1/4/2021 at hs     hydrochlorothiazide (HYDRODIURIL) 25 MG tablet Take 25 mg by mouth daily    1/4/2021 at am     insulin glargine (BASAGLAR KWIKPEN) 100 UNIT/ML pen Inject 20 Units Subcutaneous At Bedtime    1/4/2021 at hs     levothyroxine (SYNTHROID) 112 MCG tablet Take 112 mcg by mouth daily    1/4/2021 at am     lisinopril (ZESTRIL) 2.5 MG tablet Take 2.5 mg by mouth daily    1/4/2021 at am     metFORMIN (GLUCOPHAGE) 500 MG tablet Take 1,500 mg by mouth every evening (takes 3 x 500mg)   1/4/2021 at 1800     minocycline (DYNACIN) 100 MG tablet Take 100 mg by mouth daily    1/4/2021 at am     multivitamin, therapeutic with minerals (MULTI-VITAMIN) TABS tablet Take 1 tablet by mouth daily   1/4/2021 at Uam     Omeprazole (PRILOSEC PO) Take 20 mg by mouth every morning   1/4/2021 at am     pseudoePHEDrine (SUDAFED) 120 MG 12 hr tablet Take 120 mg by mouth every evening   1/4/2021 at hs     tamsulosin (FLOMAX) 0.4 MG capsule Take 1 capsule (0.4 mg) by mouth daily (Patient taking differently: Take 0.4 mg by mouth every evening ) 90 capsule 2 1/4/2021 at 1800     TESTOSTERONE AQUEOUS IM Inject 250 mg into the muscle every 21 days    Past Week at Unknown time     vitamin  B complex with vitamin C (VITAMIN  B COMPLEX) TABS Take 1 tablet by mouth daily   1/4/2021 at am     magnesium hydroxide (MILK OF MAGNESIA) 400 MG/5ML suspension Take 15 mLs by mouth 2 times daily as needed for constipation or heartburn 355 mL 0  at prn        ALLERGIES:  THE PATIENT HAS INTOLERANCE TO STATINS, BUT IS ABLE TO TOLERATE AT LOWER DOSES.  OTHERWISE, ALLERGY LISTED AS ZETIA.      REVIEW OF SYSTEMS:  A complete 10-point review of systems was performed and is negative other than the items mentioned in above HPI.      PHYSICAL EXAMINATION:   VITAL SIGNS:  Blood pressure 106/61, heart rate 90 beats per minute, temperature 96.9, respiratory rate 16, oxygen saturation 97% on room air.   GENERAL:  The patient is alert, oriented to person, place, date and situation, cooperative, sitting up in the bed in no apparent distress.   HEENT:  Eyes:  Pupils equal, round and reactive to light, extraocular movements intact.  Sclerae nonicteric.  Head normocephalic.  There is a mild abrasion to his forehead, otherwise atraumatic.  Throat, lips, mucosa and tongue appear moist.   NECK:  Supple.   CARDIOVASCULAR:  Heart regular rate and rhythm.  No murmurs, rubs or gallops.  Distal pulses are intact.  No edema.   PULMONARY:  Lungs are clear to auscultation bilaterally.  No crackles, wheezes or rhonchi.  Breathing is nonlabored.   GASTROINTESTINAL:  Abdomen is soft, nontender, nondistended with normoactive bowel sounds.   MUSCULOSKELETAL:  The patient moves all 4 extremities equally with normal strength.  He has an abrasion on his elbow, but otherwise atraumatic.   NEUROLOGIC:  Alert, cranial nerves II-XII are intact and symmetric.  Speech normal.  Moves all 4 extremities equally.  No focal deficits.   SKIN:  Warm, dry, nondiaphoretic.   PSYCHIATRIC:  Normal mood and affect.      LABORATORY DATA AND IMAGING STUDIES:  This is all as reviewed in his paper records sent over from [facility] and are in his physical chart.  Briefly, his CT head without contrast is negative.  CT neck/cervical spine is also without any evidence of fracture or malalignment.  CT PE protocol was negative for pulmonary embolism.  Right lower extremity venous ultrasound was negative for DVT.  Labs  as otherwise listed in the HPI.      ASSESSMENT AND PLAN:  Geoffrey Benjamin is a pleasant 75-year-old male with a past medical history of coronary artery disease, status post coronary artery bypass grafting, hypertension, dyslipidemia and diabetes who presented to the Emergency Department after a syncopal episode.  He was found to be hypotensive on EMS arrival and was evaluated at [facility] Emergency Department, subsequently transferred here to [facility] for observation.      1.  Syncopal episode:  The patient reports taking 2 tramadol overnight due to increased right leg pain.  He felt warm and dizzy this morning and had a syncopal episode while getting up to go the bathroom.  He fell unwitnessed onto a chair.  His wife found him.  He regained consciousness and was not noted to be confused and did not sustain any significant injury.  Head CT is negative.  Cervical spine CT negative for fracture or malalignment.  His TKA looks unchanged on x-ray.  EKG shows NSR with possible partial bundle branch block, unchanged from previous.  Initial troponin is negative.  D-dimer was elevated, so CT chest with PE protocol was done and was negative for PE or any other acute pathology to explain syncope.  His echocardiogram was ordered here and shows mild chordal systolic anterior motion of the mitral valve, suspected, but not optimally visualized and there is dynamic LV outflow obstruction noted across the LVOT with peak gradient 42 mmHg.  LV systolic function was otherwise normal with EF of 65% to 70%.  We will consult Cardiology.  Monitor on telemetry.  Orthostatic vital signs to be checked.  Maintenance IV fluids overnight.  Hold prior to admission Flomax.  Continue PTA atenolol and lisinopril with hold parameters.     2.  Coronary artery disease with history of 4-vessel CABG in 2006:  The patient does report he had some very mild chest pain last night and took an aspirin along with some tramadol.   He states he has not had any chest pain and other times recently nor before his syncopal episode later in the morning.  Troponin is negative.  EKG without ischemia.  Echocardiogram results as above.  Cardiology consulted.  Trend troponins.  Monitor on telemetry.  Otherwise, continue with prior to admission aspirin 325 mg daily, atenolol and lisinopril.     3.  Type 2 diabetes mellitus:  Hemoglobin A1c is 7.9%.  Prior to admission, patient is on Jardiance 10 mg daily, Basaglar 20 units at bedtime, lisinopril 2.5 mg daily, metformin 1500 mg every evening.  Resume prior to admission Basaglar and Amaryl.  Jardiance is not on formulary per Pharmacy.  Hold metformin, as he received contrast dye for his CT.  Sliding scale insulin q.i.d. with Accu-Cheks.   4.  Hypertension:  Hold prior to admission hydrochlorothiazide.  Resume other medications as above with hold parameters.  Monitor for orthostatic hypotension.   5.  Acne rosacea:  Continue prior to admission minocycline upon discharge, currently on hold.   6.  Hyperlipidemia:  Resume Lipitor.   7.  Hypothyroidism:  Resume Synthroid.   8.  BPH:  Hold Flomax    DVT prophylaxis:  This will be with ambulation, anticipate a short stay.      DISPOSITION:  Observation status, anticipating patient can discharge within 24-48 hours, pending his syncope workup and safe disposition.      The patient was discussed with Dr. Erwin Lawson of the [facility]ist [facility].  He is in agreement with my assessment and plan of care.         ERWIN LAWSON MD       As dictated by ETHAN MYLES PA-C            D: 2021   T: 2021   MT: JUANI      Name:     AMARIS CHILDRESS   MRN:      [id number]        Account:      [id number]   :      1945        Admitted:     2021                   Document: [id number]

## 2021-01-05 NOTE — PROGRESS NOTES
MD Notification    Notified Person: MD    Notified Person Name: Dr Marie    Notification Date/Time:    Notification Interaction:    Purpose of Notification: FYI; ECHO is resulted. Thanks.    Orders Received:    Comments:

## 2021-01-05 NOTE — PROGRESS NOTES
diagnostic tests and consults completed and resulted - not met  -vital signs normal or at patient baseline - met  -tolerating oral intake to maintain hydration - partially met IV infusing 100 ml/hr  -returns to baseline functional status - partially met, SBA  -safe disposition plan has been identified - met  Nurse to notify provider when observation goals have been met and patient is ready for discharge.

## 2021-01-05 NOTE — PHARMACY-ADMISSION MEDICATION HISTORY
Pharmacy Medication History  Admission medication history interview status for the 1/5/2021  admission is complete. See EPIC admission navigator for prior to admission medications     Location of Interview: Phone  Medication history sources: Patient, Surescripts and Care Everywhere  Medication history source reliability: Good  Adherence assessment: Good    Oxycodone, miralax, and senna/docusate were removed since the patient no longer takes them.     Medication reconciliation completed by provider prior to medication history? Yes    Time spent in this activity: 20 minutes      Prior to Admission medications    Medication Sig Last Dose Taking? Auth Provider   acetaminophen (TYLENOL) 325 MG tablet Take 2 tablets (650 mg) by mouth every 4 hours as needed for other (mild pain) 1/4/2021 at hs Yes Adán Zafar MD   aspirin (ASA) 325 MG EC tablet Take 1 tablet (325 mg) by mouth daily 1/4/2021 at hs Yes Adán Zafar MD   atenolol (TENORMIN) 25 MG tablet Take 25 mg by mouth daily  1/4/2021 at am Yes Reported, Patient   atorvastatin (LIPITOR) 10 MG tablet Take 10 mg by mouth At Bedtime 1/4/2021 at hs Yes Reported, Patient   Carboxymethylcellul-Glycerin (REFRESH OPTIVE OP) Apply 1 drop to eye 2 times daily 1/4/2021 at hs Yes Reported, Patient   Coenzyme Q10 (COQ10) 200 MG CAPS Take 200-300 mg by mouth At Bedtime 1/4/2021 at hs Yes Unknown, Entered By History   empagliflozin (JARDIANCE) 10 MG TABS tablet Take 10 mg by mouth daily 1/4/2021 at am Yes Reported, Patient   fish oil-omega-3 fatty acids 1000 MG capsule Take 1 g by mouth daily 1/4/2021 at am Yes Unknown, Entered By History   glimepiride (AMARYL) 4 MG tablet Take 4 mg by mouth every morning (before breakfast)  1/4/2021 at am Yes Reported, Patient   Glucosamine-Chondroit-Vit C-Mn (GLUCOSAMINE CHONDROITIN 1500 COMPLEX) CAPS Take 1 tablet by mouth 2 times daily  1/4/2021 at hs Yes Reported, Patient   hydrochlorothiazide (HYDRODIURIL) 25 MG tablet Take 25 mg by  mouth daily  1/4/2021 at am Yes Reported, Patient   insulin glargine (BASAGLAR KWIKPEN) 100 UNIT/ML pen Inject 20 Units Subcutaneous At Bedtime  1/4/2021 at hs Yes Reported, Patient   levothyroxine (SYNTHROID) 112 MCG tablet Take 112 mcg by mouth daily  1/4/2021 at am Yes Reported, Patient   lisinopril (ZESTRIL) 2.5 MG tablet Take 2.5 mg by mouth daily  1/4/2021 at am Yes Reported, Patient   metFORMIN (GLUCOPHAGE) 500 MG tablet Take 1,500 mg by mouth every evening (takes 3 x 500mg) 1/4/2021 at 1800 Yes Reported, Patient   minocycline (DYNACIN) 100 MG tablet Take 100 mg by mouth daily  1/4/2021 at am Yes Reported, Patient   multivitamin, therapeutic with minerals (MULTI-VITAMIN) TABS tablet Take 1 tablet by mouth daily 1/4/2021 at [facility] Yes Reported, Patient   Omeprazole (PRILOSEC PO) Take 20 mg by mouth every morning 1/4/2021 at am Yes Reported, Patient   pseudoePHEDrine (SUDAFED) 120 MG 12 hr tablet Take 120 mg by mouth every evening 1/4/2021 at hs Yes Reported, Patient   tamsulosin (FLOMAX) 0.4 MG capsule Take 1 capsule (0.4 mg) by mouth daily  Patient taking differently: Take 0.4 mg by mouth every evening  1/4/2021 at 1800 Yes Alfonso Sinha MD   TESTOSTERONE AQUEOUS IM Inject 250 mg into the muscle every 21 days  Past Week at Unknown time Yes Reported, Patient   vitamin  B complex with vitamin C (VITAMIN  B COMPLEX) TABS Take 1 tablet by mouth daily 1/4/2021 at am Yes Reported, Patient   magnesium hydroxide (MILK OF MAGNESIA) 400 MG/5ML suspension Take 15 mLs by mouth 2 times daily as needed for constipation or heartburn  at morn  Adán Zafar MD

## 2021-01-06 VITALS
HEIGHT: 66 IN | BODY MASS INDEX: 29.25 KG/M2 | SYSTOLIC BLOOD PRESSURE: 136 MMHG | OXYGEN SATURATION: 98 % | WEIGHT: 182 LBS | DIASTOLIC BLOOD PRESSURE: 76 MMHG | RESPIRATION RATE: 16 BRPM | HEART RATE: 81 BPM | TEMPERATURE: 98.1 F

## 2021-01-06 LAB
ALBUMIN UR-MCNC: NEGATIVE MG/DL
ANION GAP SERPL CALCULATED.3IONS-SCNC: 3 MMOL/L (ref 3–14)
APPEARANCE UR: CLEAR
BILIRUB UR QL STRIP: NEGATIVE
BUN SERPL-MCNC: 12 MG/DL (ref 7–30)
CALCIUM SERPL-MCNC: 8.4 MG/DL (ref 8.5–10.1)
CHLORIDE SERPL-SCNC: 109 MMOL/L (ref 94–109)
CO2 SERPL-SCNC: 28 MMOL/L (ref 20–32)
COLOR UR AUTO: ABNORMAL
CREAT SERPL-MCNC: 0.65 MG/DL (ref 0.66–1.25)
ERYTHROCYTE [DISTWIDTH] IN BLOOD BY AUTOMATED COUNT: 18 % (ref 10–15)
GFR SERPL CREATININE-BSD FRML MDRD: >90 ML/MIN/{1.73_M2}
GLUCOSE BLDC GLUCOMTR-MCNC: 124 MG/DL (ref 70–99)
GLUCOSE BLDC GLUCOMTR-MCNC: 180 MG/DL (ref 70–99)
GLUCOSE BLDC GLUCOMTR-MCNC: 93 MG/DL (ref 70–99)
GLUCOSE SERPL-MCNC: 103 MG/DL (ref 70–99)
GLUCOSE UR STRIP-MCNC: >1000 MG/DL
HCT VFR BLD AUTO: 42.7 % (ref 40–53)
HGB BLD-MCNC: 13.8 G/DL (ref 13.3–17.7)
HGB UR QL STRIP: NEGATIVE
KETONES UR STRIP-MCNC: NEGATIVE MG/DL
LEUKOCYTE ESTERASE UR QL STRIP: NEGATIVE
MCH RBC QN AUTO: 26.8 PG (ref 26.5–33)
MCHC RBC AUTO-ENTMCNC: 32.3 G/DL (ref 31.5–36.5)
MCV RBC AUTO: 83 FL (ref 78–100)
NITRATE UR QL: NEGATIVE
PH UR STRIP: 5.5 PH (ref 5–7)
PLATELET # BLD AUTO: 277 10E9/L (ref 150–450)
POTASSIUM SERPL-SCNC: 3.4 MMOL/L (ref 3.4–5.3)
RBC # BLD AUTO: 5.14 10E12/L (ref 4.4–5.9)
RBC #/AREA URNS AUTO: <1 /HPF (ref 0–2)
SODIUM SERPL-SCNC: 140 MMOL/L (ref 133–144)
SOURCE: ABNORMAL
SP GR UR STRIP: 1.03 (ref 1–1.03)
UROBILINOGEN UR STRIP-MCNC: 0 MG/DL (ref 0–2)
WBC # BLD AUTO: 7.9 10E9/L (ref 4–11)
WBC #/AREA URNS AUTO: <1 /HPF (ref 0–5)

## 2021-01-06 PROCEDURE — 250N000013 HC RX MED GY IP 250 OP 250 PS 637: Performed by: INTERNAL MEDICINE

## 2021-01-06 PROCEDURE — 99213 OFFICE O/P EST LOW 20 MIN: CPT | Performed by: INTERNAL MEDICINE

## 2021-01-06 PROCEDURE — 999N001017 HC STATISTIC GLUCOSE BY METER IP

## 2021-01-06 PROCEDURE — G0378 HOSPITAL OBSERVATION PER HR: HCPCS

## 2021-01-06 PROCEDURE — 36415 COLL VENOUS BLD VENIPUNCTURE: CPT | Performed by: PHYSICIAN ASSISTANT

## 2021-01-06 PROCEDURE — 96372 THER/PROPH/DIAG INJ SC/IM: CPT

## 2021-01-06 PROCEDURE — 250N000013 HC RX MED GY IP 250 OP 250 PS 637: Mod: GY | Performed by: PHYSICIAN ASSISTANT

## 2021-01-06 PROCEDURE — 85027 COMPLETE CBC AUTOMATED: CPT | Performed by: PHYSICIAN ASSISTANT

## 2021-01-06 PROCEDURE — 99207 PR CDG-CODE CATEGORY CHANGED: CPT | Performed by: PHYSICIAN ASSISTANT

## 2021-01-06 PROCEDURE — 81001 URINALYSIS AUTO W/SCOPE: CPT | Performed by: PHYSICIAN ASSISTANT

## 2021-01-06 PROCEDURE — 99217 PR OBSERVATION CARE DISCHARGE: CPT | Performed by: PHYSICIAN ASSISTANT

## 2021-01-06 PROCEDURE — 258N000003 HC RX IP 258 OP 636: Performed by: PHYSICIAN ASSISTANT

## 2021-01-06 PROCEDURE — 80048 BASIC METABOLIC PNL TOTAL CA: CPT | Performed by: PHYSICIAN ASSISTANT

## 2021-01-06 RX ORDER — METOPROLOL SUCCINATE 50 MG/1
50 TABLET, EXTENDED RELEASE ORAL DAILY
Qty: 30 TABLET | Refills: 0 | Status: SHIPPED | OUTPATIENT
Start: 2021-01-07

## 2021-01-06 RX ORDER — METOPROLOL SUCCINATE 50 MG/1
50 TABLET, EXTENDED RELEASE ORAL DAILY
Status: DISCONTINUED | OUTPATIENT
Start: 2021-01-06 | End: 2021-01-06 | Stop reason: HOSPADM

## 2021-01-06 RX ADMIN — SODIUM CHLORIDE: 9 INJECTION, SOLUTION INTRAVENOUS at 06:22

## 2021-01-06 RX ADMIN — METOPROLOL SUCCINATE 50 MG: 50 TABLET, EXTENDED RELEASE ORAL at 14:13

## 2021-01-06 RX ADMIN — GLIMEPIRIDE 4 MG: 2 TABLET ORAL at 09:02

## 2021-01-06 RX ADMIN — OMEPRAZOLE 20 MG: 20 CAPSULE, DELAYED RELEASE ORAL at 09:02

## 2021-01-06 RX ADMIN — LEVOTHYROXINE SODIUM 112 MCG: 112 TABLET ORAL at 09:02

## 2021-01-06 RX ADMIN — INSULIN ASPART 1 UNITS: 100 INJECTION, SOLUTION INTRAVENOUS; SUBCUTANEOUS at 11:53

## 2021-01-06 RX ADMIN — LISINOPRIL 2.5 MG: 2.5 TABLET ORAL at 09:02

## 2021-01-06 RX ADMIN — ATENOLOL 25 MG: 25 TABLET ORAL at 09:01

## 2021-01-06 ASSESSMENT — MIFFLIN-ST. JEOR: SCORE: 1503.3

## 2021-01-06 NOTE — PROGRESS NOTES
Service Date: 01/06/2021      HISTORY OF PRESENT ILLNESS:  Mr. Benjamin is a pleasant 75-year-old gentleman who previously followed with Dr. Obregon with a history of coronary artery disease.  Specifically, he underwent 4-vessel bypass surgery in 11/2006 with LIMA to LAD and diagonal sequentially as well as a vein graft to the PDA and a radial graft to an OM.  He has not had any subsequent interventions.      Mr. Benjamin has transferred his care to an [facility] clinic that is closer to his house and is now following with a cardiologist there.  He was admitted yesterday.  As he was at home and in the middle of the night had some knee pain from his recent right knee arthroplasty that was done on 10/20/2020 which occurred on occasion and he took 2 tramadol.  He went back to bed and then awoke at 4:45 and sat up from bed to go to the bathroom and felt lightheaded.  He has had this feeling in the past, but despite that he did not lie back down or stay sitting.  He got up and started going to the bathroom and unfortunately there he passed out.  He quickly recovered and had no sequelae.  Imaging of the head and neck was negative in the ER, there were no fractures.  They did do a CT that was negative for PE and they did an ultrasound of the legs  that was negative for DVT.  Mr. Benjamin is actually pretty active.  He exercises regularly, doing all of his exercises he needs to do with his knee replacement and is otherwise doing well.  They did check a troponin and it was negative.      Mr. Benjamin as a side note reported that last Sunday he had awoken in the middle of the night with some chest pain.  He was not sure if it is musculoskeletal or something deeper.  He took a baby aspirin and went back to bed and when he woke up it was gone, but he reports that several times in the night he woke up and it was still there.  He states he did not tell his wife about that, but thought he should certainly let us know.  He has not had any  symptoms of arrhythmia.  No palpitations or racing heart.  He did undergo an echocardiogram that showed an EF of 65% to 70%.  He does have some dynamic left ventricular outflow tract obstruction, mild, with a peak gradient of 42.  No other significant findings.  He is on atenolol 25, which he has been on for quite some time.  His creatinine is 0.93 with a GFR of 80.  Normal hemoglobin.  He did have a slight temperature, so they are checking his urine to be certain that there is no evidence of a UTI.  He does not report any other systemic symptoms of concern.      PAST MEDICAL HISTORY:     1.  Diabetes, with an A1c of 7.9 in 10/2020.   2.  Hypothyroidism.   3.  GERD.   4.  Coronary artery disease with CABG in 2006.   5.  Hypertension.   6.  Hyperlipidemia.   7.  History of vasovagal syncope in the past.      PAST SURGICAL HISTORY:  Arthroplasty bilateral knees, bypass surgery, lithotripsy, septoplasty, cataract surgery, foot surgery, hernia repair.      FAMILY HISTORY:  Positive for early coronary artery disease.      SOCIAL HISTORY:  Quit smoking in 1972.  .      MEDICATIONS PRIOR TO ADMISSION:  Include atenolol 25 daily, aspirin 325 daily, Lipitor 10 mg daily, Jardiance 10 mg daily, Amaryl 4 mg every morning, HCTZ 25 daily, Synthroid 112 mcg daily, lisinopril 2.5 mg daily, Glucophage 1500 mg daily.      ALLERGIES:  INTOLERANT TO STATINS.      REVIEW OF SYSTEMS:  Ten-point negative beyond that noted in the HPI.      PHYSICAL EXAMINATION:   VITAL SIGNS:  His blood pressure is 114/65 with a pulse of 85.  He is afebrile at the present time 98.5.  His 182 pounds.  Satting 100% on room air.   GENERAL:  He is a male appearing stated age, no acute distress.   CARDIOVASCULAR:  Regular rate and rhythm without any significant rubs, murmurs or gallops.   LUNGS:  Bilaterally clear.   ABDOMEN:  Soft, nontender.   EXTREMITIES:  No clubbing, cyanosis or edema.   VASCULAR:  No elevation of his JVP.   PSYCHIATRIC:  Normal  affect.   SKIN:  No rashes.   HEENT:  Reasonable dentition.  No icterus.      LABORATORY DATA:  As noted in the HPI.      IMPRESSION, REPORT, PLAN:   1.  History of coronary artery disease with coronary artery bypass grafting x4 in .   2.  Vasovagal syncope.   3.  Diabetes type 2, with an A1c of 7.9.   4.  Hypertension.   5.  Hyperlipidemia.      DISCUSSION:  It was a pleasure being involved in the care of Mr. Benjamin.  I reviewed his history and symptoms in detail as well as the detail regarding his syncopal event.  It does sound vasovagal and this could have been worsened by the fact that he took 2 of the tramadol as well.  We discussed today the importance if he does feel lightheaded when he gets up to stay sitting, which he is certainly in agreement with and will do in the future.  It does not sound like there is any concern for arrhythmia.  As a side note, he did report this chest pain that is difficult to tease out.  His troponin was negative.  His EF is normal.  He has not had it again.  He is a fairly low risk, but I think it would be reasonable to do a stress test.  He does not have to stay for that, it could be done as an outpatient and he could do it with his regular cardiologist.  I will switch atenolol to metoprolol 50 mg once a day for a little bit better blood pressure control as well as to help the dynamic outflow obstruction.  He understands and is in agreement with the plan as outlined.        Cardiology will sign off.  Please call with any questions or concerns.         ALAINA BURKETT MD             D: 2021   T: 2021   MT: JUANI      Name:     AMARIS BENJAMIN   MRN:      [id number]        Account:      [id number]   :      1945           Service Date: 2021      Document: [id number]

## 2021-01-06 NOTE — PROGRESS NOTES
[facility]    Hospitalist Progress Note    Assessment & Plan   Geoffrey Benjamin is a 75 year old male who was admitted on 1/5/2021.     Past medical history significant for coronary artery disease, status post coronary artery bypass grafting, hypertension, dyslipidemia and diabetes who presented to the Emergency Department after a syncopal episode.  He was found to be hypotensive on EMS arrival and was evaluated at [facility] Emergency Department, subsequently transferred here to [facility] for observation.      Syncopal episode:    The patient reports taking 2 tramadol overnight due to increased right leg pain.  He felt warm and dizzy this morning and had a syncopal episode while getting up to go the bathroom.  He fell unwitnessed onto a chair.  His wife found him.  He regained consciousness and was not noted to be confused and did not sustain any significant injury.  Head CT is negative.  Cervical spine CT negative for fracture or malalignment.  His TKA looks unchanged on x-ray. EKG shows NSR with possible partial bundle branch block, unchanged from previous.  Initial troponin is negative.  D-dimer was elevated, so CT chest with PE protocol was done and was negative for PE or any other acute pathology to explain syncope.   -Echocardiogram revealed mild chordal systolic anterior motion of the mitral valve, suspected, but not optimally visualized and there is dynamic LV outflow obstruction noted across the LVOT with peak gradient 42 mmHg.  LV systolic function was otherwise normal with EF of 65% to 70%.    -Noted history of vasovagal syncope.  Upon discussion with the patient, his episode occurred after standing up and he developed dizziness and blacked out.    --Cardiology consult requested:  --Monitor on telemetry.    --Orthostatic vital signs (negative x2); though noted to have soft pressures upon admission.    --Maintenance IV fluids overnight.    --Hold prior  to admission Flomax.    --Continue PTA atenolol and lisinopril with hold parameters.      Fever:  Noted temp of 100.4 this morning and previous tachycardia upon admission (HR of 104).  Review of 212 paperwork with negative UA.  Chest CT with bilateral atelectasis otherwise clear.  Right LE US negative for DVT (in the setting of recent right TKA).    --UA now.    --Monitor.      Coronary artery disease with history of 4-vessel CABG in 2006:   The patient does report he had some very mild chest pain last night and took an aspirin along with some tramadol.  He states he has not had any chest pain and other times recently nor before his syncopal episode later in the morning.  Troponin is negative.  EKG without ischemia.  Echocardiogram results as above.    -TROPONIN: Negative x2.    --Cardiology consult requested.    --Monitor on telemetry.    --Otherwise, continue with prior to admission aspirin 325 mg daily, atenolol and lisinopril.     Type 2 diabetes mellitus:    Hemoglobin A1c is 7.9%.  Prior to admission, patient is on Jardiance 10 mg daily, Basaglar 20 units at bedtime, lisinopril 2.5 mg daily, metformin 1500 mg every evening.    --Resume prior to admission Basaglar and Amaryl.  Jardiance is not on formulary per Pharmacy.    --Hold metformin, as he received contrast dye for his CT.    --Sliding scale insulin QID with Accu-Cheks.     Hypertension:    --Hold prior to admission hydrochlorothiazide.    --Resume other medications as above with hold parameters.  Monitor for orthostatic hypotension.     Acne rosacea:    --Continue prior to admission minocycline upon discharge, currently on hold.     Hyperlipidemia:    --Resume PTA Lipitor.     Hypothyroidism:    --Resume PTA Synthroid.     BPH:    --Hold PTA Flomax.    COVID-19 screening:  Performed at [place] [facility] ED and per paper chart that was sent with the patient was negative for COVID-19 and Influenza A&B.      Diet: Moderate Consistent CHO Diet     DVT  Prophylaxis: Ambulate every shift   Schmitz Catheter: not present  Code Status: Full Code     Disposition Plan    Expected discharge: Today versus tomorrow, recommended to prior living arrangement once no reoccurence of syncope, cardiology consult completed.   Entered: Sheldon Wong PA-C 01/06/2021, 5:20 AM          The patient has been discussed with Dr. Lawson, who agrees with the assessment and plan at this time.  Dr. Lawson will evaluate the patient independently.      Kel Wong PA-C   [phone]    Interval History   Patient was seated in bed upon arrival.  He denied fever, chills, chest pain, shortness of breath or abdominal pain.  He has been urinating frequently.  Reviewed syncopal episode that occurred yesterday.  Patient stated he stood up and became lightheaded and should have sat back down but instead remained standing and then blacked out.  He indicated he had vasovagal syncope previously.    Discussed fever from this morning and why urine was collected.  Reviewed work-up at [facility].      Dr. Gomez of Cardiology entered the room during discussion and began to assess the patient after I left.      -Data reviewed today: I reviewed all new labs and imaging results over the last 24 hours. I personally reviewed no images or EKG's today.    Physical Exam   Temp: 99.1  F (37.3  C) Temp src: Oral BP: 108/58 Pulse: 88   Resp: 18 SpO2: 97 % O2 Device: None (Room air)    Vitals:    01/05/21 1148   Weight: 84.4 kg (186 lb)     Vital Signs with Ranges  Temp:  [96.8  F (36  C)-99.1  F (37.3  C)] 99.1  F (37.3  C)  Pulse:  [] 88  Resp:  [16-18] 18  BP: ()/(52-70) 108/58  SpO2:  [96 %-97 %] 97 %  I/O last 3 completed shifts:  In: 946 [I.V.:946]  Out: -       Constitutional: Awake, alert, cooperative, no apparent distress.    ENT: Normocephalic, without obvious abnormality, atraumatic, oral pharynx with moist mucus membranes, tonsils without erythema or exudates.  Neck: Supple,  symmetrical, trachea midline, no adenopathy.  Pulmonary: No increased work of breathing, good air exchange, clear to auscultation bilaterally, no crackles or wheezing.  Cardiovascular: Regular rate and rhythm, normal S1 and S2, no S3 or S4, and no murmur noted.  GI: Normal bowel sounds, soft, non-distended, non-tender.  Skin/Integumen: Clear.  Neuro: CN II-XII grossly intact.  Psych:  Alert and oriented x 3. Normal affect.  Extremities: No lower extremity edema noted, and calves are non-TTP bilaterally.       Medications     sodium chloride 100 mL/hr at 21 2138       aspirin  325 mg Oral At Bedtime     atenolol  25 mg Oral Daily     atorvastatin  10 mg Oral At Bedtime     glimepiride  4 mg Oral QAM AC     insulin aspart  1-7 Units Subcutaneous TID AC     insulin aspart  1-5 Units Subcutaneous At Bedtime     insulin glargine  20 Units Subcutaneous At Bedtime     levothyroxine  112 mcg Oral Daily     lisinopril  2.5 mg Oral Daily     omeprazole  20 mg Oral QAM     sodium chloride (PF)  3 mL Intracatheter Q8H       Data   Recent Labs   Lab 21  1821 21  1411   TROPI <0.015 <0.015       Recent Results (from the past 24 hour(s))   Echocardiogram Complete    Narrative    [id number]  [id number]  [id number]  [id number]^^ETHAN^JOSEPH           [facility]  Echocardiography Laboratory  [address] [place] [address]        Name: AMARIS CHILDRESS  MRN: [id number]  : 1945  Study Date: 2021 01:42 PM  Age: 75 yrs  Gender: Male  Patient Location: [facility]  Reason For Study: Syncope  History: CABG  Ordering Physician: ETHAN MYLES  Referring Physician: DARREL FREITAS  Performed By: Claudio Temple RDCS     BSA: 1.9 m2  Height: 66 in  Weight: 186 lb  HR: 90  BP: 106/61 mmHg  _____________________________________________________________________________  __        Procedure  Complete Portable Echo Adult. [employer]ison (NDC #4920-5922) given  intravenously.  _____________________________________________________________________________  __        Interpretation Summary     Left ventricular systolic function is normal.  The visual ejection fraction is estimated at 65-70%.  Mild chordal systolic anterior motion of mitral valve suspected but not  optimally visualized.There is dynamic LV outflow obstruction noted across LVOT  with peak gradient of 42mm Hg,  The study was technically difficult.  _____________________________________________________________________________  __        Left Ventricle  The left ventricle is normal in size. There is concentric remodeling present.  Mild chordal systolic anterior motion of mitral valve suspected but not  optimally visualized.There is dynamic LV outflow obstruction noted across LVOT  with peak gradient of 42mm Hg,. Left ventricular systolic function is normal.  The visual ejection fraction is estimated at 65-70%. Grade I or early  diastolic dysfunction. No regional wall motion abnormalities noted.     Right Ventricle  The right ventricle is normal size. The right ventricular systolic function is  normal.     Atria  Normal left atrial size. Right atrial size is normal.     Mitral Valve  The mitral valve leaflets are mildly thickened. There is mild mitral annular  calcification. There is trace to mild mitral regurgitation.        Tricuspid Valve  There is trace tricuspid regurgitation. The right ventricular systolic  pressure is approximated at 32.5 mmHg plus the right atrial pressure.     Aortic Valve  There is mild trileaflet aortic sclerosis. No hemodynamically significant  valvular aortic stenosis.     Pulmonic Valve  The pulmonic valve is not well seen, but is grossly normal.     Vessels  The aortic root is normal size.     Pericardium  There is no pericardial effusion.        Rhythm  Sinus rhythm was noted.  _____________________________________________________________________________  __  MMode/2D Measurements &  Calculations  IVSd: 1.1 cm     LVIDd: 4.4 cm  LVIDs: 2.1 cm  LVPWd: 1.2 cm  FS: 52.5 %  LV mass(C)d: 180.4 grams  LV mass(C)dI: 93.0 grams/m2  Ao root diam: 3.2 cm  LA dimension: 4.2 cm  asc Aorta Diam: 3.4 cm  LA/Ao: 1.3  LA Volume (BP): 50.9 ml  LA Volume Index (BP): 26.2 ml/m2  RWT: 0.56           Doppler Measurements & Calculations  MV E max alexandra: 88.6 cm/sec  MV A max alexandra: 127.4 cm/sec  MV E/A: 0.70  MV dec time: 0.24 sec  Ao V2 max: 200.9 cm/sec  Ao max P.0 mmHg  Ao V2 mean: 149.7 cm/sec  Ao mean P.8 mmHg  Ao V2 VTI: 38.5 cm  PA V2 max: 157.2 cm/sec  PA max P.9 mmHg  PA mean P.4 mmHg  PA V2 VTI: 29.0 cm  PA acc time: 0.10 sec  TR max alexandra: 285.1 cm/sec  TR max P.5 mmHg  E/E' av.2  Lateral E/e': 13.4  Medial E/e': 15.1           _____________________________________________________________________________  __           Report approved by: Ruy Solomon 2021 03:29 PM

## 2021-01-06 NOTE — PLAN OF CARE
A&O, calm and cooperative, VSS on R, denies pain, on mod carb diet with good appetite, continent x 2, Tele SR with PAC's. For possible discharge today.

## 2021-01-06 NOTE — PLAN OF CARE
Pt alert and oriented, vss denies c/opain or ches pain. Discharge instructions given andreviewed with pt. Denies questions. Awaiting ride from wife.

## 2021-01-06 NOTE — DISCHARGE SUMMARY
[facility]  Hospitalist Discharge Summary     Admit Date:  1/5/2021  Discharge Date:     1/6/2021  Discharging Provider: Sheldon Wong PA-C    PRIMARY CARE PROVIDER:    Kike Ng     DISCHARGE DIAGNOSES:   Syncopal episode  Fever  Coronary artery disease with history of 4-vessel CABG in 2006  Type 2 diabetes mellitus  Hypertension  Acne rosacea  Hyperlipidemia  Hypothyroidism  BPH  COVID-19 screening     DISCHARGE MEDICATIONS:       Review of your medicines      START taking      Dose / Directions   metoprolol succinate ER 50 MG 24 hr tablet  Commonly known as: TOPROL-XL      Dose: 50 mg  Start taking on: January 7, 2021  Take 1 tablet (50 mg) by mouth daily  Quantity: 30 tablet  Refills: 0        CONTINUE these medicines which may have CHANGED, or have new prescriptions. If we are uncertain of the size of tablets/capsules you have at home, strength may be listed as something that might have changed.      Dose / Directions   tamsulosin 0.4 MG capsule  Commonly known as: FLOMAX  This may have changed: when to take this  Used for: BPH (benign prostatic hyperplasia)      Dose: 0.4 mg  Take 1 capsule (0.4 mg) by mouth daily  Quantity: 90 capsule  Refills: 2        CONTINUE these medicines which have NOT CHANGED      Dose / Directions   acetaminophen 325 MG tablet  Commonly known as: TYLENOL  Used for: S/P total knee arthroplasty, right      Dose: 650 mg  Take 2 tablets (650 mg) by mouth every 4 hours as needed for other (mild pain)  Quantity: 100 tablet  Refills: 0     aspirin 325 MG EC tablet  Commonly known as: ASA  Used for: S/P total knee arthroplasty, right      Dose: 325 mg  Take 1 tablet (325 mg) by mouth daily  Quantity: 30 tablet  Refills: 0     atorvastatin 10 MG tablet  Commonly known as: LIPITOR      Dose: 10 mg  Take 10 mg by mouth At Bedtime  Refills: 0     CoQ10 200 MG Caps      Dose: 200-300 mg  Take 200-300 mg by mouth At Bedtime  Refills: 0      empagliflozin 10 MG Tabs tablet  Commonly known as: JARDIANCE      Dose: 10 mg  Take 10 mg by mouth daily  Refills: 0     fish oil-omega-3 fatty acids 1000 MG capsule      Dose: 1 g  Take 1 g by mouth daily  Refills: 0     glimepiride 4 MG tablet  Commonly known as: AMARYL      Dose: 4 mg  Take 4 mg by mouth every morning (before breakfast)  Refills: 0     glucosamine chondroitin 1500 complex Caps      Dose: 1 tablet  Take 1 tablet by mouth 2 times daily  Refills: 0     hydrochlorothiazide 25 MG tablet  Commonly known as: HYDRODIURIL      Dose: 25 mg  Take 25 mg by mouth daily  Refills: 0     insulin glargine 100 UNIT/ML pen      Dose: 20 Units  Inject 20 Units Subcutaneous At Bedtime  Refills: 0     lisinopril 2.5 MG tablet  Commonly known as: ZESTRIL      Dose: 2.5 mg  Take 2.5 mg by mouth daily  Refills: 0     magnesium hydroxide 400 MG/5ML suspension  Commonly known as: MILK OF MAGNESIA  Used for: S/P total knee arthroplasty, right      Dose: 15 mL  Take 15 mLs by mouth 2 times daily as needed for constipation or heartburn  Quantity: 355 mL  Refills: 0     metFORMIN 500 MG tablet  Commonly known as: GLUCOPHAGE      Dose: 1,500 mg  Take 1,500 mg by mouth every evening (takes 3 x 500mg)  Refills: 0     minocycline 100 MG tablet  Commonly known as: DYNACIN      Dose: 100 mg  Take 100 mg by mouth daily  Refills: 0     Multi-vitamin tablet      Dose: 1 tablet  Take 1 tablet by mouth daily  Refills: 0     PRILOSEC PO      Dose: 20 mg  Take 20 mg by mouth every morning  Refills: 0     pseudoePHEDrine 120 MG 12 hr tablet  Commonly known as: SUDAFED      Dose: 120 mg  Take 120 mg by mouth every evening  Refills: 0     REFRESH OPTIVE OP      Dose: 1 drop  Apply 1 drop to eye 2 times daily  Refills: 0     Synthroid 112 MCG tablet  Generic drug: levothyroxine      Dose: 112 mcg  Take 112 mcg by mouth daily  Refills: 0     TESTOSTERONE AQUEOUS IM      Dose: 250 mg  Inject 250 mg into the muscle every 21 days  Refills: 0      vitamin B complex with vitamin C tablet      Dose: 1 tablet  Take 1 tablet by mouth daily  Refills: 0        STOP taking    atenolol 25 MG tablet  Commonly known as: TENORMIN              Where to get your medicines      These medications were sent to [facility] [place], [place] - [address] [facility]  [address] [address]    Phone: [phone]     metoprolol succinate ER 50 MG 24 hr tablet        ALLERGIES:    Allergies   Allergen Reactions     Ezetimibe      Muscle aches     Hmg-Coa-R Inhibitors Nausea and Vomiting     Rosuvastatin Nausea and Vomiting     Simvastatin      Muscle aches       DISPOSITION:    Discharged to home  Condition at discharge: Stable        Reason for your hospital stay    You were at [facility] due to syncopal event.  You were monitored overnight and received IV fluids.  You were evaluated by a Cardiologist and had some medications changed.  Recommendations were made for follow-up with your primary care provider and Cardiologist (Dr. Springer) to have na outpatient stress test.     Activity    Your activity upon discharge: activity as tolerated     Follow-up and recommended labs and tests     Follow up with primary care provider, Kike Ng, within 7 days to evaluate medication change and for hospital follow- up.  Potentially setting up an outpatient stress test.      Follow up with your Cardiologist (Dr. Springer)  arrange outpatient stress test.  Dr Springer is not available.  Follow up with Dr. Riggins  Monday January 11th 8am to discuss stress test.     Full Code    Discussed by admitting provider.     Diet    Follow this diet upon discharge: Orders Placed This Encounter      Moderate Consistent CHO Diet, 2 gram daily salt limit, low fat        CONSULTS:     CARDIOLOGY IP CONSULT     LABORATORY IMAGING AND PROCEDURES:   Laboratory studies included Troponin x2, serial blood glucose levels, BMP, CBC with platelets, UA.    Imaging studies  included Echocardiogram.     PENDING RESULTS:    None     PHYSICAL EXAMINATION ON DAY OF DISCHARGE:    Please review progress note as written earlier today.      BRIEF HISTORY OF PRESENT ILLNESS:    Geoffrey Benjamin is a 75 year old male who was admitted on 1/5/2021.     Past medical history significant for coronary artery disease, status post coronary artery bypass grafting, hypertension, dyslipidemia and diabetes who presented to the Emergency Department after a syncopal episode.  He was found to be hypotensive on EMS arrival and was evaluated at [facility] Emergency Department, subsequently transferred here to [facility] for observation.      HOSPITAL COURSE:      Syncopal episode:    The patient reports taking 2 tramadol overnight due to increased right leg pain.  He felt warm and dizzy this morning and had a syncopal episode while getting up to go the bathroom.  He fell unwitnessed onto a chair.  His wife found him.  He regained consciousness and was not noted to be confused and did not sustain any significant injury.  Head CT is negative.  Cervical spine CT negative for fracture or malalignment.  His TKA looks unchanged on x-ray. EKG shows NSR with possible partial bundle branch block, unchanged from previous.  Initial troponin is negative.  D-dimer was elevated, so CT chest with PE protocol was done and was negative for PE or any other acute pathology to explain syncope.   -Echocardiogram revealed mild chordal systolic anterior motion of the mitral valve, suspected, but not optimally visualized and there is dynamic LV outflow obstruction noted across the LVOT with peak gradient 42 mmHg.  LV systolic function was otherwise normal with EF of 65% to 70%.    -Noted history of vasovagal syncope.  Upon discussion with the patient, his episode occurred after standing up and he developed dizziness and blacked out.    --Cardiology consult appreciated.     --Stop PTA atenolol and start  metoprolol XL 50 mg daily.     --Recommend outpatient stress test.      --Patient preferred to have stress test through his own cardiologist (Dr. Springer).      --Dr. Springer has no availability and patient was set up to see Dr. Riggins 1/11/2021 at 8am to discuss stress test.    --Monitor on telemetry.    --Orthostatic vital signs (negative x2); though noted to have soft pressures upon admission.    --Maintenance IV fluids overnight.    --Hold prior to admission Flomax, resume at discharge.    --Continue PTA lisinopril with hold parameters.      Fever:  Noted temp of 100.4 this morning and previous tachycardia upon admission (HR of 104).  Review of 212 paperwork with negative UA.  Chest CT with bilateral atelectasis otherwise clear.  Right LE US negative for DVT (in the setting of recent right TKA).    --UA now.    --Monitor.      Coronary artery disease with history of 4-vessel CABG in 2006:   The patient does report he had some very mild chest pain last night and took an aspirin along with some tramadol.  He states he has not had any chest pain and other times recently nor before his syncopal episode later in the morning.  Troponin is negative.  EKG without ischemia.  Echocardiogram results as above.    -TROPONIN: Negative x2.    --Cardiology consult appreciated.     --Stop PTA atenolol and start metoprolol XL 50 mg daily.     --Recommend outpatient stress test.      --Patient preferred to have stress test through his own cardiologist (Dr. Springer).      --Dr. Springer has no availability and patient was set up to see Dr. Riggins 1/11/2021 at 8am to discuss stress test.    --Monitor on telemetry.    --Otherwise, continue with prior to admission aspirin 325 mg daily and lisinopril.     Type 2 diabetes mellitus:    Hemoglobin A1c is 7.9%.  Prior to admission, patient is on Jardiance 10 mg daily, Basaglar 20 units at bedtime, lisinopril 2.5 mg daily, metformin 1500 mg every evening.    --Resume prior to admission Basaglar and  Naga.  Ellen is not on formulary per Pharmacy.  Will resume at discharge.    --Hold metformin, as he received contrast dye for his CT.  Resume at discharge.     --Sliding scale insulin QID with Accu-Cheks.     Hypertension:    --Hold prior to admission hydrochlorothiazide, resume at discharge.    --Resume other medications as above with hold parameters.  Monitor for orthostatic hypotension.     Acne rosacea:    --Continue prior to admission minocycline upon discharge, currently on hold. Resume at discharge.      Hyperlipidemia:    --Resume PTA Lipitor.     Hypothyroidism:    --Resume PTA Synthroid.     BPH:    --Hold PTA Flomax.    COVID-19 screening:  Performed at [place] [address] ED and per paper chart that was sent with the patient was negative for COVID-19 and Influenza A&B.      Diet: Moderate Consistent CHO Diet     DVT Prophylaxis: Ambulate every shift   Schmitz Catheter: not present  Code Status: Full Code        The patient was discussed with Dr. Lawson who agrees with discharge at this time.  Dr. Lawson will evaluate the patient independently.      TOTAL DISCHARGE TIME:  Sheldon DE LA ROSA PA-C, personally saw the patient today and spent less than or equal to 30 minutes discharging this patient.    Sheldon Wong PA-C  [facility]

## 2021-01-06 NOTE — PLAN OF CARE
Covid negative.  A&Ox4.  AVSS except SBP 99/57.  Tele SR.  Denies pain.  SBA1.  Voiding.  Fall risk.  Bed alarm on.  Mod CHO diet.  NS infusing at 100 ml/hr.   HS .  No S/S HS coverage.  Lantus given.  Plan for Cardiology cnslt.

## 2021-01-06 NOTE — PROGRESS NOTES
Observation goals PRIOR TO DISCHARGE     Comments: -diagnostic tests and consults completed and resulted , yes  -vital signs normal or at patient baseline , yes  -tolerating oral intake to maintain hydration , yes  -returns to baseline functional status , yes  -safe disposition plan has been identified , yes  Nurse to notify provider when observation goals have been met and patient is ready for discharge.

## 2021-01-06 NOTE — PROGRESS NOTES
Observation Goals  -diagnostic tests and consults completed and resulted -yes  -vital signs normal or at patient baseline -yes  -tolerating oral intake to maintain hydration -yes   -returns to baseline functional status-yes   -safe disposition plan has been identified-no   Pt A&O, VSS, pt remains in SR.  Pt up with SBA and steady on his feet.  Pt continues to receive NS at 100cc/hr.  Pt stating that he feels pretty good.  Will continue to monitor.

## 2021-01-06 NOTE — PROGRESS NOTES
Care Coordination:    Asked by PA to arrange OP appointments with cardiology and PCP.  Met with patient to explain role.  Pt wishes to make own PCP  Wants CC to set up cardiology and stress test if able.  Clinic does not have openings for OP stress test till Feb (PA updated) and wants Pt to see Cardiology next week and see if they can arrange.   Following appointment made and added to AVS.   Follow up with your Cardiologist (Dr. Springer)  arrange outpatient stress test.  Dr Springer is not available.  Follow up with Dr. Riggins  Monday January 11th 8am to discuss stress test.     Charge nurse updated.  Plan discharge this afternoon. Bedside nurse to coordinate with patient timing for ride.         Arleen Melchor RN BSN  Inpatient Care Coordination  [facility]  [phone]

## 2021-01-06 NOTE — PLAN OF CARE
Observation goals PRIOR TO DISCHARGE     Comments: -diagnostic tests and consults completed and resulted Partially Met  -vital signs normal or at patient baseline   Met  -tolerating oral intake to maintain hydration   Met  -returns to baseline functional status   Not Met  -safe disposition plan has been identified   Not Met  Nurse to notify provider when observation goals have been met and patient is ready for discharge.  Yes

## 2021-01-06 NOTE — PROGRESS NOTES
Direct admit today around 11.45 am due to syncopal episode at home. On arrival denied dizziness. Troponin negative 2/3. ECHO completed. Cardiology consulted for tomorrow.  ml/hr infusing. Orthostatic negative. SBA with transfer/bed alarm on. Mod carb diet. On sliding scale insulin and Lantus insulin. Continue to monitor.

## 2021-01-06 NOTE — PROGRESS NOTES
Observation Goals  -diagnostic tests and consults completed and resulted -yes  -vital signs normal or at patient baseline -yes  -tolerating oral intake to maintain hydration -yes   -returns to baseline functional status-yes   -safe disposition plan has been identified-no

## 2021-01-06 NOTE — PROGRESS NOTES
Observation goals PRIOR TO DISCHARGE     Comments: -diagnostic tests and consults completed and resulted , no  -vital signs normal or at patient baseline , yes  -tolerating oral intake to maintain hydration , yes  -returns to baseline functional status , yes  -safe disposition plan has been identified , yes  Nurse to notify provider when observation goals have been met and patient is ready for discharge.

## 2021-01-15 ENCOUNTER — HEALTH MAINTENANCE LETTER (OUTPATIENT)
Age: 76
End: 2021-01-15

## 2021-06-25 DIAGNOSIS — N40.0 BPH (BENIGN PROSTATIC HYPERPLASIA): ICD-10-CM

## 2021-06-25 RX ORDER — TAMSULOSIN HYDROCHLORIDE 0.4 MG/1
0.4 CAPSULE ORAL DAILY
Qty: 90 CAPSULE | Refills: 0 | Status: SHIPPED | OUTPATIENT
Start: 2021-06-25 | End: 2021-09-20

## 2021-07-01 DIAGNOSIS — N40.1 BENIGN PROSTATIC HYPERPLASIA WITH WEAK URINARY STREAM: Primary | ICD-10-CM

## 2021-07-01 DIAGNOSIS — R39.12 BENIGN PROSTATIC HYPERPLASIA WITH WEAK URINARY STREAM: Primary | ICD-10-CM

## 2021-07-05 ENCOUNTER — OFFICE VISIT (OUTPATIENT)
Dept: UROLOGY | Facility: CLINIC | Age: 76
End: 2021-07-05
Payer: MEDICARE

## 2021-07-05 VITALS
BODY MASS INDEX: 29.25 KG/M2 | WEIGHT: 182 LBS | SYSTOLIC BLOOD PRESSURE: 100 MMHG | DIASTOLIC BLOOD PRESSURE: 62 MMHG | HEART RATE: 67 BPM | HEIGHT: 66 IN | OXYGEN SATURATION: 98 %

## 2021-07-05 DIAGNOSIS — N40.1 BENIGN PROSTATIC HYPERPLASIA WITH WEAK URINARY STREAM: Primary | ICD-10-CM

## 2021-07-05 DIAGNOSIS — N52.9 ERECTILE DYSFUNCTION, UNSPECIFIED ERECTILE DYSFUNCTION TYPE: ICD-10-CM

## 2021-07-05 DIAGNOSIS — R39.12 BENIGN PROSTATIC HYPERPLASIA WITH WEAK URINARY STREAM: Primary | ICD-10-CM

## 2021-07-05 DIAGNOSIS — Z80.42 FAMILY HISTORY OF PROSTATE CANCER: ICD-10-CM

## 2021-07-05 PROBLEM — E11.9 DIABETES MELLITUS, TYPE 2 (H): Status: ACTIVE | Noted: 2021-07-05

## 2021-07-05 LAB
ALBUMIN UR-MCNC: NEGATIVE MG/DL
APPEARANCE UR: CLEAR
BILIRUB UR QL STRIP: NEGATIVE
COLOR UR AUTO: YELLOW
GLUCOSE UR STRIP-MCNC: >=1000 MG/DL
HGB UR QL STRIP: ABNORMAL
KETONES UR STRIP-MCNC: NEGATIVE MG/DL
LEUKOCYTE ESTERASE UR QL STRIP: NEGATIVE
NITRATE UR QL: NEGATIVE
PH UR STRIP: 5.5 PH (ref 5–7)
RESIDUAL VOLUME (RV) (EXTERNAL): 55
SOURCE: ABNORMAL
SP GR UR STRIP: 1.01 (ref 1–1.03)
UROBILINOGEN UR STRIP-ACNC: 0.2 EU/DL (ref 0.2–1)

## 2021-07-05 PROCEDURE — 51798 US URINE CAPACITY MEASURE: CPT | Performed by: UROLOGY

## 2021-07-05 PROCEDURE — 81003 URINALYSIS AUTO W/O SCOPE: CPT | Performed by: UROLOGY

## 2021-07-05 PROCEDURE — 99213 OFFICE O/P EST LOW 20 MIN: CPT | Mod: 25 | Performed by: UROLOGY

## 2021-07-05 RX ORDER — SILDENAFIL CITRATE 20 MG/1
100 TABLET ORAL PRN
Qty: 30 TABLET | Refills: 11 | Status: SHIPPED | OUTPATIENT
Start: 2021-07-05 | End: 2022-11-02

## 2021-07-05 RX ORDER — TADALAFIL 20 MG/1
20 TABLET ORAL DAILY PRN
Qty: 30 TABLET | Refills: 11 | Status: SHIPPED | OUTPATIENT
Start: 2021-07-05 | End: 2022-11-02

## 2021-07-05 ASSESSMENT — MIFFLIN-ST. JEOR: SCORE: 1503.3

## 2021-07-05 ASSESSMENT — PAIN SCALES - GENERAL: PAINLEVEL: NO PAIN (0)

## 2021-07-05 NOTE — NURSING NOTE
Chief Complaint   Patient presents with     Benign Prostatic Hypertrophy     patients only complaint is frequency   Marly Bowden LPN

## 2021-07-05 NOTE — PROGRESS NOTES
"[facility]   CHIEF COMPLAINT   It was my pleasure to see Geoffrey Benjamin who is a 75 year old male for follow-up of BPH and family history of prostate cancer .      HPI   Geoffrey Benjamin is a very pleasant 75 year old male who presents with a history of BPH and family history of prostate cancer.    Prior patient of Dr. Sinha - last seen 5/13/20:  \"Mr. Benjamin is a 74-year-old male with a father who had prostate cancer.  Mr. Benjamin had an MRI scan and biopsies of the left apex and left mid gland that were benign.  His PSA was 2.5.  Today's PSA is 1.30.  He has had no flank pain, hematuria dysuria and has a good urinary stream.  He has no new concerns\"    TODAY 7/5/21:   He is on tamsulosin 0.4mg (Flomax)   Doing really well since starting tamsulosin 0.4mg (Flomax)  He is taking a diuretic in the morning    He is having Erectile dysfunction  - tried both tadalafil (Cialis) 20mg and sildenafil 100-120mg (Viagra)   This is going pretty well and he alternates medications and knows not to take them in the same 24 hours    PVR = 55cc    PHYSICAL EXAM  Patient is a 75 year old  male   Vitals: Blood pressure 100/62, pulse 67, height 1.676 m (5' 6\"), weight 82.6 kg (182 lb), SpO2 98 %.  Body mass index is 29.38 kg/m .  General Appearance Adult:   Alert, no acute distress, oriented  HENT: throat/mouth:normal, good dentition  Lungs: no respiratory distress, or pursed lip breathing  Heart: No obvious jugular venous distension present  Abdomen: soft, nontender, no organomegaly or masses  Musculoskeltal: extremities normal, no peripheral edema  Skin: no suspicious lesions or rashes  Neuro: Alert, oriented, speech and mentation normal  Psych: affect and mood normal  Gait: Normal    Component PSA Diag Urologic Phys   Latest Ref Rng & Units 0.00 - 4.00 ng/mL   4/30/2019 2.50   5/13/2020 1.30         ASSESSMENT and PLAN  75-year-old man with history of BPH with LUTS and erectile dysfunction also with a family history of prostate " cancer    BPH with LUTS  -He is doing very well on tamsulosin 0.4 mg daily  -His PVR today is low at 55 cc  -Continue with medical management    Erectile dysfunction  -He does well with sildenafil 100 mg as needed or Cialis 20 mg as needed.  He alternates these medications and knows not to take them in the same 24 hours  -Refill prescription sent to the pharmacy  -Follow-up in 1 year for symptom check    Family history of prostate cancer  -I reviewed his PSA history and trend which is very reassuring  -We discussed that given his age of 75 no further PSA monitoring is needed    He may follow-up in 1 year with my PA Shayy for symptom check      Time spent: 20 minutes spent on the date of the encounter doing chart review, history and exam, documentation and further activities as noted above.    Ike Lora MD   Urology  [facility]  [facility] Phone: [phone]  [facility] Phone: [phone]

## 2021-07-05 NOTE — LETTER
"7/5/2021       RE: Geoffrey Benjamin  [address] [address]     Dear Colleague,    Thank you for referring your patient, Geoffrey Benjamin, to the [facility] at [facility]. Please see a copy of my visit note below.    [place]   CHIEF COMPLAINT   It was my pleasure to see Geoffrey Benjamin who is a 75 year old male for follow-up of BPH and family history of prostate cancer .      HPI   Geoffrey Benjamin is a very pleasant 75 year old male who presents with a history of BPH and family history of prostate cancer.    Prior patient of Dr. Sinha - last seen 5/13/20:  \"Mr. Benjamin is a 74-year-old male with a father who had prostate cancer.  Mr. Benjamin had an MRI scan and biopsies of the left apex and left mid gland that were benign.  His PSA was 2.5.  Today's PSA is 1.30.  He has had no flank pain, hematuria dysuria and has a good urinary stream.  He has no new concerns\"    TODAY 7/5/21:   He is on tamsulosin 0.4mg (Flomax)   Doing really well since starting tamsulosin 0.4mg (Flomax)  He is taking a diuretic in the morning    He is having Erectile dysfunction  - tried both tadalafil (Cialis) 20mg and sildenafil 100-120mg (Viagra)   This is going pretty well and he alternates medications and knows not to take them in the same 24 hours    PVR = 55cc    PHYSICAL EXAM  Patient is a 75 year old  male   Vitals: Blood pressure 100/62, pulse 67, height 1.676 m (5' 6\"), weight 82.6 kg (182 lb), SpO2 98 %.  Body mass index is 29.38 kg/m .  General Appearance Adult:   Alert, no acute distress, oriented  HENT: throat/mouth:normal, good dentition  Lungs: no respiratory distress, or pursed lip breathing  Heart: No obvious jugular venous distension present  Abdomen: soft, nontender, no organomegaly or masses  Musculoskeltal: extremities normal, no peripheral edema  Skin: no suspicious lesions or rashes  Neuro: Alert, oriented, speech and " mentation normal  Psych: affect and mood normal  Gait: Normal    Component PSA Diag Urologic Phys   Latest Ref Rng & Units 0.00 - 4.00 ng/mL   4/30/2019 2.50   5/13/2020 1.30         ASSESSMENT and PLAN  75-year-old man with history of BPH with LUTS and erectile dysfunction also with a family history of prostate cancer    BPH with LUTS  -He is doing very well on tamsulosin 0.4 mg daily  -His PVR today is low at 55 cc  -Continue with medical management    Erectile dysfunction  -He does well with sildenafil 100 mg as needed or Cialis 20 mg as needed.  He alternates these medications and knows not to take them in the same 24 hours  -Refill prescription sent to the pharmacy  -Follow-up in 1 year for symptom check    Family history of prostate cancer  -I reviewed his PSA history and trend which is very reassuring  -We discussed that given his age of 75 no further PSA monitoring is needed    He may follow-up in 1 year with my PA Shayy for symptom check    Time spent: 20 minutes spent on the date of the encounter doing chart review, history and exam, documentation and further activities as noted above.    Ike Lora MD   Urology  [facility]  [facility] Phone: [phone]  [facility] Phone: [phone]

## 2021-08-29 ENCOUNTER — HEALTH MAINTENANCE LETTER (OUTPATIENT)
Age: 76
End: 2021-08-29

## 2021-09-20 DIAGNOSIS — N40.0 BPH (BENIGN PROSTATIC HYPERPLASIA): ICD-10-CM

## 2021-09-20 RX ORDER — TAMSULOSIN HYDROCHLORIDE 0.4 MG/1
0.4 CAPSULE ORAL DAILY
Qty: 90 CAPSULE | Refills: 3 | Status: SHIPPED | OUTPATIENT
Start: 2021-09-20 | End: 2022-07-20

## 2021-10-24 ENCOUNTER — HEALTH MAINTENANCE LETTER (OUTPATIENT)
Age: 76
End: 2021-10-24

## 2021-12-19 ENCOUNTER — HEALTH MAINTENANCE LETTER (OUTPATIENT)
Age: 76
End: 2021-12-19

## 2022-02-13 ENCOUNTER — HEALTH MAINTENANCE LETTER (OUTPATIENT)
Age: 77
End: 2022-02-13

## 2022-04-10 ENCOUNTER — HEALTH MAINTENANCE LETTER (OUTPATIENT)
Age: 77
End: 2022-04-10

## 2022-07-20 DIAGNOSIS — N40.0 BPH (BENIGN PROSTATIC HYPERPLASIA): ICD-10-CM

## 2022-07-20 RX ORDER — TAMSULOSIN HYDROCHLORIDE 0.4 MG/1
0.4 CAPSULE ORAL DAILY
Qty: 90 CAPSULE | Refills: 0 | Status: SHIPPED | OUTPATIENT
Start: 2022-07-20 | End: 2022-10-27

## 2022-07-31 ENCOUNTER — HEALTH MAINTENANCE LETTER (OUTPATIENT)
Age: 77
End: 2022-07-31

## 2022-10-16 ENCOUNTER — HEALTH MAINTENANCE LETTER (OUTPATIENT)
Age: 77
End: 2022-10-16

## 2022-10-27 DIAGNOSIS — N40.0 BPH (BENIGN PROSTATIC HYPERPLASIA): ICD-10-CM

## 2022-10-27 RX ORDER — TAMSULOSIN HYDROCHLORIDE 0.4 MG/1
0.4 CAPSULE ORAL DAILY
Qty: 90 CAPSULE | Refills: 0 | Status: SHIPPED | OUTPATIENT
Start: 2022-10-27 | End: 2022-11-02

## 2022-11-02 ENCOUNTER — OFFICE VISIT (OUTPATIENT)
Dept: UROLOGY | Facility: CLINIC | Age: 77
End: 2022-11-02
Payer: MEDICARE

## 2022-11-02 VITALS
SYSTOLIC BLOOD PRESSURE: 112 MMHG | OXYGEN SATURATION: 98 % | HEIGHT: 66 IN | WEIGHT: 186 LBS | BODY MASS INDEX: 29.89 KG/M2 | HEART RATE: 71 BPM | DIASTOLIC BLOOD PRESSURE: 60 MMHG

## 2022-11-02 DIAGNOSIS — R39.15 URINARY URGENCY: ICD-10-CM

## 2022-11-02 DIAGNOSIS — R39.12 BENIGN PROSTATIC HYPERPLASIA WITH WEAK URINARY STREAM: Primary | ICD-10-CM

## 2022-11-02 DIAGNOSIS — N40.1 BENIGN PROSTATIC HYPERPLASIA WITH WEAK URINARY STREAM: Primary | ICD-10-CM

## 2022-11-02 DIAGNOSIS — N52.9 ERECTILE DYSFUNCTION, UNSPECIFIED ERECTILE DYSFUNCTION TYPE: ICD-10-CM

## 2022-11-02 DIAGNOSIS — R35.0 URINARY FREQUENCY: ICD-10-CM

## 2022-11-02 LAB
ALBUMIN UR-MCNC: NEGATIVE MG/DL
APPEARANCE UR: CLEAR
BILIRUB UR QL STRIP: NEGATIVE
COLOR UR AUTO: YELLOW
GLUCOSE UR STRIP-MCNC: >=1000 MG/DL
HGB UR QL STRIP: NEGATIVE
KETONES UR STRIP-MCNC: NEGATIVE MG/DL
LEUKOCYTE ESTERASE UR QL STRIP: NEGATIVE
NITRATE UR QL: NEGATIVE
PH UR STRIP: 5.5 [PH] (ref 5–7)
RESIDUAL VOLUME (RV) (EXTERNAL): 40
SP GR UR STRIP: 1.02 (ref 1–1.03)
UROBILINOGEN UR STRIP-ACNC: 0.2 E.U./DL

## 2022-11-02 PROCEDURE — 81003 URINALYSIS AUTO W/O SCOPE: CPT | Mod: QW | Performed by: UROLOGY

## 2022-11-02 PROCEDURE — 51798 US URINE CAPACITY MEASURE: CPT | Performed by: UROLOGY

## 2022-11-02 PROCEDURE — 99214 OFFICE O/P EST MOD 30 MIN: CPT | Mod: 25 | Performed by: UROLOGY

## 2022-11-02 RX ORDER — SILDENAFIL CITRATE 20 MG/1
100 TABLET ORAL PRN
Qty: 30 TABLET | Refills: 11 | Status: SHIPPED | OUTPATIENT
Start: 2022-11-02 | End: 2022-11-21

## 2022-11-02 RX ORDER — TAMSULOSIN HYDROCHLORIDE 0.4 MG/1
0.4 CAPSULE ORAL DAILY
Qty: 90 CAPSULE | Refills: 3 | Status: SHIPPED | OUTPATIENT
Start: 2022-11-02 | End: 2022-11-02

## 2022-11-02 RX ORDER — TADALAFIL 20 MG/1
20 TABLET ORAL DAILY PRN
Qty: 30 TABLET | Refills: 11 | Status: SHIPPED | OUTPATIENT
Start: 2022-11-02 | End: 2023-10-10

## 2022-11-02 RX ORDER — TAMSULOSIN HYDROCHLORIDE 0.4 MG/1
0.4 CAPSULE ORAL DAILY
Qty: 90 CAPSULE | Refills: 3 | Status: SHIPPED | OUTPATIENT
Start: 2022-11-02 | End: 2023-10-10

## 2022-11-02 ASSESSMENT — PAIN SCALES - GENERAL: PAINLEVEL: NO PAIN (0)

## 2022-11-02 NOTE — PROGRESS NOTES
"[facility]   CHIEF COMPLAINT   It was my pleasure to see Geoffrey Benjamin who is a 75 year old male for follow-up of BPH and family history of prostate cancer .      HPI   Geoffrey Benjamin is a very pleasant 77 year old male who presents with a history of BPH and family history of prostate cancer.    Prior patient of Dr. Sinha - last seen 5/13/20:  \"Mr. Benjamin is a 74-year-old male with a father who had prostate cancer.  Mr. Benjamin had an MRI scan and biopsies of the left apex and left mid gland that were benign.  His PSA was 2.5.  Today's PSA is 1.30.  He has had no flank pain, hematuria dysuria and has a good urinary stream.  He has no new concerns\"    7/5/21:   He is on tamsulosin 0.4mg (Flomax)   Doing really well since starting tamsulosin 0.4mg (Flomax)  He is taking a diuretic in the morning    He is having Erectile dysfunction  - tried both tadalafil (Cialis) 20mg and sildenafil 100-120mg (Viagra)   This is going pretty well and he alternates medications and knows not to take them in the same 24 hours    PVR = 55cc    TODAY 11/2/2022:  Follow-up today  He is doing very well on tamsulosin  He continues to have urinary urgency and frequency, especially after taking his diuretic or when he drinks a large glass of water  Overall not very bothered by this    He continues to have good results with either tadalafil 20 mg or sildenafil 100 mg  He alternates medications and does not take them in the same 24 hours    AUASS: 1-5-3-5-1-1-1/2 = 17/18  QOL = 2  PVR = 40cc    PHYSICAL EXAM  Patient is a 77 year old  male   Vitals: Blood pressure 112/60, pulse 71, height 1.676 m (5' 6\"), weight 84.4 kg (186 lb), SpO2 98 %.  Body mass index is 30.02 kg/m .  General Appearance Adult:   Alert, no acute distress, oriented  HENT: throat/mouth:normal, good dentition  Lungs: no respiratory distress, or pursed lip breathing  Heart: No obvious jugular venous distension present  Abdomen: soft, nontender, no organomegaly or " masses  Musculoskeltal: extremities normal, no peripheral edema  Skin: no suspicious lesions or rashes  Neuro: Alert, oriented, speech and mentation normal  Psych: affect and mood normal  Gait: Normal    Component PSA Diag Urologic Phys   Latest Ref Rng & Units 0.00 - 4.00 ng/mL   4/30/2019 2.50   5/13/2020 1.30         ASSESSMENT and PLAN  77-year-old man with history of BPH with LUTS and erectile dysfunction also with a family history of prostate cancer    BPH with LUTS  -He is doing very well on tamsulosin 0.4 mg daily  -Refill prescription sent to the pharmacy  - Continue with medical management    Erectile dysfunction  -He does well with sildenafil 100 mg as needed or Cialis 20 mg as needed.  He alternates these medications and knows not to take them in the same 24 hours  -Refill prescription sent to the pharmacy  -Follow-up in 1 year for symptom check    Family history of prostate cancer  -I reviewed his PSA history and trend which is very reassuring  -We discussed that given his age of 75 no further PSA monitoring is needed    He may follow-up in 1 year with my PA Shayy for symptom check      Time spent: 20 minutes spent on the date of the encounter doing chart review, history and exam, documentation and further activities as noted above.    Ike Lora MD   Urology  [facility]  [facility] Phone: [phone]  [facility] Phone: [phone]

## 2022-11-02 NOTE — LETTER
"11/2/2022       RE: Geoffrey Benjamin  [address] [id number]     Dear Colleague,    Thank you for referring your patient, Geoffrey Benjamin, to the [facility] at [facility]. Please see a copy of my visit note below.    [place]   CHIEF COMPLAINT   It was my pleasure to see Geoffrey Benjamin who is a 75 year old male for follow-up of BPH and family history of prostate cancer .      HPI   Geoffrey Benjamin is a very pleasant 77 year old male who presents with a history of BPH and family history of prostate cancer.    Prior patient of Dr. Sinha - last seen 5/13/20:  \"Mr. Benjamin is a 74-year-old male with a father who had prostate cancer.  Mr. Benjamin had an MRI scan and biopsies of the left apex and left mid gland that were benign.  His PSA was 2.5.  Today's PSA is 1.30.  He has had no flank pain, hematuria dysuria and has a good urinary stream.  He has no new concerns\"    7/5/21:   He is on tamsulosin 0.4mg (Flomax)   Doing really well since starting tamsulosin 0.4mg (Flomax)  He is taking a diuretic in the morning    He is having Erectile dysfunction  - tried both tadalafil (Cialis) 20mg and sildenafil 100-120mg (Viagra)   This is going pretty well and he alternates medications and knows not to take them in the same 24 hours    PVR = 55cc    TODAY 11/2/2022:  Follow-up today  He is doing very well on tamsulosin  He continues to have urinary urgency and frequency, especially after taking his diuretic or when he drinks a large glass of water  Overall not very bothered by this    He continues to have good results with either tadalafil 20 mg or sildenafil 100 mg  He alternates medications and does not take them in the same 24 hours    AUASS: 1-5-3-5-1-1-1/2 = 17/18  QOL = 2  PVR = 40cc    PHYSICAL EXAM  Patient is a 77 year old  male   Vitals: Blood pressure 112/60, pulse 71, height 1.676 m (5' 6\"), weight 84.4 kg (186 lb), " SpO2 98 %.  Body mass index is 30.02 kg/m .  General Appearance Adult:   Alert, no acute distress, oriented  HENT: throat/mouth:normal, good dentition  Lungs: no respiratory distress, or pursed lip breathing  Heart: No obvious jugular venous distension present  Abdomen: soft, nontender, no organomegaly or masses  Musculoskeltal: extremities normal, no peripheral edema  Skin: no suspicious lesions or rashes  Neuro: Alert, oriented, speech and mentation normal  Psych: affect and mood normal  Gait: Normal    Component PSA Diag Urologic Phys   Latest Ref Rng & Units 0.00 - 4.00 ng/mL   4/30/2019 2.50   5/13/2020 1.30         ASSESSMENT and PLAN  77-year-old man with history of BPH with LUTS and erectile dysfunction also with a family history of prostate cancer    BPH with LUTS  -He is doing very well on tamsulosin 0.4 mg daily  -Refill prescription sent to the pharmacy  - Continue with medical management    Erectile dysfunction  -He does well with sildenafil 100 mg as needed or Cialis 20 mg as needed.  He alternates these medications and knows not to take them in the same 24 hours  -Refill prescription sent to the pharmacy  -Follow-up in 1 year for symptom check    Family history of prostate cancer  -I reviewed his PSA history and trend which is very reassuring  -We discussed that given his age of 75 no further PSA monitoring is needed    He may follow-up in 1 year with my PA Shayy for symptom check      Time spent: 20 minutes spent on the date of the encounter doing chart review, history and exam, documentation and further activities as noted above.    Ike Lora MD   Urology  [facility]  [facility] Phone: [phone]  [facility] Phone: [phone]

## 2022-11-02 NOTE — NURSING NOTE
Chief Complaint   Patient presents with     Benign Prostatic Hypertrophy     With weak urinary stream   Patient c/o frequency. Patients PVR was 40 ml.  Marly Bowden LPN

## 2022-11-18 ENCOUNTER — LAB (OUTPATIENT)
Dept: URGENT CARE | Facility: URGENT CARE | Age: 77
End: 2022-11-18
Payer: MEDICARE

## 2022-11-18 DIAGNOSIS — Z20.822 ENCOUNTER FOR LABORATORY TESTING FOR COVID-19 VIRUS: ICD-10-CM

## 2022-11-18 LAB — SARS-COV-2 RNA RESP QL NAA+PROBE: NEGATIVE

## 2022-11-18 PROCEDURE — U0003 INFECTIOUS AGENT DETECTION BY NUCLEIC ACID (DNA OR RNA); SEVERE ACUTE RESPIRATORY SYNDROME CORONAVIRUS 2 (SARS-COV-2) (CORONAVIRUS DISEASE [COVID-19]), AMPLIFIED PROBE TECHNIQUE, MAKING USE OF HIGH THROUGHPUT TECHNOLOGIES AS DESCRIBED BY CMS-2020-01-R: HCPCS

## 2022-11-18 PROCEDURE — U0005 INFEC AGEN DETEC AMPLI PROBE: HCPCS

## 2022-11-21 ENCOUNTER — HOSPITAL ENCOUNTER (OUTPATIENT)
Facility: CLINIC | Age: 77
Discharge: HOME | End: 2022-11-22
Attending: ORTHOPAEDIC SURGERY | Admitting: ORTHOPAEDIC SURGERY
Payer: MEDICARE

## 2022-11-21 ENCOUNTER — APPOINTMENT (OUTPATIENT)
Dept: GENERAL RADIOLOGY | Facility: CLINIC | Age: 77
End: 2022-11-21
Attending: ORTHOPAEDIC SURGERY
Payer: MEDICARE

## 2022-11-21 ENCOUNTER — ANESTHESIA EVENT (OUTPATIENT)
Dept: SURGERY | Facility: CLINIC | Age: 77
End: 2022-11-21
Payer: MEDICARE

## 2022-11-21 ENCOUNTER — ANESTHESIA (OUTPATIENT)
Dept: SURGERY | Facility: CLINIC | Age: 77
End: 2022-11-21
Payer: MEDICARE

## 2022-11-21 DIAGNOSIS — M48.062 SPINAL STENOSIS OF LUMBAR REGION WITH NEUROGENIC CLAUDICATION: Primary | ICD-10-CM

## 2022-11-21 DIAGNOSIS — Z96.651 S/P TOTAL KNEE ARTHROPLASTY, RIGHT: ICD-10-CM

## 2022-11-21 LAB
FASTING STATUS PATIENT QL REPORTED: YES
GLUCOSE BLD-MCNC: 148 MG/DL (ref 70–99)
GLUCOSE BLDC GLUCOMTR-MCNC: 113 MG/DL (ref 70–99)
GLUCOSE BLDC GLUCOMTR-MCNC: 219 MG/DL (ref 70–99)
POTASSIUM BLD-SCNC: 3.5 MMOL/L (ref 3.4–5.3)

## 2022-11-21 PROCEDURE — 250N000009 HC RX 250: Performed by: ANESTHESIOLOGY

## 2022-11-21 PROCEDURE — 96372 THER/PROPH/DIAG INJ SC/IM: CPT | Mod: 59 | Performed by: PHYSICIAN ASSISTANT

## 2022-11-21 PROCEDURE — 99207 PR NO BILLABLE SERVICE THIS VISIT: CPT | Performed by: PHYSICIAN ASSISTANT

## 2022-11-21 PROCEDURE — 250N000011 HC RX IP 250 OP 636: Performed by: ORTHOPAEDIC SURGERY

## 2022-11-21 PROCEDURE — 82962 GLUCOSE BLOOD TEST: CPT | Mod: 91

## 2022-11-21 PROCEDURE — 999N000141 HC STATISTIC PRE-PROCEDURE NURSING ASSESSMENT: Performed by: ORTHOPAEDIC SURGERY

## 2022-11-21 PROCEDURE — 250N000011 HC RX IP 250 OP 636: Performed by: ANESTHESIOLOGY

## 2022-11-21 PROCEDURE — 258N000003 HC RX IP 258 OP 636: Performed by: ANESTHESIOLOGY

## 2022-11-21 PROCEDURE — 258N000003 HC RX IP 258 OP 636

## 2022-11-21 PROCEDURE — 36415 COLL VENOUS BLD VENIPUNCTURE: CPT | Performed by: ANESTHESIOLOGY

## 2022-11-21 PROCEDURE — 82947 ASSAY GLUCOSE BLOOD QUANT: CPT | Mod: 59 | Performed by: ANESTHESIOLOGY

## 2022-11-21 PROCEDURE — 250N000011 HC RX IP 250 OP 636: Performed by: NURSE ANESTHETIST, CERTIFIED REGISTERED

## 2022-11-21 PROCEDURE — 250N000009 HC RX 250: Performed by: ORTHOPAEDIC SURGERY

## 2022-11-21 PROCEDURE — 710N000009 HC RECOVERY PHASE 1, LEVEL 1, PER MIN: Performed by: ORTHOPAEDIC SURGERY

## 2022-11-21 PROCEDURE — 250N000012 HC RX MED GY IP 250 OP 636 PS 637: Performed by: PHYSICIAN ASSISTANT

## 2022-11-21 PROCEDURE — 84132 ASSAY OF SERUM POTASSIUM: CPT | Performed by: ANESTHESIOLOGY

## 2022-11-21 PROCEDURE — 258N000003 HC RX IP 258 OP 636: Performed by: ORTHOPAEDIC SURGERY

## 2022-11-21 PROCEDURE — 999N000179 XR SURGERY CARM FLUORO LESS THAN 5 MIN W STILLS

## 2022-11-21 PROCEDURE — 272N000001 HC OR GENERAL SUPPLY STERILE: Performed by: ORTHOPAEDIC SURGERY

## 2022-11-21 PROCEDURE — 250N000025 HC SEVOFLURANE, PER MIN: Performed by: ORTHOPAEDIC SURGERY

## 2022-11-21 PROCEDURE — 360N000084 HC SURGERY LEVEL 4 W/ FLUORO, PER MIN: Performed by: ORTHOPAEDIC SURGERY

## 2022-11-21 PROCEDURE — 250N000013 HC RX MED GY IP 250 OP 250 PS 637: Performed by: ORTHOPAEDIC SURGERY

## 2022-11-21 PROCEDURE — 370N000017 HC ANESTHESIA TECHNICAL FEE, PER MIN: Performed by: ORTHOPAEDIC SURGERY

## 2022-11-21 RX ORDER — CEFAZOLIN SODIUM/WATER 2 G/20 ML
2 SYRINGE (ML) INTRAVENOUS
Status: COMPLETED | OUTPATIENT
Start: 2022-11-21 | End: 2022-11-21

## 2022-11-21 RX ORDER — CALCIUM CARBONATE 500 MG/1
500 TABLET, CHEWABLE ORAL 4 TIMES DAILY PRN
Status: DISCONTINUED | OUTPATIENT
Start: 2022-11-21 | End: 2022-11-22 | Stop reason: HOSPADM

## 2022-11-21 RX ORDER — HYDROMORPHONE HCL IN WATER/PF 6 MG/30 ML
0.4 PATIENT CONTROLLED ANALGESIA SYRINGE INTRAVENOUS
Status: DISCONTINUED | OUTPATIENT
Start: 2022-11-21 | End: 2022-11-22 | Stop reason: HOSPADM

## 2022-11-21 RX ORDER — HYDROMORPHONE HCL IN WATER/PF 6 MG/30 ML
0.2 PATIENT CONTROLLED ANALGESIA SYRINGE INTRAVENOUS
Status: DISCONTINUED | OUTPATIENT
Start: 2022-11-21 | End: 2022-11-22 | Stop reason: HOSPADM

## 2022-11-21 RX ORDER — NALOXONE HYDROCHLORIDE 0.4 MG/ML
0.2 INJECTION, SOLUTION INTRAMUSCULAR; INTRAVENOUS; SUBCUTANEOUS
Status: DISCONTINUED | OUTPATIENT
Start: 2022-11-21 | End: 2022-11-22 | Stop reason: HOSPADM

## 2022-11-21 RX ORDER — AMOXICILLIN 250 MG
1 CAPSULE ORAL 2 TIMES DAILY
Status: DISCONTINUED | OUTPATIENT
Start: 2022-11-21 | End: 2022-11-22 | Stop reason: HOSPADM

## 2022-11-21 RX ORDER — HYDROXYZINE HYDROCHLORIDE 10 MG/1
10 TABLET, FILM COATED ORAL EVERY 6 HOURS PRN
Status: DISCONTINUED | OUTPATIENT
Start: 2022-11-21 | End: 2022-11-22 | Stop reason: HOSPADM

## 2022-11-21 RX ORDER — ATORVASTATIN CALCIUM 10 MG/1
10 TABLET, FILM COATED ORAL AT BEDTIME
Status: DISCONTINUED | OUTPATIENT
Start: 2022-11-21 | End: 2022-11-22 | Stop reason: HOSPADM

## 2022-11-21 RX ORDER — POLYETHYLENE GLYCOL 3350 17 G/17G
17 POWDER, FOR SOLUTION ORAL DAILY
Status: DISCONTINUED | OUTPATIENT
Start: 2022-11-22 | End: 2022-11-22 | Stop reason: HOSPADM

## 2022-11-21 RX ORDER — CEFAZOLIN SODIUM/WATER 2 G/20 ML
2 SYRINGE (ML) INTRAVENOUS SEE ADMIN INSTRUCTIONS
Status: DISCONTINUED | OUTPATIENT
Start: 2022-11-21 | End: 2022-11-21 | Stop reason: HOSPADM

## 2022-11-21 RX ORDER — FENTANYL CITRATE 0.05 MG/ML
50 INJECTION, SOLUTION INTRAMUSCULAR; INTRAVENOUS EVERY 5 MIN PRN
Status: DISCONTINUED | OUTPATIENT
Start: 2022-11-21 | End: 2022-11-21 | Stop reason: HOSPADM

## 2022-11-21 RX ORDER — NEOSTIGMINE METHYLSULFATE 1 MG/ML
VIAL (ML) INJECTION PRN
Status: DISCONTINUED | OUTPATIENT
Start: 2022-11-21 | End: 2022-11-21

## 2022-11-21 RX ORDER — ONDANSETRON 4 MG/1
4 TABLET, ORALLY DISINTEGRATING ORAL EVERY 6 HOURS PRN
Status: DISCONTINUED | OUTPATIENT
Start: 2022-11-21 | End: 2022-11-22 | Stop reason: HOSPADM

## 2022-11-21 RX ORDER — BUPIVACAINE HYDROCHLORIDE AND EPINEPHRINE 5; 5 MG/ML; UG/ML
INJECTION, SOLUTION PERINEURAL PRN
Status: DISCONTINUED | OUTPATIENT
Start: 2022-11-21 | End: 2022-11-21 | Stop reason: HOSPADM

## 2022-11-21 RX ORDER — BISACODYL 10 MG
10 SUPPOSITORY, RECTAL RECTAL DAILY PRN
Status: DISCONTINUED | OUTPATIENT
Start: 2022-11-21 | End: 2022-11-22 | Stop reason: HOSPADM

## 2022-11-21 RX ORDER — EPHEDRINE SULFATE 50 MG/ML
INJECTION, SOLUTION INTRAMUSCULAR; INTRAVENOUS; SUBCUTANEOUS PRN
Status: DISCONTINUED | OUTPATIENT
Start: 2022-11-21 | End: 2022-11-21

## 2022-11-21 RX ORDER — SODIUM CHLORIDE, SODIUM LACTATE, POTASSIUM CHLORIDE, CALCIUM CHLORIDE 600; 310; 30; 20 MG/100ML; MG/100ML; MG/100ML; MG/100ML
INJECTION, SOLUTION INTRAVENOUS CONTINUOUS
Status: DISCONTINUED | OUTPATIENT
Start: 2022-11-21 | End: 2022-11-22 | Stop reason: HOSPADM

## 2022-11-21 RX ORDER — OXYCODONE HYDROCHLORIDE 5 MG/1
5 TABLET ORAL EVERY 4 HOURS PRN
Status: DISCONTINUED | OUTPATIENT
Start: 2022-11-21 | End: 2022-11-22 | Stop reason: HOSPADM

## 2022-11-21 RX ORDER — PROCHLORPERAZINE MALEATE 5 MG
5 TABLET ORAL EVERY 6 HOURS PRN
Status: DISCONTINUED | OUTPATIENT
Start: 2022-11-21 | End: 2022-11-22 | Stop reason: HOSPADM

## 2022-11-21 RX ORDER — METHOCARBAMOL 500 MG/1
500 TABLET, FILM COATED ORAL EVERY 6 HOURS PRN
Status: DISCONTINUED | OUTPATIENT
Start: 2022-11-21 | End: 2022-11-22 | Stop reason: HOSPADM

## 2022-11-21 RX ORDER — ONDANSETRON 2 MG/ML
4 INJECTION INTRAMUSCULAR; INTRAVENOUS EVERY 30 MIN PRN
Status: DISCONTINUED | OUTPATIENT
Start: 2022-11-21 | End: 2022-11-21 | Stop reason: HOSPADM

## 2022-11-21 RX ORDER — MAGNESIUM HYDROXIDE 1200 MG/15ML
LIQUID ORAL PRN
Status: DISCONTINUED | OUTPATIENT
Start: 2022-11-21 | End: 2022-11-21 | Stop reason: HOSPADM

## 2022-11-21 RX ORDER — MEPERIDINE HYDROCHLORIDE 25 MG/ML
12.5 INJECTION INTRAMUSCULAR; INTRAVENOUS; SUBCUTANEOUS
Status: DISCONTINUED | OUTPATIENT
Start: 2022-11-21 | End: 2022-11-21 | Stop reason: HOSPADM

## 2022-11-21 RX ORDER — SODIUM CHLORIDE, SODIUM LACTATE, POTASSIUM CHLORIDE, CALCIUM CHLORIDE 600; 310; 30; 20 MG/100ML; MG/100ML; MG/100ML; MG/100ML
INJECTION, SOLUTION INTRAVENOUS CONTINUOUS
Status: DISCONTINUED | OUTPATIENT
Start: 2022-11-21 | End: 2022-11-21 | Stop reason: HOSPADM

## 2022-11-21 RX ORDER — ACETAMINOPHEN 325 MG/1
975 TABLET ORAL EVERY 8 HOURS
Status: DISCONTINUED | OUTPATIENT
Start: 2022-11-21 | End: 2022-11-22 | Stop reason: HOSPADM

## 2022-11-21 RX ORDER — METOPROLOL SUCCINATE 50 MG/1
50 TABLET, EXTENDED RELEASE ORAL DAILY
Status: DISCONTINUED | OUTPATIENT
Start: 2022-11-22 | End: 2022-11-22 | Stop reason: HOSPADM

## 2022-11-21 RX ORDER — FENTANYL CITRATE 0.05 MG/ML
25 INJECTION, SOLUTION INTRAMUSCULAR; INTRAVENOUS
Status: DISCONTINUED | OUTPATIENT
Start: 2022-11-21 | End: 2022-11-21 | Stop reason: HOSPADM

## 2022-11-21 RX ORDER — TAMSULOSIN HYDROCHLORIDE 0.4 MG/1
0.4 CAPSULE ORAL DAILY
Status: DISCONTINUED | OUTPATIENT
Start: 2022-11-21 | End: 2022-11-22 | Stop reason: HOSPADM

## 2022-11-21 RX ORDER — FENTANYL CITRATE 0.05 MG/ML
25 INJECTION, SOLUTION INTRAMUSCULAR; INTRAVENOUS EVERY 5 MIN PRN
Status: DISCONTINUED | OUTPATIENT
Start: 2022-11-21 | End: 2022-11-21 | Stop reason: HOSPADM

## 2022-11-21 RX ORDER — LIDOCAINE HYDROCHLORIDE 20 MG/ML
INJECTION, SOLUTION INFILTRATION; PERINEURAL PRN
Status: DISCONTINUED | OUTPATIENT
Start: 2022-11-21 | End: 2022-11-21

## 2022-11-21 RX ORDER — ONDANSETRON 2 MG/ML
4 INJECTION INTRAMUSCULAR; INTRAVENOUS EVERY 6 HOURS PRN
Status: DISCONTINUED | OUTPATIENT
Start: 2022-11-21 | End: 2022-11-22 | Stop reason: HOSPADM

## 2022-11-21 RX ORDER — SODIUM CHLORIDE, SODIUM LACTATE, POTASSIUM CHLORIDE, CALCIUM CHLORIDE 600; 310; 30; 20 MG/100ML; MG/100ML; MG/100ML; MG/100ML
INJECTION, SOLUTION INTRAVENOUS CONTINUOUS PRN
Status: DISCONTINUED | OUTPATIENT
Start: 2022-11-21 | End: 2022-11-21

## 2022-11-21 RX ORDER — LIDOCAINE 40 MG/G
CREAM TOPICAL
Status: DISCONTINUED | OUTPATIENT
Start: 2022-11-21 | End: 2022-11-21 | Stop reason: HOSPADM

## 2022-11-21 RX ORDER — ACETAMINOPHEN 325 MG/1
650 TABLET ORAL EVERY 4 HOURS PRN
Status: DISCONTINUED | OUTPATIENT
Start: 2022-11-24 | End: 2022-11-22 | Stop reason: HOSPADM

## 2022-11-21 RX ORDER — ONDANSETRON 2 MG/ML
INJECTION INTRAMUSCULAR; INTRAVENOUS PRN
Status: DISCONTINUED | OUTPATIENT
Start: 2022-11-21 | End: 2022-11-21

## 2022-11-21 RX ORDER — NALOXONE HYDROCHLORIDE 0.4 MG/ML
0.4 INJECTION, SOLUTION INTRAMUSCULAR; INTRAVENOUS; SUBCUTANEOUS
Status: DISCONTINUED | OUTPATIENT
Start: 2022-11-21 | End: 2022-11-22 | Stop reason: HOSPADM

## 2022-11-21 RX ORDER — DEXAMETHASONE SODIUM PHOSPHATE 4 MG/ML
INJECTION, SOLUTION INTRA-ARTICULAR; INTRALESIONAL; INTRAMUSCULAR; INTRAVENOUS; SOFT TISSUE PRN
Status: DISCONTINUED | OUTPATIENT
Start: 2022-11-21 | End: 2022-11-21

## 2022-11-21 RX ORDER — HYDROMORPHONE HCL IN WATER/PF 6 MG/30 ML
0.4 PATIENT CONTROLLED ANALGESIA SYRINGE INTRAVENOUS EVERY 5 MIN PRN
Status: DISCONTINUED | OUTPATIENT
Start: 2022-11-21 | End: 2022-11-21 | Stop reason: HOSPADM

## 2022-11-21 RX ORDER — AMOXICILLIN 500 MG/1
2000 CAPSULE ORAL
COMMUNITY

## 2022-11-21 RX ORDER — PROPOFOL 10 MG/ML
INJECTION, EMULSION INTRAVENOUS CONTINUOUS PRN
Status: DISCONTINUED | OUTPATIENT
Start: 2022-11-21 | End: 2022-11-21

## 2022-11-21 RX ORDER — LIDOCAINE 40 MG/G
CREAM TOPICAL
Status: DISCONTINUED | OUTPATIENT
Start: 2022-11-21 | End: 2022-11-22 | Stop reason: HOSPADM

## 2022-11-21 RX ORDER — HYDROMORPHONE HCL IN WATER/PF 6 MG/30 ML
0.2 PATIENT CONTROLLED ANALGESIA SYRINGE INTRAVENOUS EVERY 5 MIN PRN
Status: DISCONTINUED | OUTPATIENT
Start: 2022-11-21 | End: 2022-11-21 | Stop reason: HOSPADM

## 2022-11-21 RX ORDER — GLYCOPYRROLATE 0.2 MG/ML
INJECTION, SOLUTION INTRAMUSCULAR; INTRAVENOUS PRN
Status: DISCONTINUED | OUTPATIENT
Start: 2022-11-21 | End: 2022-11-21

## 2022-11-21 RX ORDER — PROPOFOL 10 MG/ML
INJECTION, EMULSION INTRAVENOUS PRN
Status: DISCONTINUED | OUTPATIENT
Start: 2022-11-21 | End: 2022-11-21

## 2022-11-21 RX ORDER — NICOTINE POLACRILEX 4 MG
15-30 LOZENGE BUCCAL
Status: DISCONTINUED | OUTPATIENT
Start: 2022-11-21 | End: 2022-11-22 | Stop reason: HOSPADM

## 2022-11-21 RX ORDER — ACETAMINOPHEN 325 MG/1
975 TABLET ORAL ONCE
Status: COMPLETED | OUTPATIENT
Start: 2022-11-21 | End: 2022-11-21

## 2022-11-21 RX ORDER — OXYCODONE HYDROCHLORIDE 5 MG/1
10 TABLET ORAL EVERY 4 HOURS PRN
Status: DISCONTINUED | OUTPATIENT
Start: 2022-11-21 | End: 2022-11-22 | Stop reason: HOSPADM

## 2022-11-21 RX ORDER — FENTANYL CITRATE 50 UG/ML
INJECTION, SOLUTION INTRAMUSCULAR; INTRAVENOUS PRN
Status: DISCONTINUED | OUTPATIENT
Start: 2022-11-21 | End: 2022-11-21

## 2022-11-21 RX ORDER — ONDANSETRON 4 MG/1
4 TABLET, ORALLY DISINTEGRATING ORAL EVERY 30 MIN PRN
Status: DISCONTINUED | OUTPATIENT
Start: 2022-11-21 | End: 2022-11-21 | Stop reason: HOSPADM

## 2022-11-21 RX ORDER — DEXTROSE MONOHYDRATE 25 G/50ML
25-50 INJECTION, SOLUTION INTRAVENOUS
Status: DISCONTINUED | OUTPATIENT
Start: 2022-11-21 | End: 2022-11-22 | Stop reason: HOSPADM

## 2022-11-21 RX ADMIN — SODIUM CHLORIDE, POTASSIUM CHLORIDE, SODIUM LACTATE AND CALCIUM CHLORIDE: 600; 310; 30; 20 INJECTION, SOLUTION INTRAVENOUS at 16:05

## 2022-11-21 RX ADMIN — NEOSTIGMINE METHYLSULFATE 4 MG: 1 INJECTION, SOLUTION INTRAVENOUS at 13:31

## 2022-11-21 RX ADMIN — ATORVASTATIN CALCIUM 10 MG: 10 TABLET, FILM COATED ORAL at 21:44

## 2022-11-21 RX ADMIN — TAMSULOSIN HYDROCHLORIDE 0.4 MG: 0.4 CAPSULE ORAL at 20:50

## 2022-11-21 RX ADMIN — METHOCARBAMOL 500 MG: 500 TABLET ORAL at 22:41

## 2022-11-21 RX ADMIN — Medication 10 MG: at 12:43

## 2022-11-21 RX ADMIN — Medication 5 MG: at 12:55

## 2022-11-21 RX ADMIN — PHENYLEPHRINE HYDROCHLORIDE 100 MCG: 10 INJECTION INTRAVENOUS at 12:13

## 2022-11-21 RX ADMIN — ACETAMINOPHEN 975 MG: 325 TABLET, FILM COATED ORAL at 08:25

## 2022-11-21 RX ADMIN — OXYCODONE HYDROCHLORIDE 5 MG: 5 TABLET ORAL at 16:05

## 2022-11-21 RX ADMIN — SODIUM CHLORIDE, POTASSIUM CHLORIDE, SODIUM LACTATE AND CALCIUM CHLORIDE: 600; 310; 30; 20 INJECTION, SOLUTION INTRAVENOUS at 12:55

## 2022-11-21 RX ADMIN — DEXAMETHASONE SODIUM PHOSPHATE 4 MG: 4 INJECTION, SOLUTION INTRA-ARTICULAR; INTRALESIONAL; INTRAMUSCULAR; INTRAVENOUS; SOFT TISSUE at 12:07

## 2022-11-21 RX ADMIN — PHENYLEPHRINE HYDROCHLORIDE 100 MCG: 10 INJECTION INTRAVENOUS at 12:21

## 2022-11-21 RX ADMIN — LIDOCAINE HYDROCHLORIDE 60 MG: 20 INJECTION, SOLUTION INFILTRATION; PERINEURAL at 11:40

## 2022-11-21 RX ADMIN — INSULIN GLARGINE 15 UNITS: 100 INJECTION, SOLUTION SUBCUTANEOUS at 22:42

## 2022-11-21 RX ADMIN — ROCURONIUM BROMIDE 50 MG: 50 INJECTION, SOLUTION INTRAVENOUS at 11:40

## 2022-11-21 RX ADMIN — PROPOFOL 130 MG: 10 INJECTION, EMULSION INTRAVENOUS at 11:40

## 2022-11-21 RX ADMIN — OXYCODONE HYDROCHLORIDE 5 MG: 5 TABLET ORAL at 22:41

## 2022-11-21 RX ADMIN — PROPOFOL 25 MCG/KG/MIN: 10 INJECTION, EMULSION INTRAVENOUS at 11:46

## 2022-11-21 RX ADMIN — FENTANYL CITRATE 100 MCG: 50 INJECTION, SOLUTION INTRAMUSCULAR; INTRAVENOUS at 11:40

## 2022-11-21 RX ADMIN — SODIUM CHLORIDE, POTASSIUM CHLORIDE, SODIUM LACTATE AND CALCIUM CHLORIDE: 600; 310; 30; 20 INJECTION, SOLUTION INTRAVENOUS at 11:08

## 2022-11-21 RX ADMIN — SENNOSIDES AND DOCUSATE SODIUM 1 TABLET: 50; 8.6 TABLET ORAL at 20:50

## 2022-11-21 RX ADMIN — PHENYLEPHRINE HYDROCHLORIDE 0.3 MCG/KG/MIN: 10 INJECTION INTRAVENOUS at 12:13

## 2022-11-21 RX ADMIN — ACETAMINOPHEN 975 MG: 325 TABLET, FILM COATED ORAL at 16:05

## 2022-11-21 RX ADMIN — PHENYLEPHRINE HYDROCHLORIDE 100 MCG: 10 INJECTION INTRAVENOUS at 12:01

## 2022-11-21 RX ADMIN — Medication 2 G: at 11:30

## 2022-11-21 RX ADMIN — PHENYLEPHRINE HYDROCHLORIDE 100 MCG: 10 INJECTION INTRAVENOUS at 11:49

## 2022-11-21 RX ADMIN — GLYCOPYRROLATE 0.6 MG: 0.2 INJECTION, SOLUTION INTRAMUSCULAR; INTRAVENOUS at 13:30

## 2022-11-21 RX ADMIN — SODIUM CHLORIDE, POTASSIUM CHLORIDE, SODIUM LACTATE AND CALCIUM CHLORIDE: 600; 310; 30; 20 INJECTION, SOLUTION INTRAVENOUS at 15:18

## 2022-11-21 RX ADMIN — SODIUM CHLORIDE, POTASSIUM CHLORIDE, SODIUM LACTATE AND CALCIUM CHLORIDE: 600; 310; 30; 20 INJECTION, SOLUTION INTRAVENOUS at 20:54

## 2022-11-21 RX ADMIN — Medication 5 MG: at 12:46

## 2022-11-21 RX ADMIN — ONDANSETRON 4 MG: 2 INJECTION INTRAMUSCULAR; INTRAVENOUS at 13:01

## 2022-11-21 RX ADMIN — Medication 5 MG: at 13:03

## 2022-11-21 ASSESSMENT — ACTIVITIES OF DAILY LIVING (ADL)
ADLS_ACUITY_SCORE: 27
ADLS_ACUITY_SCORE: 22
ADLS_ACUITY_SCORE: 22
ADLS_ACUITY_SCORE: 27
ADLS_ACUITY_SCORE: 22
ADLS_ACUITY_SCORE: 22
ADLS_ACUITY_SCORE: 27
ADLS_ACUITY_SCORE: 27

## 2022-11-21 ASSESSMENT — ENCOUNTER SYMPTOMS: SEIZURES: 0

## 2022-11-21 ASSESSMENT — LIFESTYLE VARIABLES: TOBACCO_USE: 0

## 2022-11-21 NOTE — PROGRESS NOTES
The patient PVR was 306mL; the patient is requesting not to have a straight cath and to try to void again.

## 2022-11-21 NOTE — ANESTHESIA CARE TRANSFER NOTE
Patient: Geoffrey Benjamin    Procedure: Procedure(s):  BILATERAL LUMBAR 3 TO LUMBAR 4 LAMINOTOMY       Diagnosis: Spinal stenosis, lumbar region, without neurogenic claudication [M48.061]  Diagnosis Additional Information: No value filed.    Anesthesia Type:   General     Note:    Oropharynx: oropharynx clear of all foreign objects  Level of Consciousness: awake  Oxygen Supplementation: face mask  Level of Supplemental Oxygen (L/min / FiO2): 6  Independent Airway: airway patency satisfactory and stable  Dentition: dentition unchanged  Vital Signs Stable: post-procedure vital signs reviewed and stable  Report to RN Given: handoff report given  Patient transferred to: PACU    Handoff Report: Identifed the Patient, Identified the Reponsible Provider, Reviewed the pertinent medical history, Discussed the surgical course, Reviewed Intra-OP anesthesia mangement and issues during anesthesia, Set expectations for post-procedure period and Allowed opportunity for questions and acknowledgement of understanding      Vitals:  Vitals Value Taken Time   BP     Temp     Pulse     Resp 20 11/21/22 1339   SpO2 99 % 11/21/22 1339   Vitals shown include unvalidated device data.    Electronically Signed By: LUCILLE Birmingham CRNA  November 21, 2022  1:41 PM

## 2022-11-21 NOTE — CONSULTS
Brief IM consult for medical co-mngt.  Patient not formally seen, anticipated to discharge tmrw after elective procedure November 21, 2022.    Hospitalist consult received for Geoffrey Benjamin is a 77 year old male with PMHx GERD, acne rosacea, hypothyroidism, BPH, DM2, HLD, HTN, and CAD s/p prior CABG who is admitted under the care of the orthopedics service for post op mngt following an elective bilateral L3-L4 laminotomy. EBL 30ml.    Preop H&P and chart are reviewed. Vitals stable and wnl with more recent soft . No apparent acute concerns.    S/p bilateral L3-L4 laminotomy (11/21/2022)  EBL 30.  Preop hemoglobin 15.3.  - Postoperative management per orthopedics.  - Resume ASA when OK with ortho spine.     CAD s/p 4v-CABG (2006).  HTN / DLD.  Follows with I cardiology through [facility].  [PTA: ASA 325mg, atorvastatin 10mg QHS, metoprolol succinate 50mg, HCTZ 25mg]  - Continue PTA regimen with exception HCTZ given soft SBP and hold parameters entered for beta blocker.  - Resume ASA when OK with ortho spine.     DM2.  A1C 8.3. Follows with endo.  [PTA: empagliflozin 10mg, glimepiride 6mg, metformin 1500mg evenings, Lantus 20u QHS]  - Hold PO agents.  - Continue reduced dose Lantus 15u QHS.  - Monitor BG QID AC HS and medium SSI available.  - Mod carb diet.     Acne rosacea. Resume Minocycline at outpatient discharge.     Hypothyroidism.  Resume Levothyroxine at outpatient discharge.     GERD.  Resume PPI at outpatient discharge.     BPH.  Cont PTA Tamsulosin.    Hold formal consult at this time.  No anticipated changes to PTA meds at discharge. The hospitalist service will follow peripherally in the morning to decide resumption HCTZ and ASA as well as perform discharge med rec.     No charge visit.  JoAnna K. Barthell, PA-C

## 2022-11-21 NOTE — PROGRESS NOTES
PTA medications updated by Medication Scribe prior to surgery via phone call with patient (last doses completed by Nurse)     Medication history sources: Patient, Surescripts, H&P and Patient's home med list  In the past week, patient estimated taking medication this percent of the time: Greater than 90%  Adherence assessment: N/A Not Observed    Significant changes made to the medication list:  Patient reports no longer taking the following meds (med scribe removed from PTA med list): Lisinopril, Cannabidiol, Allegra, Vitamin C      Additional medication history information:   None    Medication reconciliation completed by provider prior to medication history? No    Time spent in this activity: 45 minutes    The information provided in this note is only as accurate as the sources available at the time of update(s)    Prior to Admission medications    Medication Sig Last Dose Taking? Auth Provider Long Term End Date   acetaminophen (TYLENOL) 325 MG tablet Take 2 tablets (650 mg) by mouth every 4 hours as needed for other (mild pain)  at prn Yes Adán Zafar MD     amoxicillin (AMOXIL) 500 MG capsule Take 2,000 mg by mouth once as needed (1 hour prior to dental appointments 4 x 500 mg = 2,000 mg)  at prn Yes Reported, Patient     aspirin (ASA) 325 MG EC tablet Take 1 tablet (325 mg) by mouth daily 11/15/2022 at pm Yes Adán Zafar MD No    atorvastatin (LIPITOR) 10 MG tablet Take 10 mg by mouth At Bedtime 11/20/2022 at pm Yes Reported, Patient No    Carboxymethylcellul-Glycerin (REFRESH OPTIVE OP) Apply 1 drop to eye 2 times daily 11/20/2022 at pm Yes Reported, Patient     Coenzyme Q10 (COQ10) 200 MG CAPS Take 1 capsule by mouth At Bedtime 11/20/2022 at pm Yes Unknown, Entered By History     empagliflozin (JARDIANCE) 10 MG TABS tablet Take 10 mg by mouth daily 11/20/2022 at am Yes Reported, Patient     fish oil-omega-3 fatty acids 1000 MG capsule Take 1 g by mouth daily 11/18/2022 at am Yes Unknown,  Entered By History No    glimepiride (AMARYL) 4 MG tablet Take 6 mg by mouth every morning (before breakfast) (1.5 x 4 mg = 6 mg) 11/20/2022 at am Yes Reported, Patient Yes    Glucosamine-Chondroit-Vit C-Mn (GLUCOSAMINE CHONDROITIN 1500 COMPLEX) CAPS Take 1 tablet by mouth 2 times daily  11/20/2022 at pm Yes Reported, Patient     hydrochlorothiazide (HYDRODIURIL) 25 MG tablet Take 25 mg by mouth daily  11/21/2022 at am Yes Reported, Patient Yes    insulin glargine (BASAGLAR KWIKPEN) 100 UNIT/ML pen Inject 20 Units Subcutaneous At Bedtime  11/20/2022 at pm Yes Reported, Patient Yes    levothyroxine (SYNTHROID/LEVOTHROID) 112 MCG tablet Take 112 mcg by mouth every morning (before breakfast) 11/21/2022 at am Yes Reported, Patient Yes    magnesium hydroxide (MILK OF MAGNESIA) 400 MG/5ML suspension Take 15 mLs by mouth 2 times daily as needed for constipation or heartburn  at prn Yes Adán Zafar MD     metFORMIN (GLUCOPHAGE) 500 MG tablet Take 1,500 mg by mouth every evening (3 x 500 mg = 1,500 mg) 11/20/2022 at pm Yes Reported, Patient No    metoprolol succinate ER (TOPROL-XL) 50 MG 24 hr tablet Take 1 tablet (50 mg) by mouth daily 11/21/2022 at am Yes Sheldon Wong PA-C Yes    minocycline (DYNACIN) 100 MG tablet Take 100 mg by mouth daily  11/21/2022 at am Yes Reported, Patient     multivitamin w/minerals (THERA-VIT-M) tablet Take 1 tablet by mouth daily 11/20/2022 at am Yes Reported, Patient No    omeprazole (PRILOSEC) 20 MG DR capsule Take 20 mg by mouth daily 11/21/2022 at am Yes Reported, Patient     pseudoePHEDrine (SUDAFED) 120 MG 12 hr tablet Take 120 mg by mouth every evening 11/20/2022 at pm Yes Reported, Patient     tadalafil (CIALIS) 20 MG tablet Take 1 tablet (20 mg) by mouth daily as needed (for intercourse)  at prn Yes Ike Lora MD Yes    tamsulosin (FLOMAX) 0.4 MG capsule Take 1 capsule (0.4 mg) by mouth daily NEED APPT FOR FUTURE REFILLS OR SEEK REFILLS FROM PRIMARY DOC  11/20/2022 at pm Yes Ike Lora MD     vitamin B complex with vitamin C (STRESS TAB) tablet Take 1 tablet by mouth daily 11/20/2022 at am Yes Reported, Patient     TESTOSTERONE AQUEOUS IM Inject 250 mg into the muscle every 21 days  MORE THAN TWO MONTHS at UNKNOWN  Reported, Patient No      Medication history completed by:    Tacho Arredondo CPhT  Medication [facility]

## 2022-11-21 NOTE — ANESTHESIA PROCEDURE NOTES
Airway       Patient location during procedure: OR       Procedure Start/Stop Times: 11/21/2022 11:43 AM  Staff -        Anesthesiologist:  Craig Gordon MD       CRNA: Crystal Ibrahim APRN CRNA       Other Anesthesia Staff: Larry Travis       Performed By: SRNA and with CRNAs       Procedure performed by resident/fellow/CRNA in presence of a teaching physician.    Consent for Airway        Urgency: elective  Indications and Patient Condition       Indications for airway management: jessie-procedural         Mask difficulty assessment: 1 - vent by mask    Final Airway Details       Final airway type: endotracheal airway       Successful airway: ETT - single and Oral  Endotracheal Airway Details        ETT size (mm): 8.0       Cuffed: yes       Successful intubation technique: direct laryngoscopy       DL Blade Type: MAC 3 (mac 4 DL with glidescope assistance)       Grade View of Cords: 2       Adjucts: stylet       Position: Right       Measured from: lips       Secured at (cm): 24       Bite block used: Soft    Post intubation assessment        Placement verified by: capnometry, equal breath sounds and chest rise        Number of attempts at approach: 1       Number of other approaches attempted: 0       Secured with: pink tape       Ease of procedure: easy       Dentition: Intact and Unchanged    Medication(s) Administered   Medication Administration Time: 11/21/2022 11:43 AM

## 2022-11-21 NOTE — OP NOTE
Orthopedic Surgery Operative Report    Patient:   Geoffrey Benjamin  MRN:   [id number]   :  1945  Facility: [facility]   Date:  22         PREOPERATIVE DIAGNOSIS:    Lumbar stenosis L3-4    POSTOPERATIVE DIAGNOSIS:    Lumbar stenosis L3-4    PROCEDURE PERFORMED:    Laminectomy L3-4    SURGEON:    Callum Villalobos MD    SURGICAL ASSISTANT:    Cheryle Ojeda PA-C     ANESTHESIA:  General    FINDINGS:    Severe central stenosis L3-4  Degenerative scoliosis    COMPLICATIONS:     None    SPECIMENS:    None    ESTIMATED BLOOD LOSS:  30 cc    IMPLANTS:    None    INDICATION FOR OPERATION:  The patient is a 77 year old male with claudication symptoms related to the above diagnosis.  Conservative measures were not effective in controlling his symptoms.  I discussed with him the risks, benefits, and alternatives of the above operation and he wished to proceed.    DESCRIPTION OF PROCEDURE:    The patient was met in the preoperative holding area and the operative site was confirmed and marked.  We once again reviewed the risks, benefits, and alternatives of the operation and the patient wished to proceed with the surgery.    The patient was taken back to the operating room and induced under general anesthesia.  The patient was positioned prone on the Pepito frame with all bony prominences well padded.  The surgical site was prepped and draped in the usual standard fashion.    I localized my incision site with a spinal needle and fluoroscopy.  A midline incision was made over the lumbar spine and dissection carried down to the spinous processes.  The midline structures including the interspinous ligaments were left intact, and the muscle was elevated off of the posterior vertebral elements.  After exposing a small segment, I placed an instrument on the spine took an x-ray to confirm level.  I marked the spinous processes for ongoing reference of localization, and then completed the  dissection from L3-4.      I noted that due to the rotational component of his scoliosis it would have been difficult to decompress the right side with hemilaminotomies without causing iatrogenic instability, therefore I chose to convert to a laminectomy L3-4.      I performed an L3-4 laminectomy using the monica, kerrison rongeurs.  I removed the spinous process overlying the level, but left the cranial edge of the L3 lamina intact.  I performed bilateral partial medial facetectomies while taking care to preserve enough pars and facet to minimize the risk of postoperative fracture.  I released the ligamentum flavum from the undersurface of the cranial and caudal lamina, and then the facets.  I removed the detached ligamentum flavum.  I inspected the neural elements, and resected additional bone from the lateral recess and foramen as necessary to ensure complete decompression of the exiting and traversing nerve roots.  I used a [place] to feel into the foramen and confirmed there was adequate decompression.    The operative site was thoroughly irrigated.  I achieved final hemostasis.  Vistaseal was placed over the neural elements.  Fascia was closed with a 0 Vicryl, subcutaneous tissues closed with 2-0 Vicryl, and skin closed with a running Monocryl.  Skin glue and Steri-Strips were applied.      The patient was transported to PACU in stable condition     A surgical assistant was critical for this case to assist in retraction of the soft tissues and evacuating blood from the surgical field to facilitate a safer operation with improved visualization and less time under anesthesia.      All sponge and needle counts were correct at case conclusion.        POSTOPERATIVE PLAN:  -Activity:    Up with assist  -Weight Bearing Status:  WBAT   -Bracing:   None  -Antibiotics:     Ancef x24h  -Anticoagulation:   SCDs only  -Pain control:    IV and PO, wean to PO as able  -Dressing:    Ok to shower with dressing in place, dressing  ok to get wet.  -Diet:     ADAT  -Labs:    AM Hgb, BMP  -Imaging:   None needed    -Disposition:    Pending medical stability, PT, anticipate discharge POD 1  -Follow up:     2 weeks in my clinic        Callum Villalobos MD

## 2022-11-21 NOTE — ANESTHESIA POSTPROCEDURE EVALUATION
Patient: Geoffrey Benjamin    Procedure: Procedure(s):  BILATERAL LUMBAR 3 TO LUMBAR 4 LAMINOTOMY       Anesthesia Type:  General    Note:     Postop Pain Control: Uneventful            Sign Out: Well controlled pain   PONV: No   Neuro/Psych: Uneventful            Sign Out: Acceptable/Baseline neuro status   Airway/Respiratory: Uneventful            Sign Out: Acceptable/Baseline resp. status   CV/Hemodynamics: Uneventful            Sign Out: Acceptable CV status; No obvious hypovolemia; No obvious fluid overload   Other NRE: NONE   DID A NON-ROUTINE EVENT OCCUR? No           Last vitals:  Vitals Value Taken Time   /75 11/21/22 1445   Temp 35.9  C (96.6  F) 11/21/22 1430   Pulse 75 11/21/22 1458   Resp 9 11/21/22 1458   SpO2 96 % 11/21/22 1458   Vitals shown include unvalidated device data.    Electronically Signed By: Craig Gordon MD  November 21, 2022  4:21 PM

## 2022-11-21 NOTE — INTERVAL H&P NOTE
"I have reviewed the surgical (or preoperative) H&P that is linked to this encounter, and examined the patient. There are no significant changes    Clinical Conditions Present on Arrival:  Clinically Significant Risk Factors Present on Admission                    # Obesity: Estimated body mass index is 30.29 kg/m  as calculated from the following:    Height as of this encounter: 1.651 m (5' 5\").    Weight as of this encounter: 82.6 kg (182 lb).       "

## 2022-11-21 NOTE — DISCHARGE INSTRUCTIONS
Same Day Surgery Discharge Instructions for  Sedation and General Anesthesia     It's not unusual to feel dizzy, light-headed or faint for up to 24 hours after surgery or while taking pain medication.  If you have these symptoms: sit for a few minutes before standing and have someone assist you when you get up to walk or use the bathroom.    You should rest and relax for the next 24 hours. We recommend you make arrangements to have an adult stay with you for at least 24 hours after your discharge.  Avoid hazardous and strenuous activity.    DO NOT DRIVE any vehicle or operate mechanical equipment for 24 hours following the end of your surgery.  Even though you may feel normal, your reactions may be affected by the medication you have received.    Do not drink alcoholic beverages for 24 hours following surgery.     Slowly progress to your regular diet as you feel able. It's not unusual to feel nauseated and/or vomit after receiving anesthesia.  If you develop these symptoms, drink clear liquids (apple juice, ginger ale, broth, 7-up, etc. ) until you feel better.  If your nausea and vomiting persists for 24 hours, please notify your surgeon.      All narcotic pain medications, along with inactivity and anesthesia, can cause constipation. Drinking plenty of liquids and increasing fiber intake will help.    For any questions of a medical nature, call your surgeon.    Do not make important decisions for 24 hours.    If you had general anesthesia, you may have a sore throat for a couple of days related to the breathing tube used during surgery.  You may use Cepacol lozenges to help with this discomfort.  If it worsens or if you develop a fever, contact your surgeon.     If you feel your pain is not well managed with the pain medications prescribed by your surgeon, please contact your surgeon's office to let them know so they can address your concerns.      **If you have questions or concerns about your procedure, call    Dr. Villalobos at [phone].**

## 2022-11-21 NOTE — ANESTHESIA PREPROCEDURE EVALUATION
Anesthesia Pre-Procedure Evaluation    Patient: Geoffrey Benjamin   MRN: [id number] : 1945        Procedure : Procedure(s):  BILATERAL LUMBAR 3 TO LUMBAR 4 LAMINOTOMY          Past Medical History:   Diagnosis Date     Acne rosacea      Benign neoplasm of colon      Bilateral lower extremity edema      Conductive hearing loss      Coronary artery disease      Depression     off medication since     Diverticulosis      Former smoker      Gastro-oesophageal reflux disease      Gout      Graves disease      Hyperlipidemia      Hypertension      Hypothyroidism      Impotence of organic origin      Kidney stone      Metabolic syndrome      Midline low back pain without sciatica      Osteoarthritis     hands and back     Other and unspecified hyperlipidemia      PONV (postoperative nausea and vomiting)      Postsurgical aortocoronary bypass status      S/P CABG x 4 2006    LIMA to LAD and a diagonal branch artery sequentially as well as a SVG to the posterior branch of the right coronary artery and a free radial artery to the obtuse marginal branch arter     Type II or unspecified type diabetes mellitus without mention of complication, not stated as uncontrolled      Unspecified hypothyroidism       Past Surgical History:   Procedure Laterality Date     ARTHROPLASTY KNEE Right 10/20/2020    Procedure: RIGHT TOTAL KNEE ARTHROPLASTY;  Surgeon: Adán Zafar MD;  Location: [facility]     ARTHROSCOPY KNEE      BILATERAL     BIOPSY      prostate     CORONARY ANGIOGRAPHY ADULT ORDER  2006     CORONARY ARTERY BYPASS  2006    LIMA to LAD and diagonal branch arteries sequentially, SVG to PDA of RCA, and free radial artery to OM     CYSTOSCOPY       ENT SURGERY      septoplasty x2     EXTRACORPOREAL SHOCK WAVE LITHOTRIPSY, CYSTOSCOPY, INSERT STENT URETER(S), COMBINED  2013    Procedure: COMBINED EXTRACORPOREAL SHOCK WAVE LITHOTRIPSY, CYSTOSCOPY, INSERT STENT URETER(S);  CYSTOSCOPY, LEFT URETERAL STENT  PLACEMENT, LEFT EXTRACORPOREAL SHOCK WAVE LITHOTRIPSY (ESWL)        EYE SURGERY Right     cataract     FOOT SURGERY      FUSION & TENDON REPAIR     HERNIA REPAIR       LASER HOLMIUM LITHOTRIPSY URETER(S), INSERT STENT, COMBINED Left 2018    Procedure: COMBINED CYSTOSCOPY, URETEROSCOPY, LASER HOLMIUM LITHOTRIPSY URETER(S), INSERT STENT;  1.  Video cystoscopy.    2.  Balloon dilation left ureter.    3.  Left ureteroscopy with holmium laser lithotripsy and stone extractions.    4.  Left double-J stent insertion (4.7 Romansh x 24 cm).   5.  Exam under anesthesia.;  Surgeon: Alfonso Sinha MD;  Location: [facility] OR     LASER HOLMIUM LITHOTRIPSY URETER(S), INSERT STENT, COMBINED Left 2018    Procedure: COMBINED CYSTOSCOPY, URETEROSCOPY, LASER HOLMIUM LITHOTRIPSY URETER(S), INSERT STENT;  1. Cystourethroscopy  2. Left flexible ureteroscopy with holmium laser lithotripsy and stone extraction  3. Left retrograde pyelography with interpretation of intraoperative fluoroscopic imaging  4. Left ureteral stent placement;  Surgeon: Romeo Cheng MD;  Location: [facility] OR     ORTHOPEDIC SURGERY Left     subtailor fusion, tendon repair     SURGICAL HISTORY OF -       Septoplasty at 13     SURGICAL HISTORY OF -   2005    Ureteral stent and lithotripsy for kidney stones     SURGICAL HISTORY OF -   2013    cataract extraction OD        Allergies   Allergen Reactions     Ezetimibe      Muscle aches     Hmg-Coa-R Inhibitors Nausea and Vomiting     Rosuvastatin Nausea and Vomiting     Simvastatin      Muscle aches      Social History     Tobacco Use     Smoking status: Former     Types: Pipe     Start date:      Quit date:      Years since quittin.9     Smokeless tobacco: Never   Substance Use Topics     Alcohol use: Yes     Comment: Occasion glass of wine- 1-2 x week      Wt Readings from Last 1 Encounters:   22 82.6 kg (182 lb)        Anesthesia Evaluation   Pt has had prior anesthetic.      History of anesthetic complications  - PONV.      ROS/MED HX  ENT/Pulmonary:    (-) tobacco use, asthma and sleep apnea   Neurologic:    (-) no seizures and no CVA   Cardiovascular:     (+) Dyslipidemia hypertension--CAD -CABG--fainting (syncope).  (-) angina and angina   METS/Exercise Tolerance:     Hematologic:       Musculoskeletal:   (+) arthritis,     GI/Hepatic:     (+) GERD, Asymptomatic on medication,  (-) liver disease   Renal/Genitourinary:     (+) renal disease, type: CRI,     Endo:     (+) type I DM, Using insulin, thyroid problem,  (-) Type II DM   Psychiatric/Substance Use:       Infectious Disease:       Malignancy:       Other:            Physical Exam    Airway        Mallampati: II   TM distance: > 3 FB   Neck ROM: full   Mouth opening: > 3 cm    Respiratory Devices and Support         Dental  no notable dental history         Cardiovascular   cardiovascular exam normal          Pulmonary   pulmonary exam normal                OUTSIDE LABS:  CBC:   Lab Results   Component Value Date    WBC 7.9 01/06/2021    WBC 8.9 11/08/2006    HGB 13.8 01/06/2021    HGB 14.1 10/21/2020    HCT 42.7 01/06/2021    HCT 32.7 (L) 11/08/2006     01/06/2021     (L) 11/08/2006     BMP:   Lab Results   Component Value Date     01/06/2021     10/21/2020    POTASSIUM 3.5 11/21/2022    POTASSIUM 3.4 01/06/2021    CHLORIDE 109 01/06/2021    CHLORIDE 106 10/21/2020    CO2 28 01/06/2021    CO2 28 10/21/2020    BUN 12 01/06/2021    BUN 14 10/21/2020    CR 0.65 (L) 01/06/2021    CR 0.81 01/05/2021     (H) 11/21/2022     (H) 01/06/2021     COAGS:   Lab Results   Component Value Date    PTT 30 11/07/2006    INR 1.42 (H) 11/07/2006     POC:   Lab Results   Component Value Date     (H) 01/06/2021     HEPATIC:   Lab Results   Component Value Date    ALBUMIN 4.2 10/19/2015    PROTTOTAL 6.6 10/19/2015    ALT 24 01/05/2021    AST 20 01/05/2021    ALKPHOS 74 10/19/2015    BILITOTAL 0.3  10/19/2015     OTHER:   Lab Results   Component Value Date    PH 7.38 11/07/2006    A1C 7.9 (H) 10/21/2020    RULA 8.4 (L) 01/06/2021    MAG 2.0 11/08/2006       Anesthesia Plan    ASA Status:  3   NPO Status:  NPO Appropriate    Anesthesia Type: General.     - Airway: ETT   Induction: Intravenous.   Maintenance: Balanced.        Consents    Anesthesia Plan(s) and associated risks, benefits, and realistic alternatives discussed. Questions answered and patient/representative(s) expressed understanding.    - Discussed:     - Discussed with:  Patient, Spouse         Postoperative Care    Pain management: Multi-modal analgesia.   PONV prophylaxis: Ondansetron (or other 5HT-3), Dexamethasone or Solumedrol, Background Propofol Infusion     Comments:                Craig Gordon MD

## 2022-11-22 ENCOUNTER — APPOINTMENT (OUTPATIENT)
Dept: PHYSICAL THERAPY | Facility: CLINIC | Age: 77
End: 2022-11-22
Attending: ORTHOPAEDIC SURGERY
Payer: MEDICARE

## 2022-11-22 VITALS
RESPIRATION RATE: 16 BRPM | OXYGEN SATURATION: 92 % | TEMPERATURE: 98 F | HEART RATE: 70 BPM | DIASTOLIC BLOOD PRESSURE: 56 MMHG | HEIGHT: 65 IN | WEIGHT: 182 LBS | BODY MASS INDEX: 30.32 KG/M2 | SYSTOLIC BLOOD PRESSURE: 103 MMHG

## 2022-11-22 LAB
ANION GAP SERPL CALCULATED.3IONS-SCNC: 7 MMOL/L (ref 3–14)
BUN SERPL-MCNC: 20 MG/DL (ref 7–30)
CALCIUM SERPL-MCNC: 9.1 MG/DL (ref 8.5–10.1)
CHLORIDE BLD-SCNC: 100 MMOL/L (ref 94–109)
CO2 SERPL-SCNC: 29 MMOL/L (ref 20–32)
CREAT SERPL-MCNC: 0.78 MG/DL (ref 0.66–1.25)
GFR SERPL CREATININE-BSD FRML MDRD: >90 ML/MIN/1.73M2
GLUCOSE BLD-MCNC: 242 MG/DL (ref 70–99)
GLUCOSE BLDC GLUCOMTR-MCNC: 200 MG/DL (ref 70–99)
GLUCOSE BLDC GLUCOMTR-MCNC: 267 MG/DL (ref 70–99)
POTASSIUM BLD-SCNC: 3.9 MMOL/L (ref 3.4–5.3)
SODIUM SERPL-SCNC: 136 MMOL/L (ref 133–144)

## 2022-11-22 PROCEDURE — 80048 BASIC METABOLIC PNL TOTAL CA: CPT | Performed by: ORTHOPAEDIC SURGERY

## 2022-11-22 PROCEDURE — 250N000013 HC RX MED GY IP 250 OP 250 PS 637: Performed by: ORTHOPAEDIC SURGERY

## 2022-11-22 PROCEDURE — 36415 COLL VENOUS BLD VENIPUNCTURE: CPT | Performed by: ORTHOPAEDIC SURGERY

## 2022-11-22 PROCEDURE — 97530 THERAPEUTIC ACTIVITIES: CPT | Mod: GP

## 2022-11-22 PROCEDURE — 82962 GLUCOSE BLOOD TEST: CPT

## 2022-11-22 PROCEDURE — 99203 OFFICE O/P NEW LOW 30 MIN: CPT | Performed by: PHYSICIAN ASSISTANT

## 2022-11-22 PROCEDURE — 97161 PT EVAL LOW COMPLEX 20 MIN: CPT | Mod: GP

## 2022-11-22 RX ORDER — METHOCARBAMOL 500 MG/1
500 TABLET, FILM COATED ORAL 4 TIMES DAILY PRN
Qty: 30 TABLET | Refills: 0 | Status: SHIPPED | OUTPATIENT
Start: 2022-11-22

## 2022-11-22 RX ORDER — OXYCODONE HYDROCHLORIDE 5 MG/1
5 TABLET ORAL EVERY 4 HOURS PRN
Qty: 12 TABLET | Refills: 0 | Status: SHIPPED | OUTPATIENT
Start: 2022-11-22

## 2022-11-22 RX ORDER — AMOXICILLIN 250 MG
1 CAPSULE ORAL 2 TIMES DAILY
Qty: 28 TABLET | Refills: 0 | Status: SHIPPED | OUTPATIENT
Start: 2022-11-22 | End: 2022-12-06

## 2022-11-22 RX ORDER — OXYCODONE HYDROCHLORIDE 5 MG/1
5 TABLET ORAL EVERY 4 HOURS PRN
Qty: 12 TABLET | Refills: 0 | Status: SHIPPED | OUTPATIENT
Start: 2022-11-22 | End: 2022-11-22

## 2022-11-22 RX ADMIN — ACETAMINOPHEN 975 MG: 325 TABLET, FILM COATED ORAL at 00:10

## 2022-11-22 RX ADMIN — SENNOSIDES AND DOCUSATE SODIUM 1 TABLET: 50; 8.6 TABLET ORAL at 08:54

## 2022-11-22 RX ADMIN — POLYETHYLENE GLYCOL 3350 17 G: 17 POWDER, FOR SOLUTION ORAL at 08:52

## 2022-11-22 RX ADMIN — ACETAMINOPHEN 975 MG: 325 TABLET, FILM COATED ORAL at 08:53

## 2022-11-22 RX ADMIN — METHOCARBAMOL 500 MG: 500 TABLET ORAL at 06:13

## 2022-11-22 RX ADMIN — OXYCODONE HYDROCHLORIDE 5 MG: 5 TABLET ORAL at 06:13

## 2022-11-22 ASSESSMENT — ACTIVITIES OF DAILY LIVING (ADL)
ADLS_ACUITY_SCORE: 25
ADLS_ACUITY_SCORE: 27

## 2022-11-22 NOTE — DISCHARGE SUMMARY
ORTHOPAEDIC DISCHARGE SUMMARY     Date of Admission: 11/21/2022  Date of Discharge: 11/22/2022  Disposition: Home  Surgeon: Callum Villalobos MD      DISCHARGE DIAGNOSIS:  Spinal stenosis, lumbar region, without neurogenic claudication [M48.061]    PROCEDURES: Procedure(s):  BILATERAL LUMBAR 3 TO LUMBAR 4 LAMINOTOMY on 11/21/2022    BRIEF HISTORY:  This was a planned admission after the above elective procedure.    HOSPITAL COURSE:    Surgery was uncomplicated. Geoffrey Benjamin has done well post-operatively.     The patient received routine nursing cares and is medically stable. Vital signs are stable. The patient is tolerating a regular diet without GI distress/nausea or vomiting. Voiding spontaneously. All PT/OT goals have been met for safe mobility. Pain is now controlled on oral medications which will be available on discharge. Stool softeners have been used while taking pain medications to help prevent constipation. Geoffrey Benjamin is deemed medically safe to discharge.     Antibiotics:  Ancef given periop and 24 hours postop.  PT Progress:  Has met PT/OT goals for safe mobility.   Pain Meds:  Weaned off all IV pain meds by discharge.  Inpatient Events: No significant events or complications.     Discharge orders and instructions as below.    FOLLOWUP:    Future Appointments   Date Time Provider Department Center   11/22/2022 10:30 AM [facility]         PLANNED DISCHARGE ORDERS:     DVT Prophylaxis: Mobilization        Current Discharge Medication List      START taking these medications    Details   methocarbamol (ROBAXIN) 500 MG tablet Take 1 tablet (500 mg) by mouth 4 times daily as needed for muscle spasms  Qty: 30 tablet, Refills: 0    Associated Diagnoses: Spinal stenosis of lumbar region with neurogenic claudication      oxyCODONE (ROXICODONE) 5 MG tablet Take 1 tablet (5 mg) by mouth every 4 hours as needed for moderate pain (4-6) or severe pain (7-10)  Qty: 12 tablet, Refills: 0     Associated Diagnoses: Spinal stenosis of lumbar region with neurogenic claudication      senna-docusate (SENOKOT-S/PERICOLACE) 8.6-50 MG tablet Take 1 tablet by mouth 2 times daily for 14 days  Qty: 28 tablet, Refills: 0    Associated Diagnoses: Spinal stenosis of lumbar region with neurogenic claudication         CONTINUE these medications which have CHANGED    Details   aspirin (ASA) 325 MG EC tablet Take 1 tablet (325 mg) by mouth daily  Qty: 30 tablet, Refills: 0    Associated Diagnoses: S/P total knee arthroplasty, right         CONTINUE these medications which have NOT CHANGED    Details   acetaminophen (TYLENOL) 325 MG tablet Take 2 tablets (650 mg) by mouth every 4 hours as needed for other (mild pain)  Qty: 100 tablet, Refills: 0    Associated Diagnoses: S/P total knee arthroplasty, right      amoxicillin (AMOXIL) 500 MG capsule Take 2,000 mg by mouth once as needed (1 hour prior to dental appointments 4 x 500 mg = 2,000 mg)      atorvastatin (LIPITOR) 10 MG tablet Take 10 mg by mouth At Bedtime      Carboxymethylcellul-Glycerin (REFRESH OPTIVE OP) Apply 1 drop to eye 2 times daily      Coenzyme Q10 (COQ10) 200 MG CAPS Take 1 capsule by mouth At Bedtime      empagliflozin (JARDIANCE) 10 MG TABS tablet Take 10 mg by mouth daily      fish oil-omega-3 fatty acids 1000 MG capsule Take 1 g by mouth daily      glimepiride (AMARYL) 4 MG tablet Take 6 mg by mouth every morning (before breakfast) (1.5 x 4 mg = 6 mg)      Glucosamine-Chondroit-Vit C-Mn (GLUCOSAMINE CHONDROITIN 1500 COMPLEX) CAPS Take 1 tablet by mouth 2 times daily       hydrochlorothiazide (HYDRODIURIL) 25 MG tablet Take 25 mg by mouth daily       insulin glargine (BASAGLAR KWIKPEN) 100 UNIT/ML pen Inject 20 Units Subcutaneous At Bedtime     Comments: If Basaglar is not covered by insurance, may substitute Lantus at same dose and frequency.        levothyroxine (SYNTHROID/LEVOTHROID) 112 MCG tablet Take 112 mcg by mouth every morning (before  breakfast)      magnesium hydroxide (MILK OF MAGNESIA) 400 MG/5ML suspension Take 15 mLs by mouth 2 times daily as needed for constipation or heartburn  Qty: 355 mL, Refills: 0    Associated Diagnoses: S/P total knee arthroplasty, right      metFORMIN (GLUCOPHAGE) 500 MG tablet Take 1,500 mg by mouth every evening (3 x 500 mg = 1,500 mg)      metoprolol succinate ER (TOPROL-XL) 50 MG 24 hr tablet Take 1 tablet (50 mg) by mouth daily  Qty: 30 tablet, Refills: 0    Comments: Future refills by PCP Dr. Kike Ng or Dr. Springer of [facility].  Associated Diagnoses: Coronary artery disease involving native coronary artery of native heart without angina pectoris      minocycline (DYNACIN) 100 MG tablet Take 100 mg by mouth daily       multivitamin w/minerals (THERA-VIT-M) tablet Take 1 tablet by mouth daily      omeprazole (PRILOSEC) 20 MG DR capsule Take 20 mg by mouth daily      pseudoePHEDrine (SUDAFED) 120 MG 12 hr tablet Take 120 mg by mouth every evening      tadalafil (CIALIS) 20 MG tablet Take 1 tablet (20 mg) by mouth daily as needed (for intercourse)  Qty: 30 tablet, Refills: 11    Associated Diagnoses: Erectile dysfunction, unspecified erectile dysfunction type      tamsulosin (FLOMAX) 0.4 MG capsule Take 1 capsule (0.4 mg) by mouth daily NEED APPT FOR FUTURE REFILLS OR SEEK REFILLS FROM PRIMARY DOC  Qty: 90 capsule, Refills: 3      TESTOSTERONE AQUEOUS IM Inject 250 mg into the muscle every 21 days       vitamin B complex with vitamin C (STRESS TAB) tablet Take 1 tablet by mouth daily               Discharge Procedure Orders   Discharge Instructions   Order Comments: Care after a Lumbar fusion - Dr. Callum Villalobos    The following information will help you through your recovery at home.    Pain  - It is normal for you to experience some pain in the area of your incision after your surgery.  If you had leg pain preoperatively, some of your leg pain may go away immediately after your surgery.   Some of the pain will gradually decrease over the next few days or weeks depending on the amount of inflammation present in the nerve.      -  If some of your leg pain returns 3 to 5 days after surgery it may be due to swelling around the nerve.  If this is severe, contact Dr Villalobos's team.    - You should call Dr. Villalobos's office if your leg pain returns suddenly and does not improve over 24 hours.      Home Medications  - If you take a blood thinner (e.g. aspirin, warfarin, Xeralto, Eliquis, etc...) at baseline, wait until two days after the operation to start taking it again.  This is to lower the risk of a blood collection pushing on the nerves in the surgery site.  If after starting your blood thinner again, if you notice severe worsening back and leg pain, contact Dr Villalobos's office immediately, as this could represent a blood collection forming.    Activity  - You may increase your activity as tolerated; walking is the best form of exercise after spine surgery.    - For six weeks after surgery avoid bending, lifting, and twisting (BLT).  Avoid activities such as vacuuming, raking and shoveling.   - Try to limit your lifting to 10 lbs during the first 2 weeks and then increase to 20-25lbs over the next 6 weeks.  - You may return to work approximately 1- 2 weeks after your surgery if you have a sedentary or desk type job.  If you have a physical job Dr. Villalobos and his staff will help you determine a return to work plan.    - You may resume sexual activity at the six week esha.  Stop if you have pain.    Driving  - You may drive if you feel strong enough and are not taking any narcotic pain medications.    Incision Site   - It is ok to shower starting the day after surgery.  You may allow the dressing to get wet.  Do not immerse the incision in water such as (bath, pool, hot tub) for at least 3 weeks.  Do not scrub the incision site while in the shower.    - Your incision is covered with steri-strips (narrow white  tapes); they will get loose and fall off in 10-20 days.  If they are still in place 3 weeks after surgery, you may remove them.        Pain Management   - Take your prescribed pain medication as needed and directed.  Use Tylenol and/or muscle relaxers for your discomfort when you no longer need the narcotic pain medication.    - If you need a refill on your pain medication call [phone], please allow 24 hours for your prescription to be refilled, Dr. Villalobos's office does not refill pain medications on Friday afternoons.    Diet   - Eat a healthy diet; this will help your recovery.  - Drink plenty of fluids, water, milk or juice.  - If you have trouble with constipation you should eat more fiber, drink more fluids, increase your walking or try an over the counter laxative.    Follow-up Visits  - You should have a post-op appointment that was scheduled for you at the same time your surgery was scheduled. If you do not, please call Dr. Villalobos's office when you get home from your surgery.  - Write down any questions you have about your surgery, recovery, return to work and other topics you wished to be covered at your post-op visit.  This way, we will be able to address all of your questions at your next visit.  - Call Dr. Villalobos's office if you have any questions or concerns.    When to Call your Doctor:  - If you have any redness, warmth or swelling at the incision site.  - If your incision opens up.  - If you have increasing drainage from your incision.  - If you have a temperature greater than 100.5 degrees Fahrenheit.     Reason for your hospital stay   Order Comments: L3-4 lumbar decompression     Follow-up and recommended labs and tests   Order Comments: Follow up with Dr Villalobos 2 weeks after surgery     Activity   Order Comments: Your activity upon discharge: Weight bear as tolerated     Order Specific Question Answer Comments   Is discharge order? Yes      Discharge Instructions   Order Comments: Care after Spine  Surgery - Dr. Callum Villalobos    The following information will help you through your recovery at home.    Pain  - It is normal for you to experience some pain in the area of your incision after your surgery.  Some of your leg pain may go away immediately after your surgery.  Some of the pain will gradually decrease over the next few days or weeks depending on the amount of inflammation present in the nerve.      - Sometimes Dr. Villalobos will place a steroid preparation on the nerve during surgery.   This may help decrease the nerve inflammation for the first few days after surgery.  If some of your leg pain returns 3 to 5 days after surgery it may be due to the steroid wearing off.  The pain should not be as bad as your pre-op pain and will diminish over a period of weeks.      - You should call Dr. Villalobos's office if your leg pain returns suddenly and does not improve over 24 hours.      Home Medications  - If you take a blood thinner (e.g. aspirin, warfarin, Xeralto, Eliquis, etc...) at baseline, wait until two days after the operation to start taking it again.  This is to lower the risk of a blood collection pushing on the nerves in the surgery site.  If after starting your blood thinner again, if you notice severe worsening back and leg pain, contact Dr Villalobos's office immediately, as this could represent a blood collection forming.    Activity  - You may increase your activity as tolerated; walking is the best form of exercise after spine surgery.    - For six weeks after surgery avoid bending, lifting, and twisting (BLT).  Avoid activities such as vacuuming, raking and shoveling.   - Try to limit your lifting to 10 lbs during the first 2 weeks and then increase to 20-25lbs over the next 6 weeks.  - You may return to work approximately 1- 2 weeks after your surgery if you have a sedentary or desk type job.  If you have a physical job Dr. Villalobos and his staff will help you determine a return to work plan.    - You may resume  sexual activity when you feel ready.  Stop if you have pain.    Driving  - You may drive if you feel strong enough and are not taking any narcotic pain medications.    Incision Site   - It is ok to shower starting the day after surgery.  You may allow the dressing to get wet.  Do not immerse the incision in water such as (bath, pool, hot tub) for at least 3 weeks.  Do not scrub the incision site while in the shower.    - Your incision is covered with steri-strips (narrow white tapes); they will get loose and fall off in 10-20 days.  If they are still in place 3 weeks after surgery, you may remove them.        Pain Management   - Take your prescribed pain medication as needed and directed.  Use Tylenol for your discomfort when you no longer need the narcotic pain medication.   If you have had a fusion, do not use ibuprofen or other NSAIDs unless you talk to Dr Villalobos about it first.    - If you need a refill on your pain medication call [phone], please allow 24 hours for your prescription to be refilled, Dr. Villalobos's office does not refill pain medications on Friday afternoons.    Diet   - Eat a healthy diet; this will help your recovery.  - Drink plenty of fluids, water, milk or juice.  - If you have trouble with constipation you should eat more fiber, drink more fluids, increase your walking or try an over the counter laxative.    Follow-up Visits  - You should have a post-op appointment that was scheduled for you at the same time your surgery was scheduled. If you do not, please call Dr. Villalobos's office when you get home from your surgery.  - Write down any questions you have about your surgery, recovery, return to work and other topics you wished to be covered at your post-op visit.  This way, we will be able to address all of your questions at your next visit.  - Call Dr. Villalobos's office if you have any questions or concerns.    When to Call your Doctor:  - If you have any redness, warmth or swelling at the  incision site.  - If your incision opens up.  - If you have increasing drainage from your incision.  - If you have a temperature greater than 100.5 degrees Fahrenheit.     Diet   Order Comments: Follow this diet upon discharge: Normal diet     Order Specific Question Answer Comments   Is discharge order? Yes            Callum Villalobos MD  Orthopaedic Spine Surgery  [facility]

## 2022-11-22 NOTE — PROGRESS NOTES
The patient will be discharging to home, via personal transportation, accompanied by family. He attests to having all of his belongings and will sign all pertinent paperwork.

## 2022-11-22 NOTE — PROGRESS NOTES
[facility]    Medicine Progress Note - Hospitalist Service    Date of Admission:  11/21/2022    Assessment & Plan   Geoffrey Benjamin is a 77 year old male with PMHx GERD, acne rosacea, hypothyroidism, BPH, DM2, HLD, HTN, and CAD s/p prior CABG who was admitted 11/21/22 under the care of the orthopedics service for post op mngt following an elective bilateral L3-L4 laminotomy. EBL 30ml.     S/p bilateral L3-L4 laminotomy (11/21/2022)  EBL 30.  Preop hemoglobin 15.3.  - Postoperative management per orthopedics.  - Resume ASA when OK with ortho spine.     CAD s/p 4v-CABG (2006).  HTN / DLD.  Follows with [facility] cardiology through [facility].  [PTA: ASA 325mg, atorvastatin 10mg QHS, metoprolol succinate 50mg, HCTZ 25mg]  - Continue PTA regimen as above. hydrochlorothiazide on hold during stay while receiving IVF and for softer BPs post-op. Can be resumed upon dismissal 11/23  - Resume ASA when OK with ortho spine.      Steroid induced hyperglycemia   T2DM, insulin dependent  A1C 8.3. Follows with endo. Received decadron 4 mg X1 pe Anesthesia intraop  [PTA: empagliflozin 10mg, glimepiride 6mg, metformin 1500mg evenings, Lantus 20u QHS]  - Hold PO agents, resume at discharge   - Resume Lantus 20 U at bedtime at discharge, received 15 U the evening prior POD0.  - Monitor BG QID AC HS and medium SSI available during hospital stay   - Mod carb diet.    Recent Labs   Lab 11/22/22  0631 11/22/22  0232 11/21/22  2203 11/21/22  1348 11/21/22  0841   * 200* 219* 113* 148*      Acne rosacea. Resume Minocycline at outpatient discharge.     Hypothyroidism.  Resume Levothyroxine at outpatient discharge.     GERD.  Resume PPI at outpatient discharge.     BPH.  Cont PTA Tamsulosin.     Diet: Advance Diet as Tolerated: Regular Diet Adult  Diet    DVT Prophylaxis: Defer to primary service  Schmitz Catheter: Not present  Central Lines: None  Cardiac Monitoring: None  Code Status: Full Code      Disposition Plan Per  "Primary team. OK from Hospitalist standpoint for dismissal.      Expected Discharge Date: 11/22/2022                The patient's care was discussed with the Attending Physician, Dr. Triplett, Bedside Nurse and Patient.    Heidi Suero PA-C  Hospitalist Service  [facility]  Securely message with the Vocera Web Console (learn more here)  Text page via [employer] Paging/Directory     Clinically Significant Risk Factors Present on Admission                      # Obesity: Estimated body mass index is 30.29 kg/m  as calculated from the following:    Height as of this encounter: 1.651 m (5' 5\").    Weight as of this encounter: 82.6 kg (182 lb).         __________________________________________________________    Interval History   Pain well controlled. No acute concerns. Vitals stable. Hyperglycemia in the setting of receiving decadron X1 intraop. Discussed with patient. Plan to resume PTA meds upon dismissal.     Data reviewed today: I reviewed all medications, new labs and imaging results over the last 24 hours. I personally reviewed all labs and imaging to date.     Physical Exam   Vital Signs: Temp: 98  F (36.7  C) Temp src: Oral BP: 103/56 Pulse: 70   Resp: 16 SpO2: 92 % O2 Device: None (Room air) Oxygen Delivery: 1 LPM  Weight: 182 lbs 0 oz    CONSTITUTIONAL: Pt laying in bed, dressed in hospital garb. Appears comfortable. Cooperative with interview.  HEENT: Normocephalic, atraumatic.   CARDIOVASCULAR: RRR, no murmurs, rubs, or extra heart sounds appreciated. Pulses +2/4 and regular in upper and lower extremities, bilaterally.   RESPIRATORY: No increased work of breathing. CTA, bilat; no wheezes, rales, or rhonchi appreciated.  GASTROINTESTINAL:  Abdomen soft, non-distended. BS auscultated in all four quadrants. Negative for tenderness to palpation.  No masses or organomegaly noted.  MUSCULOSKELETAL: No gross deformities noted. Normal muscle tone. "   HEMATOLOGIC/LYMPHATIC/IMMUNOLOGIC: Negative for lower extremity edema, bilaterally.  NEUROLOGIC: Alert and oriented to person, place, and time.  strength intact. No focal neuro deficits.   SKIN: Warm, dry, intact.    Data   Recent Labs   Lab 11/22/22  0631 11/22/22  0232 11/21/22  2203 11/21/22  1348 11/21/22  0841   POTASSIUM  --   --   --   --  3.5   * 200* 219*   < > 148*    < > = values in this interval not displayed.     No results found for this or any previous visit (from the past 24 hour(s)).  Medications     lactated ringers Stopped (11/22/22 0405)       acetaminophen  975 mg Oral Q8H     atorvastatin  10 mg Oral At Bedtime     carboxymethylcellulose-glycerin  1 drop Both Eyes BID     insulin glargine  15 Units Subcutaneous At Bedtime     metoprolol succinate ER  50 mg Oral Daily     polyethylene glycol  17 g Oral Daily     senna-docusate  1 tablet Oral BID     sodium chloride (PF)  3 mL Intracatheter Q8H     tamsulosin  0.4 mg Oral Daily

## 2022-11-22 NOTE — PROGRESS NOTES
11/22/22 1013   Appointment Info   Signing Clinician's Name / Credentials (PT) Vincent Jarvis, PT, DPT   Living Environment   People in Home spouse   Current Living Arrangements house  ([facility] - one level and basement)   Home Accessibility stairs to enter home;stairs within home   Number of Stairs, Main Entrance 3   Stair Railings, Main Entrance railings on both sides of stairs   Number of Stairs, Within Home, Primary greater than 10 stairs   Stair Railings, Within Home, Primary railing on right side (ascending)   Transportation Anticipated family or friend will provide   Living Environment Comments Patient lives with spouse in one level house with basment. [place] 12 steps to basement with unilateral handrail where patient watches TV. 3 steps to enter house with bilateral handrails. Walk-in shower, grab bars, shower chair/bench, armrests on toilet.   Self-Care   Usual Activity Tolerance good   Current Activity Tolerance moderate   Equipment Currently Used at Home none   Fall history within last six months no   Activity/Exercise/Self-Care Comment Patient independent with mobility and cares at baseline with no use of assistive device.   General Information   Onset of Illness/Injury or Date of Surgery 11/21/22   Referring Physician Callum Villalobos MD   Patient/Family Therapy Goals Statement (PT) Patient would like to return home this morning   Pertinent History of Current Problem (include personal factors and/or comorbidities that impact the POC) 77 year old male with PMHx GERD, acne rosacea, hypothyroidism, BPH, DM2, HLD, HTN, and CAD s/p prior CABG who was admitted 11/21/22 under the care of the orthopedics service for post op mngt following an elective bilateral L3-L4 laminotomy. POD #1   Existing Precautions/Restrictions spinal   Cognition   Affect/Mental Status (Cognition) WNL   Orientation Status (Cognition) oriented x 4   Follows Commands (Cognition) WNL   Pain Assessment   Patient Currently in Pain Yes,  see Vital Sign flowsheet   Range of Motion (ROM)   ROM Comment spinal ROM limited secondary to post-surgical status and spinal precautions   Strength (Manual Muscle Testing)   Strength Comments LE strength 5/5   Bed Mobility   Bed Mobility supine-sit;sit-supine   Supine-Sit [place] (Bed Mobility) supervision;contact guard   Sit-Supine [place] (Bed Mobility) supervision;contact guard   Comment, (Bed Mobility) supine<>sit transfers with SBA and bed flat. Use of bedrail   Transfers   Transfers sit-stand transfer   Sit-Stand Transfer   Sit-Stand [place] (Transfers) supervision;contact guard   Assistive Device (Sit-Stand Transfers) walker, front-wheeled   Comment, (Sit-Stand Transfer) sit<>stand tranfsers with SBA and FWW   Gait/Stairs (Locomotion)   [place] Level (Gait) supervision;contact guard   Assistive Device (Gait) walker, front-wheeled   Distance in Feet (Required for LE Total Joints) 10' eval   Distance in Feet (Gait) 240' treatment   Comment, (Gait/Stairs) Patient ambulating for 10' with FWW and SBA-CGA. No instability noted.   Balance   Balance Comments No deficits identified when BUE support on FWW   Sensory Examination   Sensory Perception WNL   Clinical Impression   Criteria for Skilled Therapeutic Intervention Yes, treatment indicated   PT Diagnosis (PT) impaired mobility   Influenced by the following impairments post-surgical ROM and strength limitations, reduced activity tolerance   Functional limitations due to impairments impaired functional mobility, impaired stair navigation, and gait   Clinical Presentation (PT Evaluation Complexity) Stable/Uncomplicated   Clinical Decision Making (Complexity) low complexity   Planned Therapy Interventions (PT) balance training;bed mobility training;gait training;neuromuscular re-education;patient/family education;stair training;strengthening;transfer training;progressive activity/exercise   Risk & Benefits of therapy have been explained  evaluation/treatment results reviewed;care plan/treatment goals reviewed;risks/benefits reviewed;current/potential barriers reviewed;participants voiced agreement with care plan;participants included;patient   PT Total Evaluation Time   PT Eval, Low Complexity Minutes (12614) 10   Plan of Care Review   Plan of Care Reviewed With patient   Physical Therapy Goals   PT Frequency Daily   PT Predicted Duration/Target Date for Goal Attainment 11/25/22   PT Goals Bed Mobility;Transfers;Gait;Stairs   PT: Bed Mobility Modified independent;Supine to/from sit  (logroll technique)   PT: Transfers Modified independent;Sit to/from stand;Assistive device   PT: Gait Modified independent;Greater than 200 feet;Rolling walker   PT: Stairs Greater than 10 stairs;Supervision/stand-by assist;Rail on right   Interventions   Interventions Quick Adds Gait Training;Therapeutic Activity   Therapeutic Activity   Therapeutic Activities: dynamic activities to improve functional performance Minutes (08607) 9   Symptoms Noted During/After Treatment Fatigue;Increased pain   Treatment Detail/Skilled Intervention Supine in bed at beginning of session - agreeable to participate in PT. Educated patient on spinal precautions and provided posture and body movement handout for further information following spinal surgery. Educated patietn on logroll technique. Cues for sequencing. Performing supine<>sit utilizing logroll technique with supervision-SBA. PAtient performing sit<>stands with SBA and FWW. PErforming sit<>stand from toilet with SBA and FWW. Patient demonstrating ability to  item from ground with squatting technique - no reported  pain. Patient seated in recliner at end of session with all needs within reach.   Gait Training   Gait Training Minutes (92680) 8   Symptoms Noted During/After Treatment (Gait Training) fatigue;increased pain   Treatment Detail/Skilled Intervention Patient completing bout of ambulation for 240' with FWW and  initial CGA progressing to supervision-SBA. Patient with slight UE reliance on FWW. Education on use of FWW upon return home for safety and stability. Decreased step length and gait velocity, but no instability or LOB throughout. Patient completed bout of stair navigation x12 steps with FWW and SBA-CGA. Step to pattern utilized throughout. Placing BUE's on unilateral handrail and descending at an angle.  Reciprocal pattern while ascneding. No instability or LOB when performing.   PT Discharge Planning   PT Plan tentative discharge this morning, progress gait distance and stair navigation, review precautions.   PT Discharge Recommendation (DC Rec) home with assist   PT Rationale for DC Rec Patient below baseline functional status but mobilizing well with supervision-SBA and front wheeled walker during session. Lives in one level house with supportive spouse who is available at all times. Needs to navigate many stairs but demonstrated ability to complete stair navigation at session with SBA. Recommend discharge home with supervision and assist as needed from spouse.   PT Brief overview of current status supervision-SBA for all mobility   Total Session Time   Timed Code Treatment Minutes 17   Total Session Time (sum of timed and untimed services) 27

## 2022-11-22 NOTE — PLAN OF CARE
Goal Outcome Evaluation:    Patient vital signs are at baseline: Yes  Patient able to ambulate as they were prior to admission or with assist devices provided by therapies during their stay:  Yes  Patient MUST void prior to discharge:  Yes  Patient able to tolerate oral intake:  Yes  Pain has adequate pain control using Oral analgesics:  Yes  Does patient have an identified :  Yes  Has goal D/C date and time been discussed with patient:  Yes     Pt is A&Ox4. POD1 L3-L4 Lami. Up with asist of 1 with gait belt and walker. Voiding adequately, 975 PVR. Incision CDI, pain controlled with Oxy, Atarax, Tylenol and Ice. CMS intact.

## 2022-11-22 NOTE — PROGRESS NOTES
11/21/22; [id number]    POD #0 L3-L4 lami; A&O X4; A1 gb/walker; reg diet tolerated; pain managed with PRN Oxycodone and scheduled Tylenol; incision CDI; no drains; voiding well; plan for discharge with PT eval tomorrow.

## 2022-11-22 NOTE — PROGRESS NOTES
"Ortho Progress Note     Subjective:  No acute overnight events. Recovering well. Incisional pain well-controlled. Endorsing improvement of pre-operative symptoms.     Objective:  /67 (BP Location: Right arm, Patient Position: Semi-Neri's, Cuff Size: Adult Regular)   Pulse 78   Temp 98  F (36.7  C) (Oral)   Resp 16   Ht 1.651 m (5' 5\")   Wt 82.6 kg (182 lb)   SpO2 96%   BMI 30.29 kg/m    Gen: A&Ox3, no acute distress  CV: Extremities warm to touch, appear well-perfused  Resp: breathing equal and non-labored, no wheezing  MSK:       Spine:   Skin: intact over lumbar spine without evidence of infection    Sensation:      R       L    L3:   Intact   Intact    L4:   Intact   Intact    L5:   Intact   Intact    S1:   Intact   Intact     Motor:     R L    L3: Psoas    5  5    L4:   Quad    5  5    L4: TA   5 5    L5: EHL    5  5    S1: Eversion/PF  5  5       Hemoglobin   Date Value Ref Range Status   01/06/2021 13.8 13.3 - 17.7 g/dL Final   10/21/2020 14.1 13.3 - 17.7 g/dL Final     Assessment/Plan:  POD 1 s/p L3-4 laminectomy. Recovering well as expected. Endorsing improvement of pre-operative symptoms. Mobilizing well.     -Activity:                                       Up with assist  -Weight Bearing Status:            WBAT   -Bracing:                                      None  -Antibiotics:                                  Ancef x24h  -Anticoagulation:                        SCDs only  -Pain control:                               IV and PO, wean to PO as able  -Dressing:                                     Ok to shower with dressing in place, dressing ok to get wet.  -Diet:                                              ADAT  -Labs:                                             AM Hgb, BMP  -Imaging:                                      None needed     -Disposition:                                 Pending medical stability, PT, home today   -Follow up:                                   2 weeks in Dr. Villalobos's " clinic    Cheryle Ojeda PA-C  Orthopedic Spine Surgery  [facility]

## 2022-11-23 NOTE — PLAN OF CARE
Physical Therapy Discharge Summary    Reason for therapy discharge:    Discharged to home with assist    Progress towards therapy goal(s). See goals on Care Plan in [facility] electronic health record for goal details.  Goals met    Therapy recommendation(s):    Continue home exercise program.

## 2022-11-30 ENCOUNTER — DOCUMENTATION ONLY (OUTPATIENT)
Dept: OTHER | Facility: CLINIC | Age: 77
End: 2022-11-30

## 2022-12-03 ENCOUNTER — HEALTH MAINTENANCE LETTER (OUTPATIENT)
Age: 77
End: 2022-12-03

## 2023-03-26 ENCOUNTER — HEALTH MAINTENANCE LETTER (OUTPATIENT)
Age: 78
End: 2023-03-26

## 2023-08-26 ENCOUNTER — HEALTH MAINTENANCE LETTER (OUTPATIENT)
Age: 78
End: 2023-08-26

## 2023-09-27 ENCOUNTER — TELEPHONE (OUTPATIENT)
Dept: UROLOGY | Facility: CLINIC | Age: 78
End: 2023-09-27
Payer: MEDICARE

## 2023-09-28 DIAGNOSIS — N20.0 CALCULUS OF KIDNEY: Primary | ICD-10-CM

## 2023-09-28 NOTE — TELEPHONE ENCOUNTER
78 yo M w/ prior history of nephrolithiasis, now he is having right flank pain and believes he is having renal colic, 6-7/10 pain. He is asking if increased tamsulosin would help passage and this is doubtful    If worsening pain, develop nausea or vomiting, or especially fevers or chills go to ER. Take tylenol, alternate with ibuprofen. Patient to contact clinic in the morning for followup    Laurent Montoya MD   [facility] Urology  [phone] clinic phone

## 2023-09-28 NOTE — TELEPHONE ENCOUNTER
[facility] Call Center    Phone Message    May a detailed message be left on voicemail: yes     Reason for Call: Other: pt calling and is in great pain, sure he has a kidney stone, pain level is at a 7, please advise     Action Taken: Other: urology    Travel Screening: Not Applicable

## 2023-10-06 ENCOUNTER — ANCILLARY PROCEDURE (OUTPATIENT)
Dept: CT IMAGING | Facility: CLINIC | Age: 78
End: 2023-10-06
Attending: UROLOGY
Payer: MEDICARE

## 2023-10-06 DIAGNOSIS — N20.0 CALCULUS OF KIDNEY: ICD-10-CM

## 2023-10-06 PROCEDURE — 74176 CT ABD & PELVIS W/O CONTRAST: CPT | Mod: MG

## 2023-10-10 ENCOUNTER — VIRTUAL VISIT (OUTPATIENT)
Dept: UROLOGY | Facility: CLINIC | Age: 78
End: 2023-10-10
Payer: MEDICARE

## 2023-10-10 DIAGNOSIS — N52.9 ERECTILE DYSFUNCTION, UNSPECIFIED ERECTILE DYSFUNCTION TYPE: ICD-10-CM

## 2023-10-10 DIAGNOSIS — R39.12 BENIGN PROSTATIC HYPERPLASIA WITH WEAK URINARY STREAM: Primary | ICD-10-CM

## 2023-10-10 DIAGNOSIS — N20.0 CALCULUS OF LEFT KIDNEY: ICD-10-CM

## 2023-10-10 DIAGNOSIS — N40.1 BENIGN PROSTATIC HYPERPLASIA WITH WEAK URINARY STREAM: Primary | ICD-10-CM

## 2023-10-10 PROCEDURE — 99213 OFFICE O/P EST LOW 20 MIN: CPT | Mod: VID | Performed by: UROLOGY

## 2023-10-10 RX ORDER — PEN NEEDLE, DIABETIC 32GX 5/32"
NEEDLE, DISPOSABLE MISCELLANEOUS
COMMUNITY
Start: 2023-08-06

## 2023-10-10 RX ORDER — BLOOD SUGAR DIAGNOSTIC
STRIP MISCELLANEOUS
COMMUNITY
Start: 2023-05-05

## 2023-10-10 RX ORDER — TAMSULOSIN HYDROCHLORIDE 0.4 MG/1
0.4 CAPSULE ORAL DAILY
Qty: 90 CAPSULE | Refills: 3 | Status: SHIPPED | OUTPATIENT
Start: 2023-10-10

## 2023-10-10 RX ORDER — BLOOD-GLUCOSE METER
EACH MISCELLANEOUS
COMMUNITY
Start: 2023-05-10

## 2023-10-10 RX ORDER — TADALAFIL 20 MG/1
20 TABLET ORAL DAILY PRN
Qty: 30 TABLET | Refills: 11 | Status: SHIPPED | OUTPATIENT
Start: 2023-10-10

## 2023-10-10 ASSESSMENT — PAIN SCALES - GENERAL: PAINLEVEL: MILD PAIN (2)

## 2023-10-10 NOTE — NURSING NOTE
Is the patient currently in the state of [place]? YES    Visit mode:VIDEO    If the visit is dropped, the patient can be reconnected by: VIDEO VISIT: Send to e-mail at: [email or web address]    Will anyone else be joining the visit? NO  (If patient encounters technical issues they should call [phone]    How would you like to obtain your AVS? MyChart    Are changes needed to the allergy or medication list? Pt stated no changes to allergies and Pt stated no med changes, except for pt is now using Solarstar 20 units daily    Reason for visit: RECHECK (Review CT results )    Nicci LARSEN

## 2023-10-10 NOTE — PROGRESS NOTES
"[facility]   CHIEF COMPLAINT   It was my pleasure to see Geoffrey Benjamin who is a 75 year old male for follow-up of BPH and family history of prostate cancer .      HPI   Geoffrey Benjamin is a very pleasant 78 year old male who presents with a history of BPH and family history of prostate cancer.    Prior patient of Dr. Sinha - last seen 5/13/20:  \"Mr. Benjamin is a 74-year-old male with a father who had prostate cancer.  Mr. Benjamin had an MRI scan and biopsies of the left apex and left mid gland that were benign.  His PSA was 2.5.  Today's PSA is 1.30.  He has had no flank pain, hematuria dysuria and has a good urinary stream.  He has no new concerns\"    7/5/21:   He is on tamsulosin 0.4mg (Flomax)   Doing really well since starting tamsulosin 0.4mg (Flomax)  He is taking a diuretic in the morning    He is having Erectile dysfunction  - tried both tadalafil (Cialis) 20mg and sildenafil 100-120mg (Viagra)   This is going pretty well and he alternates medications and knows not to take them in the same 24 hours    PVR = 55cc    11/2/2022:  Follow-up today  He is doing very well on tamsulosin  He continues to have urinary urgency and frequency, especially after taking his diuretic or when he drinks a large glass of water  Overall not very bothered by this    He continues to have good results with either tadalafil 20 mg or sildenafil 100 mg  He alternates medications and does not take them in the same 24 hours    AUASS: 1-5-3-5-1-1-1/2 = 17/18  QOL = 2  PVR = 40cc    TODAY 10/10/2023:  Starting having some pain on 9/28  Pain was on the right side  6/10, managed with ibuprofen  Then pain started to gradually improve  He did increased to tamsulosin 0.4mg (Flomax) BID  Now back to once daily  Tadalafil is working well    PHYSICAL EXAM  Patient is a 78 year old  male   Vitals: There were no vitals taken for this visit.  There is no height or weight on file to calculate BMI.  General Appearance Adult:   Alert, no acute " distress, oriented  HENT: throat/mouth:normal, good dentition  Lungs: no respiratory distress, or pursed lip breathing  Heart: No obvious jugular venous distension present  Abdomen: soft, nontender, no organomegaly or masses  Musculoskeltal: extremities normal, no peripheral edema  Skin: no suspicious lesions or rashes  Neuro: Alert, oriented, speech and mentation normal  Psych: affect and mood normal  Gait: Normal    Component PSA Diag Urologic Phys   Latest Ref Rng & Units 0.00 - 4.00 ng/mL   4/30/2019 2.50   5/13/2020 1.30     IMAGING:  CT ABD/PEL 10/6/2023:  FINDINGS:   LOWER CHEST: Coronary artery calcifications. Mildly elevated left hemidiaphragm.     HEPATOBILIARY: Cholelithiasis. No acute liver abnormality.     PANCREAS: Normal.     SPLEEN: Normal.     ADRENAL GLANDS: Normal.     KIDNEYS/BLADDER: No hydronephrosis. Nonobstructing stones at the lower left kidney. Two are identified with an example measuring 7 mm at the lower pole series 3 image 93. Bladder appears unremarkable. No ureter stones.     BOWEL: No obstruction or inflammation. Moderate stool in the colon.     LYMPH NODES: Normal.     VASCULATURE: Scattered calcifications.     PELVIC ORGANS: Prostate measures 5.7 cm.     MUSCULOSKELETAL: Diffuse degenerative changes of the spine. Partially visualized sternotomy. No aggressive bone lesion.                                                              IMPRESSION:   1.  Nonobstructing stones at the lower left kidney. No hydronephrosis.  2.  Enlarged prostate.    ASSESSMENT and PLAN  78-year-old man with history of BPH with LUTS and erectile dysfunction also with a family history of prostate cancer. Episode of back pain with non-obstructive left stones    Left lower pole kidney stones  - I reviewed his CT scan and reviewed the images personally.  I shared the images with the patient via our video connection  - He does have 2 lower pole nonobstructive kidney stones and we discussed that this would not be  the source of any right-sided or left-sided pain  - We will plan for follow-up in 1 year with a surveillance KUB    BPH with LUTS  -He is doing very well on tamsulosin 0.4 mg daily  -Refill prescription sent to the pharmacy  - Continue with medical management    Erectile dysfunction  -He does well with Cialis 20 mg as needed.    -Refill prescription sent to the pharmacy  -Follow-up in 1 year for symptom check    Follow-up in 1 year with surveillance KUB      Time spent: 20 minutes spent on the date of the encounter doing chart review, history and exam, documentation and further activities as noted above.    Ike Lora MD   Urology  [facility]  [facility] Phone: [phone]  [facility] Phone: [phone]      Virtual Visit Details    Type of service:  Video Visit   Video Start Time:  12:56 PM  Video End Time: 1:08 PM    Originating Location (pt. Location): Home    Distant Location (provider location):  On-site  Platform used for Video Visit: Chiara

## 2024-01-13 ENCOUNTER — HEALTH MAINTENANCE LETTER (OUTPATIENT)
Age: 79
End: 2024-01-13

## 2024-06-01 ENCOUNTER — HEALTH MAINTENANCE LETTER (OUTPATIENT)
Age: 79
End: 2024-06-01

## 2024-08-21 ENCOUNTER — TELEPHONE (OUTPATIENT)
Dept: UROLOGY | Facility: CLINIC | Age: 79
End: 2024-08-21

## 2024-08-21 NOTE — TELEPHONE ENCOUNTER
Patient is scheduled for an upcoming appointment with   on 10/10/24.     Per last note patient was to: - We will plan for follow-up in 1 year with a surveillance KUB     Orders placed: KUB    Patient is scheduled 10/8/24 to complete KUB lab/imaging.     Routing to the inbasket to ensure patient completes lab/imaging    Crystal Cary MA on 8/21/2024 at 4:25 PM      Future Appointments 8/21/2024 - 2/17/2025        Date Visit Type Length Department Provider     10/8/2024 11:00 AM XR ABDOMEN 2 VIEWS 20 min SH XRAY SHXR3    Location Instructions:     [facility] [address] [place] [address]  Parking Self parking is available for Imaging customers in the [facility] s Skyway Parking Ramp, across [facility] from the hospital. Access the skyway from Level C of the parking ramp. There is no attendant on duty in the parking ramp. Pay stations accept Visa, MasterCard, Discover, American Express, and cash.  Self Parking rates 0-15 minutes $0.00 15-30 minutes $3.00 30 min.-1 hr. $4.00 1 -2 hours $5.00 2-3 hours $6.00 3-5 hours $7.00 5-7 hours $8.00 7-8 hours $9.00 8-24 hours $10.00 Each additional 24 hours - $5 (car must remain in the ramp or daily parking rates apply). Above prices include tax. Rates are subject to change.*Under 15 minutes: no charge.  Entrance and check-in location If you choose to self-park in the Skyway Ramp, the skyway will bring you into the building to the floor above the Skyway Lobby. Proceed down 1 floor (stairs or elevator access are available when you enter the hospital). If you choose to  park, you ll enter the Skyway Lobby from the  station at Door 2. Both self-park and  customers can check in at the Welcome Desk in the Skyway Lobby.  This appointment is in a hospital-based location.&nbsp; Before your visit, you may want to check with your insurance company for coverage and referral options, including cost differences between services provided in  different clinic settings.&nbsp; For more information visit this link on the [employer] Website:&nbsp; [email or web address]              10/10/2024 10:30 AM RETURN PATIENT 15 min [facility] UROLOGY Ike Lora MD

## 2024-10-08 ENCOUNTER — HOSPITAL ENCOUNTER (OUTPATIENT)
Dept: GENERAL RADIOLOGY | Facility: CLINIC | Age: 79
Discharge: HOME | End: 2024-10-08
Attending: UROLOGY | Admitting: UROLOGY
Payer: MEDICARE

## 2024-10-08 DIAGNOSIS — N20.0 CALCULUS OF LEFT KIDNEY: ICD-10-CM

## 2024-10-08 PROCEDURE — 74018 RADEX ABDOMEN 1 VIEW: CPT

## 2024-10-08 NOTE — TELEPHONE ENCOUNTER
Imaging completed- report has not finished. Closing encounter.     Carlyn Ramey LPN on 10/8/2024 at 11:21 AM

## 2024-10-10 ENCOUNTER — VIRTUAL VISIT (OUTPATIENT)
Dept: UROLOGY | Facility: CLINIC | Age: 79
End: 2024-10-10
Payer: MEDICARE

## 2024-10-10 DIAGNOSIS — N39.41 URGE INCONTINENCE: Primary | ICD-10-CM

## 2024-10-10 DIAGNOSIS — N40.1 BENIGN PROSTATIC HYPERPLASIA WITH WEAK URINARY STREAM: ICD-10-CM

## 2024-10-10 DIAGNOSIS — N52.9 ERECTILE DYSFUNCTION, UNSPECIFIED ERECTILE DYSFUNCTION TYPE: ICD-10-CM

## 2024-10-10 DIAGNOSIS — R39.12 BENIGN PROSTATIC HYPERPLASIA WITH WEAK URINARY STREAM: ICD-10-CM

## 2024-10-10 PROCEDURE — 99214 OFFICE O/P EST MOD 30 MIN: CPT | Mod: 95 | Performed by: UROLOGY

## 2024-10-10 RX ORDER — TADALAFIL 20 MG/1
20 TABLET ORAL DAILY PRN
Qty: 30 TABLET | Refills: 11 | Status: SHIPPED | OUTPATIENT
Start: 2024-10-10

## 2024-10-10 RX ORDER — TAMSULOSIN HYDROCHLORIDE 0.4 MG/1
0.4 CAPSULE ORAL DAILY
Qty: 90 CAPSULE | Refills: 3 | Status: SHIPPED | OUTPATIENT
Start: 2024-10-10

## 2024-10-10 RX ORDER — TROSPIUM CHLORIDE ER 60 MG/1
60 CAPSULE ORAL EVERY MORNING
Qty: 90 CAPSULE | Refills: 3 | Status: SHIPPED | OUTPATIENT
Start: 2024-10-10 | End: 2025-10-10

## 2024-10-10 NOTE — NURSING NOTE
Current patient location: [address] [place] [id number]    Is the patient currently in the state of [place]? YES    Visit mode:VIDEO    If the visit is dropped, the patient can be reconnected by: VIDEO VISIT: Send to e-mail at: [email or web address] by [employer]    Will anyone else be joining the visit? NO  (If patient encounters technical issues they should call [phone])    Are changes needed to the allergy or medication list? Yes   Lantus solostar pen - 20 units 1X daily     Are refills needed on medications prescribed by this physician? YES    Rooming Documentation:  Not applicable    Reason for visit: RECHECK (1 yr follow-up left lower pole kidney stones, review imaging DOS 10/8 )        Pt states he has hay fever     PT ha been having incontinence issues and he has been having kemal issues the last 3 months.  PT would like to speak to provider about this issue.     Shelly LARSEN

## 2024-10-10 NOTE — PROGRESS NOTES
"[facility]   CHIEF COMPLAINT   It was my pleasure to see Geoffrey Benjamin who is a 79 year old male for follow-up of BPH and family history of prostate cancer .      HPI   Geoffrey Benjamin is a very pleasant 79 year old male who presents with a history of BPH and family history of prostate cancer.    Prior patient of Dr. Sinha - last seen 5/13/20:  \"Mr. Benjamin is a 74-year-old male with a father who had prostate cancer.  Mr. Benjamin had an MRI scan and biopsies of the left apex and left mid gland that were benign.  His PSA was 2.5.  Today's PSA is 1.30.  He has had no flank pain, hematuria dysuria and has a good urinary stream.  He has no new concerns\"    7/5/21:   He is on tamsulosin 0.4mg (Flomax)   Doing really well since starting tamsulosin 0.4mg (Flomax)  He is taking a diuretic in the morning    He is having Erectile dysfunction  - tried both tadalafil (Cialis) 20mg and sildenafil 100-120mg (Viagra)   This is going pretty well and he alternates medications and knows not to take them in the same 24 hours    PVR = 55cc    11/2/2022:  Follow-up today  He is doing very well on tamsulosin  He continues to have urinary urgency and frequency, especially after taking his diuretic or when he drinks a large glass of water  Overall not very bothered by this    He continues to have good results with either tadalafil 20 mg or sildenafil 100 mg  He alternates medications and does not take them in the same 24 hours    AUASS: 1-5-3-5-1-1-1/2 = 17/18  QOL = 2  PVR = 40cc    10/10/2023:  Starting having some pain on 9/28  Pain was on the right side  6/10, managed with ibuprofen  Then pain started to gradually improve  He did increased to tamsulosin 0.4mg (Flomax) BID  Now back to once daily  Tadalafil is working well    TODAY 10/10/2024:  He has been dong well over the past year  No issues with kidney stones  In the last 3 months he has had increased urge incontinence   He does take tamsulosin 0.4mg (Flomax) daily  He does " feel he voids to completion   Stream is pretty stable and steady    He has had some changes to his mobility and stability   He now has a chairlift to get up and down his stairs    PHYSICAL EXAM  Patient is a 79 year old  male   Vitals: There were no vitals taken for this visit.  There is no height or weight on file to calculate BMI.  General Appearance Adult:   Alert, no acute distress, oriented  Neuro: Alert, oriented, speech and mentation normal  Psych: affect and mood normal       Component PSA Diag Urologic Phys   Latest Ref Rng & Units 0.00 - 4.00 ng/mL   4/30/2019 2.50   5/13/2020 1.30     IMAGING:  CT ABD/PEL 10/6/2023:  FINDINGS:   LOWER CHEST: Coronary artery calcifications. Mildly elevated left hemidiaphragm.     HEPATOBILIARY: Cholelithiasis. No acute liver abnormality.     PANCREAS: Normal.     SPLEEN: Normal.     ADRENAL GLANDS: Normal.     KIDNEYS/BLADDER: No hydronephrosis. Nonobstructing stones at the lower left kidney. Two are identified with an example measuring 7 mm at the lower pole series 3 image 93. Bladder appears unremarkable. No ureter stones.     BOWEL: No obstruction or inflammation. Moderate stool in the colon.     LYMPH NODES: Normal.     VASCULATURE: Scattered calcifications.     PELVIC ORGANS: Prostate measures 5.7 cm.     MUSCULOSKELETAL: Diffuse degenerative changes of the spine. Partially visualized sternotomy. No aggressive bone lesion.                                                              IMPRESSION:   1.  Nonobstructing stones at the lower left kidney. No hydronephrosis.  2.  Enlarged prostate.        KUB 10/8/2024:    IMPRESSION: There are three calculi lower pole of the left kidney  measuring up to 5 mm. No definite right-sided calculi.    ASSESSMENT and PLAN  79-year-old man with history of BPH with LUTS and erectile dysfunction also with a family history of prostate cancer, also with nonobstructive left kidney stones    Left lower pole kidney stones  -I reviewed his  updated KUB which demonstrates nonobstructive left-sided lower pole kidney stones  - He has not had any issues with flank pain over the last year    BPH with LUTS  -We discussed the pathophysiology of the bladder and the prostate and normal changes associated with the development of BPH and LUTS  - He is having worsening OAB symptoms with some urgency and urge incontinence  - Recommend he continues on tamsulosin 0.4 mg daily and refill prescription sent to the pharmacy  - We discussed the option to start an anticholinergic medication and prescription for trospium sent to the pharmacy  - If he is doing well, we will plan for follow-up in 1 year    Erectile dysfunction  -He does well with Cialis 20 mg as needed.    -Refill prescription sent to the pharmacy  -Will continue to monitor and treat this going forward      Time spent: 15 minutes spent on the date of the encounter doing chart review, history and exam, documentation and further activities as noted above.    Ike Lora MD   Urology  [facility]  [facility] Phone: [phone]  [facility] Phone: [phone]      Video-Visit Details    Type of service:  Video Visit    Video Start Time:  10:42 AM    Video End Time:10:54 AM    Originating Location (pt. Location): Home    Distant Location (provider location):  [facility]     Platform used for Video Visit: [employer]

## 2024-10-11 ENCOUNTER — TELEPHONE (OUTPATIENT)
Dept: UROLOGY | Facility: CLINIC | Age: 79
End: 2024-10-11
Payer: MEDICARE

## 2024-10-11 NOTE — TELEPHONE ENCOUNTER
Left Voicemail (1st Attempt) for the patient to call back and schedule the following:    Appointment type: Return  Provider: Dr. Lora  Return date: 10/10/2025  Specialty phone number: [phone]  Additonal Notes: 1 year with AUYAEL and SHELBY.     Debbie [facility] Complex   Dermatology, Surgery, Urology  [facility]

## 2024-10-19 ENCOUNTER — HEALTH MAINTENANCE LETTER (OUTPATIENT)
Age: 79
End: 2024-10-19

## 2024-10-21 DIAGNOSIS — R39.12 BENIGN PROSTATIC HYPERPLASIA WITH WEAK URINARY STREAM: ICD-10-CM

## 2024-10-21 DIAGNOSIS — N40.1 BENIGN PROSTATIC HYPERPLASIA WITH WEAK URINARY STREAM: ICD-10-CM

## 2024-10-23 RX ORDER — TAMSULOSIN HYDROCHLORIDE 0.4 MG/1
0.4 CAPSULE ORAL DAILY
Qty: 90 CAPSULE | Refills: 3 | OUTPATIENT
Start: 2024-10-23

## 2024-12-22 ENCOUNTER — HEALTH MAINTENANCE LETTER (OUTPATIENT)
Age: 79
End: 2024-12-22

## 2025-01-16 DIAGNOSIS — N40.1 BENIGN PROSTATIC HYPERPLASIA WITH WEAK URINARY STREAM: ICD-10-CM

## 2025-01-16 DIAGNOSIS — R39.12 BENIGN PROSTATIC HYPERPLASIA WITH WEAK URINARY STREAM: ICD-10-CM

## 2025-01-21 RX ORDER — TAMSULOSIN HYDROCHLORIDE 0.4 MG/1
0.4 CAPSULE ORAL DAILY
Qty: 90 CAPSULE | Refills: 3 | OUTPATIENT
Start: 2025-01-21

## 2025-01-25 ENCOUNTER — HEALTH MAINTENANCE LETTER (OUTPATIENT)
Age: 80
End: 2025-01-25

## 2025-02-15 DIAGNOSIS — R39.12 BENIGN PROSTATIC HYPERPLASIA WITH WEAK URINARY STREAM: ICD-10-CM

## 2025-02-15 DIAGNOSIS — N40.1 BENIGN PROSTATIC HYPERPLASIA WITH WEAK URINARY STREAM: ICD-10-CM

## 2025-02-20 RX ORDER — TAMSULOSIN HYDROCHLORIDE 0.4 MG/1
0.4 CAPSULE ORAL DAILY
Qty: 90 CAPSULE | Refills: 3 | OUTPATIENT
Start: 2025-02-20

## 2025-02-28 RX ORDER — TESTOSTERONE CYPIONATE 200 MG/ML
50 INJECTION, SOLUTION INTRAMUSCULAR
COMMUNITY

## 2025-02-28 RX ORDER — GABAPENTIN 300 MG/1
300 CAPSULE ORAL 3 TIMES DAILY
COMMUNITY

## 2025-02-28 NOTE — PROGRESS NOTES
PTA medications updated by Medication Scribe prior to surgery via phone call with patient (last doses completed by Nurse)     Medication history sources: Patient, Surescripts, and H&P  In the past week, patient estimated taking medication this percent of the time: Greater than 90%      Significant changes made to the medication list:  Patient reports no longer taking the following meds (med scribe removed from PTA med list): Insulin glargine, Robaxin, oxycodone, Sudafed, testosterone Aqueous, trospium      Additional medication history information:   Patient was advised to bring: Refresh OP.     Medication reconciliation completed by provider prior to medication history? No    Time spent in this activity: 60 minutes    The information provided in this note is only as accurate as the sources available at the time of update(s)      Prior to Admission medications    Medication Sig Last Dose Taking? Auth Provider Long Term End Date   acetaminophen (TYLENOL) 325 MG tablet Take 2 tablets (650 mg) by mouth every 4 hours as needed for other (mild pain) Unknown Yes Adán Zafar MD     amoxicillin (AMOXIL) 500 MG capsule Take 2,000 mg by mouth once as needed (1 hour prior to dental appointments 4 x 500 mg = 2,000 mg) More than a month Yes Reported, Patient     aspirin (ASA) 325 MG EC tablet Take 1 tablet (325 mg) by mouth daily 2/27/2025 Evening Yes Cheryle Ojeda, PAFarrahC No    atorvastatin (LIPITOR) 10 MG tablet Take 10 mg by mouth At Bedtime Evening Yes Reported, Patient No    BIOFLAVONOIDS PO Take 1 capsule by mouth daily. 2/27/2025 Morning Yes Reported, Patient     Carboxymethylcellul-Glycerin (REFRESH OPTIVE OP) Apply 1 drop to eye 2 times daily Morning Yes Reported, Patient     chlorpheniramine-pseudoePHEDrine (PSEUDO-GEST PLUS) 4-60 MG TABS per tablet Take 1 tablet by mouth every evening. 2/27/2025 Evening Yes Reported, Patient     Coenzyme Q10 (COQ10) 200 MG CAPS Take 1 capsule by mouth At Bedtime 2/27/2025  Evening Yes Unknown, Entered By History     empagliflozin (JARDIANCE) 10 MG TABS tablet Take 10 mg by mouth daily Morning Yes Reported, Patient     fish oil-omega-3 fatty acids 1000 MG capsule Take 1 g by mouth daily 2/27/2025 Morning Yes Unknown, Entered By History No    gabapentin (NEURONTIN) 300 MG capsule Take 300 mg by mouth 3 times daily. Morning Yes Reported, Patient Yes    glimepiride (AMARYL) 4 MG tablet Take 6 mg by mouth every morning (before breakfast) (1.5 x 4 mg = 6 mg) Morning Yes Reported, Patient Yes    Glucosamine-Chondroit-Vit C-Mn (GLUCOSAMINE CHONDROITIN 1500 COMPLEX) CAPS Take 1 tablet by mouth 2 times daily  2/27/2025 Morning Yes Reported, Patient     hydrochlorothiazide (HYDRODIURIL) 25 MG tablet Take 25 mg by mouth daily  Morning Yes Reported, Patient Yes    insulin glargine (LANTUS PEN) 100 UNIT/ML pen Inject 21 Units subcutaneously at bedtime. Evening Yes Reported, Patient No    levothyroxine (SYNTHROID/LEVOTHROID) 112 MCG tablet Take 112 mcg by mouth every morning (before breakfast) Morning Yes Reported, Patient Yes    magnesium hydroxide (MILK OF MAGNESIA) 400 MG/5ML suspension Take 15 mLs by mouth 2 times daily as needed for constipation or heartburn Unknown Yes Adán Zafar MD     metFORMIN (GLUCOPHAGE) 500 MG tablet Take 1,500 mg by mouth every evening (3 x 500 mg = 1,500 mg) Evening Yes Reported, Patient No    multivitamin w/minerals (THERA-VIT-M) tablet Take 1 tablet by mouth daily 2/27/2025 Morning Yes Reported, Patient No    testosterone cypionate (DEPOTESTOSTERONE) 200 MG/ML injection Inject 50 mg into the muscle every 21 days. Morning Yes Reported, Patient Yes    vitamin B complex with vitamin C (STRESS TAB) tablet Take 1 tablet by mouth daily 2/27/2025 Morning Yes Reported, Patient     BD PEN NEEDLE SARMAD 2ND GEN 32G X 4 MM miscellaneous USE ONCE DAILY AS DIRECTED   Reported, Patient     Blood Glucose Monitoring Suppl (ONETOUCH VERIO FLEX SYSTEM) w/Device KIT USE AS  DIRECTED TO CHECK GLUCOSE   Reported, Patient     metoprolol succinate ER (TOPROL-XL) 50 MG 24 hr tablet Take 1 tablet (50 mg) by mouth daily Morning Yes Shelodn Wong PA-C Yes    minocycline (DYNACIN) 100 MG tablet Take 100 mg by mouth daily  Morning Yes Reported, Patient     omeprazole (PRILOSEC) 20 MG DR capsule Take 20 mg by mouth daily Morning Yes Reported, Patient     ONETOUCH VERIO IQ test strip TESTING 1 TIMES DAILY E11.9   Reported, Patient     tadalafil (CIALIS) 20 MG tablet Take 1 tablet (20 mg) by mouth daily as needed (for intercourse). Unknown Yes Ike Lora MD Yes    tamsulosin (FLOMAX) 0.4 MG capsule Take 1 capsule (0.4 mg) by mouth daily. NEED APPT FOR FUTURE REFILLS OR SEEK REFILLS FROM PRIMARY DOC Evening Yes Ike Lora MD         Medication history completed by: Roro Arevalo LPN

## 2025-03-05 ENCOUNTER — ANESTHESIA EVENT (OUTPATIENT)
Dept: SURGERY | Facility: CLINIC | Age: 80
End: 2025-03-05
Payer: MEDICARE

## 2025-03-05 ENCOUNTER — APPOINTMENT (OUTPATIENT)
Dept: GENERAL RADIOLOGY | Facility: CLINIC | Age: 80
DRG: 472 | End: 2025-03-05
Attending: ORTHOPAEDIC SURGERY
Payer: MEDICARE

## 2025-03-05 ENCOUNTER — ANESTHESIA (OUTPATIENT)
Dept: SURGERY | Facility: CLINIC | Age: 80
End: 2025-03-05
Payer: MEDICARE

## 2025-03-05 ENCOUNTER — HOSPITAL ENCOUNTER (INPATIENT)
Facility: CLINIC | Age: 80
DRG: 472 | End: 2025-03-05
Attending: ORTHOPAEDIC SURGERY | Admitting: ORTHOPAEDIC SURGERY
Payer: MEDICARE

## 2025-03-05 ENCOUNTER — DOCUMENTATION ONLY (OUTPATIENT)
Dept: ORTHOPEDICS | Facility: CLINIC | Age: 80
End: 2025-03-05

## 2025-03-05 DIAGNOSIS — Z98.1 S/P CERVICAL SPINAL FUSION: Primary | ICD-10-CM

## 2025-03-05 DIAGNOSIS — Z96.651 S/P TOTAL KNEE ARTHROPLASTY, RIGHT: ICD-10-CM

## 2025-03-05 LAB
ABO + RH BLD: NORMAL
BLD GP AB SCN SERPL QL: NEGATIVE
GLUCOSE BLDC GLUCOMTR-MCNC: 175 MG/DL (ref 70–99)
GLUCOSE BLDC GLUCOMTR-MCNC: 227 MG/DL (ref 70–99)
GLUCOSE BLDC GLUCOMTR-MCNC: 289 MG/DL (ref 70–99)
GLUCOSE SERPL-MCNC: 175 MG/DL (ref 70–99)
POTASSIUM SERPL-SCNC: 3.5 MMOL/L (ref 3.4–5.3)
SPECIMEN EXP DATE BLD: NORMAL

## 2025-03-05 PROCEDURE — 00NW0ZZ RELEASE CERVICAL SPINAL CORD, OPEN APPROACH: ICD-10-PCS | Performed by: ORTHOPAEDIC SURGERY

## 2025-03-05 PROCEDURE — 999N000141 HC STATISTIC PRE-PROCEDURE NURSING ASSESSMENT: Performed by: ORTHOPAEDIC SURGERY

## 2025-03-05 PROCEDURE — 258N000003 HC RX IP 258 OP 636: Performed by: ANESTHESIOLOGY

## 2025-03-05 PROCEDURE — L0172 CERV COL SR FOAM 2PC PRE OTS: HCPCS

## 2025-03-05 PROCEDURE — 999N000179 XR SURGERY CARM FLUORO LESS THAN 5 MIN W STILLS

## 2025-03-05 PROCEDURE — 250N000013 HC RX MED GY IP 250 OP 250 PS 637: Performed by: ORTHOPAEDIC SURGERY

## 2025-03-05 PROCEDURE — 370N000017 HC ANESTHESIA TECHNICAL FEE, PER MIN: Performed by: ORTHOPAEDIC SURGERY

## 2025-03-05 PROCEDURE — 120N000001 HC R&B MED SURG/OB

## 2025-03-05 PROCEDURE — 250N000009 HC RX 250: Performed by: NURSE ANESTHETIST, CERTIFIED REGISTERED

## 2025-03-05 PROCEDURE — 258N000003 HC RX IP 258 OP 636: Performed by: STUDENT IN AN ORGANIZED HEALTH CARE EDUCATION/TRAINING PROGRAM

## 2025-03-05 PROCEDURE — 250N000013 HC RX MED GY IP 250 OP 250 PS 637: Performed by: ANESTHESIOLOGY

## 2025-03-05 PROCEDURE — 272N000001 HC OR GENERAL SUPPLY STERILE: Performed by: ORTHOPAEDIC SURGERY

## 2025-03-05 PROCEDURE — 82947 ASSAY GLUCOSE BLOOD QUANT: CPT | Performed by: ANESTHESIOLOGY

## 2025-03-05 PROCEDURE — 250N000025 HC SEVOFLURANE, PER MIN: Performed by: ORTHOPAEDIC SURGERY

## 2025-03-05 PROCEDURE — C1713 ANCHOR/SCREW BN/BN,TIS/BN: HCPCS | Performed by: ORTHOPAEDIC SURGERY

## 2025-03-05 PROCEDURE — 86900 BLOOD TYPING SEROLOGIC ABO: CPT | Performed by: ANESTHESIOLOGY

## 2025-03-05 PROCEDURE — 250N000011 HC RX IP 250 OP 636: Performed by: ORTHOPAEDIC SURGERY

## 2025-03-05 PROCEDURE — 86850 RBC ANTIBODY SCREEN: CPT | Performed by: ANESTHESIOLOGY

## 2025-03-05 PROCEDURE — 0RT30ZZ RESECTION OF CERVICAL VERTEBRAL DISC, OPEN APPROACH: ICD-10-PCS | Performed by: ORTHOPAEDIC SURGERY

## 2025-03-05 PROCEDURE — 84132 ASSAY OF SERUM POTASSIUM: CPT | Performed by: ANESTHESIOLOGY

## 2025-03-05 PROCEDURE — 250N000011 HC RX IP 250 OP 636: Mod: JZ | Performed by: ANESTHESIOLOGY

## 2025-03-05 PROCEDURE — 250N000013 HC RX MED GY IP 250 OP 250 PS 637: Performed by: STUDENT IN AN ORGANIZED HEALTH CARE EDUCATION/TRAINING PROGRAM

## 2025-03-05 PROCEDURE — 250N000011 HC RX IP 250 OP 636: Performed by: NURSE ANESTHETIST, CERTIFIED REGISTERED

## 2025-03-05 PROCEDURE — L1499 SPINAL ORTHOSIS NOS: HCPCS

## 2025-03-05 PROCEDURE — 250N000011 HC RX IP 250 OP 636: Performed by: STUDENT IN AN ORGANIZED HEALTH CARE EDUCATION/TRAINING PROGRAM

## 2025-03-05 PROCEDURE — 99222 1ST HOSP IP/OBS MODERATE 55: CPT

## 2025-03-05 PROCEDURE — 360N000085 HC SURGERY LEVEL 5 W/ FLUORO, PER MIN: Performed by: ORTHOPAEDIC SURGERY

## 2025-03-05 PROCEDURE — 710N000009 HC RECOVERY PHASE 1, LEVEL 1, PER MIN: Performed by: ORTHOPAEDIC SURGERY

## 2025-03-05 PROCEDURE — 0RG20A0 FUSION OF 2 OR MORE CERVICAL VERTEBRAL JOINTS WITH INTERBODY FUSION DEVICE, ANTERIOR APPROACH, ANTERIOR COLUMN, OPEN APPROACH: ICD-10-PCS | Performed by: ORTHOPAEDIC SURGERY

## 2025-03-05 PROCEDURE — 258N000003 HC RX IP 258 OP 636: Performed by: NURSE ANESTHETIST, CERTIFIED REGISTERED

## 2025-03-05 PROCEDURE — 250N000012 HC RX MED GY IP 250 OP 636 PS 637

## 2025-03-05 PROCEDURE — 01N10ZZ RELEASE CERVICAL NERVE, OPEN APPROACH: ICD-10-PCS | Performed by: ORTHOPAEDIC SURGERY

## 2025-03-05 PROCEDURE — 250N000009 HC RX 250: Performed by: ORTHOPAEDIC SURGERY

## 2025-03-05 DEVICE — COHERE CERVICAL, 7X16X14MM 7°
Type: IMPLANTABLE DEVICE | Site: SPINE CERVICAL | Status: FUNCTIONAL
Brand: COHERE

## 2025-03-05 DEVICE — COHERE CERVICAL, 6X16X14MM 7°
Type: IMPLANTABLE DEVICE | Site: SPINE CERVICAL | Status: FUNCTIONAL
Brand: COHERE

## 2025-03-05 DEVICE — IMPLANTABLE DEVICE: Type: IMPLANTABLE DEVICE | Site: SPINE CERVICAL | Status: FUNCTIONAL

## 2025-03-05 DEVICE — MAGNETOS EASYPACK PUTTY 2.5CC 1-2MM USA
Type: IMPLANTABLE DEVICE | Site: SPINE CERVICAL | Status: FUNCTIONAL
Brand: MAGNETOS

## 2025-03-05 RX ORDER — DEXTROSE MONOHYDRATE 25 G/50ML
25-50 INJECTION, SOLUTION INTRAVENOUS
Status: DISCONTINUED | OUTPATIENT
Start: 2025-03-05 | End: 2025-03-07 | Stop reason: HOSPADM

## 2025-03-05 RX ORDER — LIDOCAINE HYDROCHLORIDE 20 MG/ML
INJECTION, SOLUTION INFILTRATION; PERINEURAL PRN
Status: DISCONTINUED | OUTPATIENT
Start: 2025-03-05 | End: 2025-03-05

## 2025-03-05 RX ORDER — TAMSULOSIN HYDROCHLORIDE 0.4 MG/1
0.4 CAPSULE ORAL DAILY
Status: DISCONTINUED | OUTPATIENT
Start: 2025-03-05 | End: 2025-03-07 | Stop reason: HOSPADM

## 2025-03-05 RX ORDER — CEFAZOLIN SODIUM/WATER 2 G/20 ML
2 SYRINGE (ML) INTRAVENOUS
Status: COMPLETED | OUTPATIENT
Start: 2025-03-05 | End: 2025-03-05

## 2025-03-05 RX ORDER — GABAPENTIN 100 MG/1
100 CAPSULE ORAL
Status: COMPLETED | OUTPATIENT
Start: 2025-03-05 | End: 2025-03-05

## 2025-03-05 RX ORDER — PROPOFOL 10 MG/ML
INJECTION, EMULSION INTRAVENOUS PRN
Status: DISCONTINUED | OUTPATIENT
Start: 2025-03-05 | End: 2025-03-05

## 2025-03-05 RX ORDER — OXYCODONE HYDROCHLORIDE 5 MG/1
5 TABLET ORAL EVERY 4 HOURS PRN
Status: DISCONTINUED | OUTPATIENT
Start: 2025-03-05 | End: 2025-03-07 | Stop reason: HOSPADM

## 2025-03-05 RX ORDER — BISACODYL 10 MG
10 SUPPOSITORY, RECTAL RECTAL DAILY PRN
Status: DISCONTINUED | OUTPATIENT
Start: 2025-03-05 | End: 2025-03-07 | Stop reason: HOSPADM

## 2025-03-05 RX ORDER — DEXAMETHASONE SODIUM PHOSPHATE 4 MG/ML
4 INJECTION, SOLUTION INTRA-ARTICULAR; INTRALESIONAL; INTRAMUSCULAR; INTRAVENOUS; SOFT TISSUE EVERY 6 HOURS
Status: COMPLETED | OUTPATIENT
Start: 2025-03-05 | End: 2025-03-06

## 2025-03-05 RX ORDER — ACETAMINOPHEN 325 MG/1
650 TABLET ORAL EVERY 6 HOURS PRN
Status: DISCONTINUED | OUTPATIENT
Start: 2025-03-05 | End: 2025-03-07 | Stop reason: HOSPADM

## 2025-03-05 RX ORDER — ATORVASTATIN CALCIUM 10 MG/1
10 TABLET, FILM COATED ORAL AT BEDTIME
Status: DISCONTINUED | OUTPATIENT
Start: 2025-03-05 | End: 2025-03-07 | Stop reason: HOSPADM

## 2025-03-05 RX ORDER — DEXAMETHASONE SODIUM PHOSPHATE 4 MG/ML
4 INJECTION, SOLUTION INTRA-ARTICULAR; INTRALESIONAL; INTRAMUSCULAR; INTRAVENOUS; SOFT TISSUE
Status: DISCONTINUED | OUTPATIENT
Start: 2025-03-05 | End: 2025-03-05 | Stop reason: HOSPADM

## 2025-03-05 RX ORDER — NALOXONE HYDROCHLORIDE 0.4 MG/ML
0.1 INJECTION, SOLUTION INTRAMUSCULAR; INTRAVENOUS; SUBCUTANEOUS
Status: DISCONTINUED | OUTPATIENT
Start: 2025-03-05 | End: 2025-03-05 | Stop reason: HOSPADM

## 2025-03-05 RX ORDER — AMOXICILLIN 250 MG
1 CAPSULE ORAL 2 TIMES DAILY
Status: DISCONTINUED | OUTPATIENT
Start: 2025-03-05 | End: 2025-03-07 | Stop reason: HOSPADM

## 2025-03-05 RX ORDER — ONDANSETRON 2 MG/ML
4 INJECTION INTRAMUSCULAR; INTRAVENOUS EVERY 6 HOURS PRN
Status: DISCONTINUED | OUTPATIENT
Start: 2025-03-05 | End: 2025-03-07 | Stop reason: HOSPADM

## 2025-03-05 RX ORDER — METOPROLOL SUCCINATE 50 MG/1
50 TABLET, EXTENDED RELEASE ORAL DAILY
Status: DISCONTINUED | OUTPATIENT
Start: 2025-03-06 | End: 2025-03-05

## 2025-03-05 RX ORDER — LIDOCAINE 40 MG/G
CREAM TOPICAL
Status: DISCONTINUED | OUTPATIENT
Start: 2025-03-05 | End: 2025-03-07 | Stop reason: HOSPADM

## 2025-03-05 RX ORDER — METOPROLOL SUCCINATE 50 MG/1
50 TABLET, EXTENDED RELEASE ORAL DAILY
Status: DISCONTINUED | OUTPATIENT
Start: 2025-03-05 | End: 2025-03-05 | Stop reason: HOSPADM

## 2025-03-05 RX ORDER — GABAPENTIN 300 MG/1
300 CAPSULE ORAL 3 TIMES DAILY
Status: DISCONTINUED | OUTPATIENT
Start: 2025-03-05 | End: 2025-03-07 | Stop reason: HOSPADM

## 2025-03-05 RX ORDER — PANTOPRAZOLE SODIUM 40 MG/1
40 TABLET, DELAYED RELEASE ORAL DAILY
Status: DISCONTINUED | OUTPATIENT
Start: 2025-03-05 | End: 2025-03-07 | Stop reason: HOSPADM

## 2025-03-05 RX ORDER — HYDROCHLOROTHIAZIDE 25 MG/1
25 TABLET ORAL DAILY
Status: DISCONTINUED | OUTPATIENT
Start: 2025-03-06 | End: 2025-03-07 | Stop reason: HOSPADM

## 2025-03-05 RX ORDER — POLYETHYLENE GLYCOL 3350 17 G/17G
17 POWDER, FOR SOLUTION ORAL DAILY
Status: DISCONTINUED | OUTPATIENT
Start: 2025-03-06 | End: 2025-03-07 | Stop reason: HOSPADM

## 2025-03-05 RX ORDER — HYDROMORPHONE HCL IN WATER/PF 6 MG/30 ML
0.2 PATIENT CONTROLLED ANALGESIA SYRINGE INTRAVENOUS
Status: DISCONTINUED | OUTPATIENT
Start: 2025-03-05 | End: 2025-03-07 | Stop reason: HOSPADM

## 2025-03-05 RX ORDER — ACETAMINOPHEN 325 MG/1
975 TABLET ORAL EVERY 8 HOURS
Status: DISCONTINUED | OUTPATIENT
Start: 2025-03-05 | End: 2025-03-07 | Stop reason: HOSPADM

## 2025-03-05 RX ORDER — HYDROXYZINE HYDROCHLORIDE 10 MG/1
10 TABLET, FILM COATED ORAL EVERY 6 HOURS PRN
Status: DISCONTINUED | OUTPATIENT
Start: 2025-03-05 | End: 2025-03-07 | Stop reason: HOSPADM

## 2025-03-05 RX ORDER — NALOXONE HYDROCHLORIDE 0.4 MG/ML
0.4 INJECTION, SOLUTION INTRAMUSCULAR; INTRAVENOUS; SUBCUTANEOUS
Status: DISCONTINUED | OUTPATIENT
Start: 2025-03-05 | End: 2025-03-07 | Stop reason: HOSPADM

## 2025-03-05 RX ORDER — FENTANYL CITRATE 50 UG/ML
INJECTION, SOLUTION INTRAMUSCULAR; INTRAVENOUS PRN
Status: DISCONTINUED | OUTPATIENT
Start: 2025-03-05 | End: 2025-03-05

## 2025-03-05 RX ORDER — SODIUM CHLORIDE, SODIUM LACTATE, POTASSIUM CHLORIDE, CALCIUM CHLORIDE 600; 310; 30; 20 MG/100ML; MG/100ML; MG/100ML; MG/100ML
INJECTION, SOLUTION INTRAVENOUS CONTINUOUS
Status: DISCONTINUED | OUTPATIENT
Start: 2025-03-05 | End: 2025-03-05 | Stop reason: HOSPADM

## 2025-03-05 RX ORDER — FENTANYL CITRATE 0.05 MG/ML
25 INJECTION, SOLUTION INTRAMUSCULAR; INTRAVENOUS EVERY 5 MIN PRN
Status: DISCONTINUED | OUTPATIENT
Start: 2025-03-05 | End: 2025-03-05 | Stop reason: HOSPADM

## 2025-03-05 RX ORDER — FENTANYL CITRATE 0.05 MG/ML
50 INJECTION, SOLUTION INTRAMUSCULAR; INTRAVENOUS EVERY 5 MIN PRN
Status: DISCONTINUED | OUTPATIENT
Start: 2025-03-05 | End: 2025-03-05 | Stop reason: HOSPADM

## 2025-03-05 RX ORDER — METHOCARBAMOL 500 MG/1
500 TABLET, FILM COATED ORAL EVERY 6 HOURS PRN
Status: DISCONTINUED | OUTPATIENT
Start: 2025-03-05 | End: 2025-03-07 | Stop reason: HOSPADM

## 2025-03-05 RX ORDER — MAGNESIUM HYDROXIDE 1200 MG/15ML
LIQUID ORAL PRN
Status: DISCONTINUED | OUTPATIENT
Start: 2025-03-05 | End: 2025-03-05 | Stop reason: HOSPADM

## 2025-03-05 RX ORDER — MINOCYCLINE HYDROCHLORIDE 100 MG/1
100 CAPSULE ORAL DAILY
Status: DISCONTINUED | OUTPATIENT
Start: 2025-03-05 | End: 2025-03-07 | Stop reason: HOSPADM

## 2025-03-05 RX ORDER — NALOXONE HYDROCHLORIDE 0.4 MG/ML
0.2 INJECTION, SOLUTION INTRAMUSCULAR; INTRAVENOUS; SUBCUTANEOUS
Status: DISCONTINUED | OUTPATIENT
Start: 2025-03-05 | End: 2025-03-07 | Stop reason: HOSPADM

## 2025-03-05 RX ORDER — ONDANSETRON 2 MG/ML
INJECTION INTRAMUSCULAR; INTRAVENOUS PRN
Status: DISCONTINUED | OUTPATIENT
Start: 2025-03-05 | End: 2025-03-05

## 2025-03-05 RX ORDER — ONDANSETRON 2 MG/ML
4 INJECTION INTRAMUSCULAR; INTRAVENOUS EVERY 30 MIN PRN
Status: DISCONTINUED | OUTPATIENT
Start: 2025-03-05 | End: 2025-03-05 | Stop reason: HOSPADM

## 2025-03-05 RX ORDER — HYDROMORPHONE HCL IN WATER/PF 6 MG/30 ML
0.4 PATIENT CONTROLLED ANALGESIA SYRINGE INTRAVENOUS EVERY 5 MIN PRN
Status: DISCONTINUED | OUTPATIENT
Start: 2025-03-05 | End: 2025-03-05 | Stop reason: HOSPADM

## 2025-03-05 RX ORDER — ONDANSETRON 4 MG/1
4 TABLET, ORALLY DISINTEGRATING ORAL EVERY 6 HOURS PRN
Status: DISCONTINUED | OUTPATIENT
Start: 2025-03-05 | End: 2025-03-07 | Stop reason: HOSPADM

## 2025-03-05 RX ORDER — CEFAZOLIN SODIUM/WATER 2 G/20 ML
2 SYRINGE (ML) INTRAVENOUS SEE ADMIN INSTRUCTIONS
Status: DISCONTINUED | OUTPATIENT
Start: 2025-03-05 | End: 2025-03-05 | Stop reason: HOSPADM

## 2025-03-05 RX ORDER — ONDANSETRON 4 MG/1
4 TABLET, ORALLY DISINTEGRATING ORAL EVERY 30 MIN PRN
Status: DISCONTINUED | OUTPATIENT
Start: 2025-03-05 | End: 2025-03-05 | Stop reason: HOSPADM

## 2025-03-05 RX ORDER — HYDRALAZINE HYDROCHLORIDE 20 MG/ML
10 INJECTION INTRAMUSCULAR; INTRAVENOUS EVERY 4 HOURS PRN
Status: DISCONTINUED | OUTPATIENT
Start: 2025-03-05 | End: 2025-03-07 | Stop reason: HOSPADM

## 2025-03-05 RX ORDER — CEFAZOLIN SODIUM 1 G/3ML
1 INJECTION, POWDER, FOR SOLUTION INTRAMUSCULAR; INTRAVENOUS EVERY 8 HOURS
Status: COMPLETED | OUTPATIENT
Start: 2025-03-05 | End: 2025-03-06

## 2025-03-05 RX ORDER — HYDROCHLOROTHIAZIDE 25 MG/1
25 TABLET ORAL DAILY
Status: DISCONTINUED | OUTPATIENT
Start: 2025-03-05 | End: 2025-03-05

## 2025-03-05 RX ORDER — HYDROMORPHONE HCL IN WATER/PF 6 MG/30 ML
0.2 PATIENT CONTROLLED ANALGESIA SYRINGE INTRAVENOUS EVERY 5 MIN PRN
Status: DISCONTINUED | OUTPATIENT
Start: 2025-03-05 | End: 2025-03-05 | Stop reason: HOSPADM

## 2025-03-05 RX ORDER — LEVOTHYROXINE SODIUM 112 UG/1
112 TABLET ORAL
Status: DISCONTINUED | OUTPATIENT
Start: 2025-03-05 | End: 2025-03-07 | Stop reason: HOSPADM

## 2025-03-05 RX ORDER — SODIUM CHLORIDE 9 MG/ML
INJECTION, SOLUTION INTRAVENOUS CONTINUOUS
Status: DISCONTINUED | OUTPATIENT
Start: 2025-03-05 | End: 2025-03-07 | Stop reason: HOSPADM

## 2025-03-05 RX ORDER — METOPROLOL SUCCINATE 50 MG/1
50 TABLET, EXTENDED RELEASE ORAL DAILY
Status: DISCONTINUED | OUTPATIENT
Start: 2025-03-06 | End: 2025-03-07 | Stop reason: HOSPADM

## 2025-03-05 RX ORDER — HYDROMORPHONE HCL IN WATER/PF 6 MG/30 ML
0.4 PATIENT CONTROLLED ANALGESIA SYRINGE INTRAVENOUS
Status: DISCONTINUED | OUTPATIENT
Start: 2025-03-05 | End: 2025-03-07 | Stop reason: HOSPADM

## 2025-03-05 RX ORDER — OXYCODONE HYDROCHLORIDE 5 MG/1
10 TABLET ORAL EVERY 4 HOURS PRN
Status: DISCONTINUED | OUTPATIENT
Start: 2025-03-05 | End: 2025-03-07 | Stop reason: HOSPADM

## 2025-03-05 RX ORDER — DEXAMETHASONE SODIUM PHOSPHATE 10 MG/ML
10 INJECTION, SOLUTION INTRAMUSCULAR; INTRAVENOUS ONCE
Status: COMPLETED | OUTPATIENT
Start: 2025-03-05 | End: 2025-03-05

## 2025-03-05 RX ORDER — APREPITANT 40 MG/1
40 CAPSULE ORAL ONCE
Status: COMPLETED | OUTPATIENT
Start: 2025-03-05 | End: 2025-03-05

## 2025-03-05 RX ORDER — PROCHLORPERAZINE MALEATE 5 MG/1
5 TABLET ORAL EVERY 6 HOURS PRN
Status: DISCONTINUED | OUTPATIENT
Start: 2025-03-05 | End: 2025-03-07 | Stop reason: HOSPADM

## 2025-03-05 RX ORDER — NICOTINE POLACRILEX 4 MG
15-30 LOZENGE BUCCAL
Status: DISCONTINUED | OUTPATIENT
Start: 2025-03-05 | End: 2025-03-07 | Stop reason: HOSPADM

## 2025-03-05 RX ADMIN — PROPOFOL 150 MG: 10 INJECTION, EMULSION INTRAVENOUS at 08:08

## 2025-03-05 RX ADMIN — LEVOTHYROXINE SODIUM 112 MCG: 112 TABLET ORAL at 17:50

## 2025-03-05 RX ADMIN — CEFAZOLIN 1 G: 1 INJECTION, POWDER, FOR SOLUTION INTRAMUSCULAR; INTRAVENOUS at 23:38

## 2025-03-05 RX ADMIN — GABAPENTIN 300 MG: 300 CAPSULE ORAL at 17:15

## 2025-03-05 RX ADMIN — HYDROXYZINE HYDROCHLORIDE 10 MG: 10 TABLET ORAL at 17:15

## 2025-03-05 RX ADMIN — GABAPENTIN 100 MG: 100 CAPSULE ORAL at 06:28

## 2025-03-05 RX ADMIN — ROCURONIUM BROMIDE 20 MG: 50 INJECTION, SOLUTION INTRAVENOUS at 10:36

## 2025-03-05 RX ADMIN — ROCURONIUM BROMIDE 20 MG: 50 INJECTION, SOLUTION INTRAVENOUS at 09:57

## 2025-03-05 RX ADMIN — ACETAMINOPHEN 975 MG: 325 TABLET, FILM COATED ORAL at 23:38

## 2025-03-05 RX ADMIN — DEXAMETHASONE SODIUM PHOSPHATE 4 MG: 4 INJECTION, SOLUTION INTRAMUSCULAR; INTRAVENOUS at 22:01

## 2025-03-05 RX ADMIN — FENTANYL CITRATE 100 MCG: 50 INJECTION INTRAMUSCULAR; INTRAVENOUS at 08:08

## 2025-03-05 RX ADMIN — PHENYLEPHRINE HYDROCHLORIDE 100 MCG: 10 INJECTION INTRAVENOUS at 09:14

## 2025-03-05 RX ADMIN — ONDANSETRON 4 MG: 2 INJECTION INTRAMUSCULAR; INTRAVENOUS at 08:47

## 2025-03-05 RX ADMIN — Medication 2 G: at 08:15

## 2025-03-05 RX ADMIN — SENNOSIDES AND DOCUSATE SODIUM 1 TABLET: 50; 8.6 TABLET ORAL at 22:10

## 2025-03-05 RX ADMIN — MINOCYCLINE HYDROCHLORIDE 100 MG: 100 CAPSULE ORAL at 20:13

## 2025-03-05 RX ADMIN — OXYCODONE HYDROCHLORIDE 5 MG: 5 TABLET ORAL at 17:15

## 2025-03-05 RX ADMIN — METOPROLOL SUCCINATE 50 MG: 50 TABLET, EXTENDED RELEASE ORAL at 07:26

## 2025-03-05 RX ADMIN — CEFAZOLIN 1 G: 1 INJECTION, POWDER, FOR SOLUTION INTRAMUSCULAR; INTRAVENOUS at 17:17

## 2025-03-05 RX ADMIN — PHENYLEPHRINE HYDROCHLORIDE 100 MCG: 10 INJECTION INTRAVENOUS at 09:16

## 2025-03-05 RX ADMIN — DEXAMETHASONE SODIUM PHOSPHATE 10 MG: 10 INJECTION, SOLUTION INTRAMUSCULAR; INTRAVENOUS at 08:47

## 2025-03-05 RX ADMIN — PANTOPRAZOLE SODIUM 40 MG: 40 TABLET, DELAYED RELEASE ORAL at 17:16

## 2025-03-05 RX ADMIN — PHENYLEPHRINE HYDROCHLORIDE 0.5 MCG/KG/MIN: 10 INJECTION INTRAVENOUS at 08:15

## 2025-03-05 RX ADMIN — SODIUM CHLORIDE, POTASSIUM CHLORIDE, SODIUM LACTATE AND CALCIUM CHLORIDE: 600; 310; 30; 20 INJECTION, SOLUTION INTRAVENOUS at 11:00

## 2025-03-05 RX ADMIN — METHOCARBAMOL 500 MG: 500 TABLET ORAL at 20:13

## 2025-03-05 RX ADMIN — DEXAMETHASONE SODIUM PHOSPHATE 4 MG: 4 INJECTION, SOLUTION INTRAMUSCULAR; INTRAVENOUS at 17:17

## 2025-03-05 RX ADMIN — SODIUM CHLORIDE: 0.9 INJECTION, SOLUTION INTRAVENOUS at 15:54

## 2025-03-05 RX ADMIN — TAMSULOSIN HYDROCHLORIDE 0.4 MG: 0.4 CAPSULE ORAL at 17:15

## 2025-03-05 RX ADMIN — OXYCODONE HYDROCHLORIDE 10 MG: 5 TABLET ORAL at 22:01

## 2025-03-05 RX ADMIN — Medication 200 MG: at 11:18

## 2025-03-05 RX ADMIN — SODIUM CHLORIDE, POTASSIUM CHLORIDE, SODIUM LACTATE AND CALCIUM CHLORIDE: 600; 310; 30; 20 INJECTION, SOLUTION INTRAVENOUS at 06:28

## 2025-03-05 RX ADMIN — LIDOCAINE HYDROCHLORIDE 60 MG: 20 INJECTION, SOLUTION INFILTRATION; PERINEURAL at 08:08

## 2025-03-05 RX ADMIN — ACETAMINOPHEN 975 MG: 325 TABLET, FILM COATED ORAL at 17:15

## 2025-03-05 RX ADMIN — APREPITANT 40 MG: 40 CAPSULE ORAL at 07:26

## 2025-03-05 RX ADMIN — ROCURONIUM BROMIDE 10 MG: 50 INJECTION, SOLUTION INTRAVENOUS at 09:44

## 2025-03-05 RX ADMIN — HYDROMORPHONE HYDROCHLORIDE 0.5 MG: 1 INJECTION, SOLUTION INTRAMUSCULAR; INTRAVENOUS; SUBCUTANEOUS at 10:21

## 2025-03-05 RX ADMIN — GABAPENTIN 300 MG: 300 CAPSULE ORAL at 22:00

## 2025-03-05 RX ADMIN — ROCURONIUM BROMIDE 50 MG: 50 INJECTION, SOLUTION INTRAVENOUS at 08:09

## 2025-03-05 RX ADMIN — INSULIN ASPART 3 UNITS: 100 INJECTION, SOLUTION INTRAVENOUS; SUBCUTANEOUS at 18:36

## 2025-03-05 RX ADMIN — HYDROMORPHONE HYDROCHLORIDE 0.2 MG: 0.2 INJECTION, SOLUTION INTRAMUSCULAR; INTRAVENOUS; SUBCUTANEOUS at 14:19

## 2025-03-05 RX ADMIN — FENTANYL CITRATE 100 MCG: 50 INJECTION INTRAMUSCULAR; INTRAVENOUS at 08:55

## 2025-03-05 RX ADMIN — ATORVASTATIN CALCIUM 10 MG: 10 TABLET, FILM COATED ORAL at 22:01

## 2025-03-05 ASSESSMENT — ACTIVITIES OF DAILY LIVING (ADL)
ADLS_ACUITY_SCORE: 31
ADLS_ACUITY_SCORE: 55
ADLS_ACUITY_SCORE: 32
ADLS_ACUITY_SCORE: 31

## 2025-03-05 ASSESSMENT — LIFESTYLE VARIABLES: TOBACCO_USE: 0

## 2025-03-05 ASSESSMENT — ENCOUNTER SYMPTOMS: SEIZURES: 0

## 2025-03-05 NOTE — ANESTHESIA POSTPROCEDURE EVALUATION
Patient: Geoffrey Benjamin    Procedure: Procedure(s):  cervical 4 to cervical 7 anterior cervical discectomy and fusion       Anesthesia Type:  General    Note:  Disposition: Admission   Postop Pain Control: Uneventful            Sign Out: Well controlled pain   PONV: No   Neuro/Psych: Uneventful            Sign Out: Acceptable/Baseline neuro status   Airway/Respiratory: Uneventful            Sign Out: Acceptable/Baseline resp. status   CV/Hemodynamics: Uneventful            Sign Out: Acceptable CV status; No obvious hypovolemia; No obvious fluid overload   Other NRE: NONE   DID A NON-ROUTINE EVENT OCCUR? No           Last vitals:  Vitals Value Taken Time   /66 03/05/25 1300   Temp 37.1  C (98.78  F) 03/05/25 1304   Pulse 82 03/05/25 1304   Resp 28 03/05/25 1304   SpO2 99 % 03/05/25 1304   Vitals shown include unfiled device data.    Electronically Signed By: Craig Gordon MD  March 5, 2025  1:05 PM

## 2025-03-05 NOTE — PROGRESS NOTES
Ortho Post-op Check    Subjective:  Seen in PACU.  Pain well controlled.   Notes no radicular pain. No new numbness in extremities.     Objective:   General:  Alert  Respiratory:  Breathing easy  Musculoskeletal:      Spine:   Skin: intact about neck without evidence of hematoma    Sensation:      R       L    C5:   Intact   Intact    C6:     Intact   Intact    C7:   Intact   Intact    C8:   Intact   Intact     Motor:     R L    C5: Deltoid   5  5    C6:   Biceps    5  5    C7: Triceps    5  5    C8:     5  5    T1: Intrinsics  5 5        Assessment/Plan:   79 year old male POD 0 s/p ACDF C4-7.  Recovering as expected.    - Continue current cares.        Callum Villalobos MD  Orthopaedic Spine Surgery  [facility]

## 2025-03-05 NOTE — PLAN OF CARE
Goal Outcome Evaluation:   Patient vital signs are at baseline: Yes   Patient able to ambulate as they were prior to admission or with assist devices provided by therapies during their stay: Not  oob    Patient MUST void prior to discharge:Yes     Patient able to tolerate oral intake: Yes Clear liquid    Pain has adequate pain control using Oral analgesics: Yes   Does patient have an identified :Yes     Has goal D/C date and time been discussed with patient: Pending   NS infusing at 100 mg/ hr .VSS on 2 lpm oxygen via NC. Cervical collar in placed. Blood sugar checked.Page pharmacy for med (Minocycline )no respond. Incoming  RN informed.

## 2025-03-05 NOTE — ANESTHESIA CARE TRANSFER NOTE
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    Anesthesia Type:   General     Note:    Oropharynx: oropharynx clear of all foreign objects  Level of Consciousness: drowsy  Oxygen Supplementation: face mask  Level of Supplemental Oxygen (L/min / FiO2): 6  Independent Airway: airway patency satisfactory and stable  Dentition: dentition unchanged  Vital Signs Stable: post-procedure vital signs reviewed and stable  Report to RN Given: handoff report given  Patient transferred to: PACU    Handoff Report: Identifed the Patient, Identified the Reponsible Provider, Reviewed the pertinent medical history, Discussed the surgical course, Reviewed Intra-OP anesthesia mangement and issues during anesthesia, Set expectations for post-procedure period and Allowed opportunity for questions and acknowledgement of understanding      Vitals:  Vitals Value Taken Time   /75 03/05/25 1140   Temp 37.5  C (99.5  F) 03/05/25 1141   Pulse 87 03/05/25 1140   Resp 20 03/05/25 1140   SpO2 99 % 03/05/25 1141   Vitals shown include unfiled device data.    Electronically Signed By: LUCILLE Morales CRNA  March 5, 2025  11:43 AM

## 2025-03-05 NOTE — ANESTHESIA PREPROCEDURE EVALUATION
Anesthesia Pre-Procedure Evaluation    Patient: Geoffrey Benjamin   MRN: [id number] : 1945        Procedure : Procedure(s):  cervical 4 to cervical 7 anterior cervical discectomy and fusion          Past Medical History:   Diagnosis Date    Acne rosacea     Acquired hypothyroidism     Adenomatous colon polyp     Benign neoplasm of colon     Bilateral lower extremity edema     Chronic bilateral low back pain without sciatica     Conductive hearing loss, bilateral     Coronary artery disease     Depression     off medication since    Diverticulosis     ED (erectile dysfunction) of organic origin     Essential hypertension     Gastro-oesophageal reflux disease     Gout     Graves disease     Hyperlipidemia     Hypothyroidism     Kidney stone     Metabolic syndrome     Midline low back pain without sciatica     Osteoarthritis     hands and back    Other and unspecified hyperlipidemia     PONV (postoperative nausea and vomiting)     Pure hypercholesterolemia     Type 2 diabetes mellitus without complication, without long-term current use of insulin (H)       Past Surgical History:   Procedure Laterality Date    ARTHROPLASTY KNEE Right 10/20/2020    Procedure: RIGHT TOTAL KNEE ARTHROPLASTY;  Surgeon: Adán Zafar MD;  Location: [facility] OR    ARTHROSCOPY KNEE      BILATERAL    CORONARY ANGIOGRAPHY ADULT ORDER  2006    CORONARY ARTERY BYPASS  2006    LIMA to LAD and diagonal branch arteries sequentially, SVG to PDA of RCA, and free radial artery to OM    CYSTOSCOPY      ENT SURGERY      septoplasty x2    EXTRACORPOREAL SHOCK WAVE LITHOTRIPSY, CYSTOSCOPY, INSERT STENT URETER(S), COMBINED  2013    Procedure: COMBINED EXTRACORPOREAL SHOCK WAVE LITHOTRIPSY, CYSTOSCOPY, INSERT STENT URETER(S);  CYSTOSCOPY, LEFT URETERAL STENT PLACEMENT, LEFT EXTRACORPOREAL SHOCK WAVE LITHOTRIPSY (ESWL)       EYE SURGERY Right     cataract    FOOT SURGERY      FUSION & TENDON REPAIR    HERNIA REPAIR Bilateral      LAMINECTOMY LUMBAR ONE LEVEL Bilateral 2022    Procedure: BILATERAL LUMBAR 3 TO LUMBAR 4 LAMINOTOMY;  Surgeon: Callum Villalobos MD;  Location:  OR    LASER HOLMIUM LITHOTRIPSY URETER(S), INSERT STENT, COMBINED Left 2018    Procedure: COMBINED CYSTOSCOPY, URETEROSCOPY, LASER HOLMIUM LITHOTRIPSY URETER(S), INSERT STENT;  1.  Video cystoscopy.    2.  Balloon dilation left ureter.    3.  Left ureteroscopy with holmium laser lithotripsy and stone extractions.    4.  Left double-J stent insertion (4.7 [place] x 24 cm).   5.  Exam under anesthesia.;  Surgeon: Alfonso Sinha MD;  Location:  OR    LASER HOLMIUM LITHOTRIPSY URETER(S), INSERT STENT, COMBINED Left 2018    Procedure: COMBINED CYSTOSCOPY, URETEROSCOPY, LASER HOLMIUM LITHOTRIPSY URETER(S), INSERT STENT;  1. Cystourethroscopy  2. Left flexible ureteroscopy with holmium laser lithotripsy and stone extraction  3. Left retrograde pyelography with interpretation of intraoperative fluoroscopic imaging  4. Left ureteral stent placement;  Surgeon: Romeo Cheng MD;  Location:  OR    ORTHOPEDIC SURGERY Left     subtailor fusion, tendon repair    PROSTATE BIOPSY      REMOVAL OF KIDNEY STONE      X2    SURGICAL HISTORY OF -       Septoplasty at 13    SURGICAL HISTORY OF -   2005    Ureteral stent and lithotripsy for kidney stones    SURGICAL HISTORY OF -   2013    cataract extraction OD      Allergies   Allergen Reactions    Semaglutide Nausea and Diarrhea     Acid reflux  Bloating  Foggy brain  Lethargic  Stiff neck  Thirsty    Rosuvastatin Nausea and Vomiting    Simvastatin Muscle Pain (Myalgia)    Ezetimibe Muscle Pain (Myalgia)    Statins Muscle Pain (Myalgia), Nausea and Vomiting and Difficulty breathing      Social History     Tobacco Use    Smoking status: Former     Types: Pipe, Cigarettes     Start date:      Quit date:      Years since quittin.2    Smokeless tobacco: Never   Substance Use Topics     Alcohol use: Yes     Comment: Occasion glass of wine- 1-2 x week      Wt Readings from Last 1 Encounters:   03/05/25 84.6 kg (186 lb 6.4 oz)        Anesthesia Evaluation   Pt has had prior anesthetic.     History of anesthetic complications  - PONV.      ROS/MED HX  ENT/Pulmonary:    (-) tobacco use and sleep apnea   Neurologic:    (-) no seizures and no CVA   Cardiovascular:     (+) Dyslipidemia hypertension- -  CAD -  CABG- -                                      METS/Exercise Tolerance:     Hematologic:       Musculoskeletal:   (+)  arthritis,             GI/Hepatic:     (+) GERD, Asymptomatic on medication,               (-) liver disease   Renal/Genitourinary:     (+) renal disease, type: CRI,            Endo:     (+) type I DM,    Using insulin,     thyroid problem, hypothyroidism,    Obesity,    (-) Type II DM   Psychiatric/Substance Use:       Infectious Disease:       Malignancy:       Other:            Physical Exam    Airway        Mallampati: II   TM distance: > 3 FB   Neck ROM: full   Mouth opening: > 3 cm    Respiratory Devices and Support         Dental       (+) Minor Abnormalities - some fillings, tiny chips      Cardiovascular   cardiovascular exam normal          Pulmonary   pulmonary exam normal                OUTSIDE LABS:  CBC:   Lab Results   Component Value Date    WBC 7.9 01/06/2021    WBC 8.9 11/08/2006    HGB 13.8 01/06/2021    HGB 14.1 10/21/2020    HCT 42.7 01/06/2021    HCT 32.7 (L) 11/08/2006     01/06/2021     (L) 11/08/2006     BMP:   Lab Results   Component Value Date     11/22/2022     01/06/2021    POTASSIUM 3.5 03/05/2025    POTASSIUM 3.9 11/22/2022    CHLORIDE 100 11/22/2022    CHLORIDE 109 01/06/2021    CO2 29 11/22/2022    CO2 28 01/06/2021    BUN 20 11/22/2022    BUN 12 01/06/2021    CR 0.78 11/22/2022    CR 0.65 (L) 01/06/2021     (H) 03/05/2025     (H) 11/22/2022     COAGS:   Lab Results   Component Value Date    PTT 30 11/07/2006     "INR 1.42 (H) 11/07/2006     POC:   Lab Results   Component Value Date     (H) 01/06/2021     HEPATIC:   Lab Results   Component Value Date    ALBUMIN 4.2 10/19/2015    PROTTOTAL 6.6 10/19/2015    ALT 24 01/05/2021    AST 20 01/05/2021    ALKPHOS 74 10/19/2015    BILITOTAL 0.3 10/19/2015     OTHER:   Lab Results   Component Value Date    PH 7.38 11/07/2006    A1C 7.9 (H) 10/21/2020    RULA 9.1 11/22/2022    MAG 2.0 11/08/2006       Anesthesia Plan    ASA Status:  3    NPO Status:  NPO Appropriate    Anesthesia Type: General.     - Airway: ETT   Induction: Intravenous.   Maintenance: Balanced.   Techniques and Equipment:     - Airway: Video-Laryngoscope       Consents    Anesthesia Plan(s) and associated risks, benefits, and realistic alternatives discussed. Questions answered and patient/representative(s) expressed understanding.     - Discussed:     - Discussed with:  Patient, Spouse            Postoperative Care    Pain management: Multi-modal analgesia.   PONV prophylaxis: Ondansetron (or other 5HT-3), Dexamethasone or Solumedrol, Background Propofol Infusion, Aprepitant     Comments:               Craig Gordon MD    I have reviewed the pertinent notes and labs in the chart from the past 30 days and (re)examined the patient.  Any updates or changes from those notes are reflected in this note.    Clinically Significant Risk Factors Present on Admission                 # Drug Induced Platelet Defect: home medication list includes an antiplatelet medication             # Obesity: Estimated body mass index is 30.09 kg/m  as calculated from the following:    Height as of this encounter: 1.676 m (5' 6\").    Weight as of this encounter: 84.6 kg (186 lb 6.4 oz).                "

## 2025-03-05 NOTE — OP NOTE
Orthopedic Surgery Operative Report    Patient:   Geoffrey Benjamin  MRN:   [id number]   :  1945  Facility: [facility]   Date:  25         PREOPERATIVE DIAGNOSIS:    Cervical myelopathy  Left C7 radiculopathy    POSTOPERATIVE DIAGNOSIS:    Cervical myelopathy  Left C7 radiculopathy    PROCEDURE PERFORMED:    Anterior cervical discectomy and fusion C4-7  Application of anterior spinal plate C4-7  Placement of interbody cages C4-5, C5-6, C6-7  Use of nonstructural allograft combined with local autograft    SURGEON:    Callum Villalobos MD    SURGICAL ASSISTANT:    Cheryle Ojeda PA-C     ANESTHESIA:  General    FINDINGS:    Calcified posterior disc annulus and PLL at the C5-6 level, ultimately I was able to define a plane between the dura and the PLL to fully decompress the spinal cord at this level.  Severe foraminal stenosis left C6-7.  Severe central stenosis C4-6 prior to decompression.    COMPLICATIONS:     None    SPECIMENS:    None    ESTIMATED BLOOD LOSS:  15 cc    IMPLANTS:    Nuvasive Cohere interbody cages:  16 mm x 14 mm, 7 degree, 6 mm height at C4-5 and C6-7, 7 mm height at C5-6  Nuvasive ACP 1.9H 58 mm anterior cervical plate with 3.5 mm screws except 4.0 millimeter screws in right C5 and C6  MagnetOS nonstructural graft     INDICATION FOR OPERATION:  The patient is a 79 year old male who developed myelopathic and left C7 radicular symptoms due to cervical central and foraminal stenosis.  Conservative measures were not effective in controlling his symptoms.  I discussed with him the risks, benefits, and alternatives of the above operation and he wished to proceed.    DESCRIPTION OF PROCEDURE:    The patient was met in the preoperative holding area and the operative site was confirmed and marked.  We once again reviewed the risks, benefits, and alternatives of the operation and the patient wished to proceed with the surgery.    The patient was taken back to the  operating room and induced under general anesthesia.  The patient was positioned supine with all bony prominences well padded.  The surgical site was prepped and draped in the usual standard fashion.    I radiographically localized an appropriate site for the incision with a spinal needle laid on the skin.  I then incised the skin transversely in a skin fold on the right side of the neck medial to the sternocleidomastoid and performed a standard Ng-Smith approach.  I undermined the platysma and then incised it transversely.  I then opened the fascia medial to the sternocleidomastoid and then bluntly dissected down to the anterior spine, taking great care to make sure that the esophagus and trachea were not damaged medially, and the carotid sheath remained lateral to me.  I did not encounter the recurrent laryngeal nerve in the field.  After retracting the esophagus medially, I cleared off the tissue in the midline of the spine, thereby exposing the vertebral bodies and disc space.  I placed a snap on the annulus of the exposed disc, and then radiographically confirmed the correct C5-6 level, with two independent OR personnel also noting they counted this as the correct level.  I then continued my exposure to the vertebral body above and below the operative discs C4-7.  I elevated the longus coli bilaterally and placed my Trimline retractor below them.    First at the C6-7 level, and subsequently at the C5-6 and finally the C4-5 levels, I performed the following:  I placed [place] pins into the vertebral bodies adjacent to the disc and distracted across the disc space.  I performed an annulotomy.  I then removed the disc with a combination of curettes and Kerrisons.  I used a bur along the posterior osteophytes to get down to the posterior longitudinal ligament.  I used a monica also on the endplates to contour them appropriately for my cage.   I took down the posterior longitudinal ligament and visualized dura.   I performed a foraminotomy on both sides to ensure adequate space for the nerve roots.  After the foraminotomies I could easily pass a nerve hook out the foramina without resistance, confirming adequate decompression.  At the C5-6 level there had been ossification of the PLL however after using meticulous microdissection techniques I was ultimately able to define a plane between the PLL and the dura and was able to resect this ossified PLL entirely.  At C4-5 and C5-6 there had been severe central stenosis.  Decompression, hide case conclusion the thecal sac expanded into the available space demonstrating decompression.    I next trialed for an selected a [employer] Cohere porous PEEK interbody cage.  A 14 mm x 16 mm footprint 7 degree cage was most appropriate at all levels.  Cage heights of 6 mm were most appropriate at C4-5 and C6-7, 7 mm height was most appropriate at C5-6.  This cage was selected and packed with MagnetOS nonstructural graft combined with local autograft.  The cage was placed and found to have excellent stability with friction fit against the endplates.     I contoured the anterior osteophytes on the vertebral bodies to ensure that my plate would be able to sit flush.  I then selected a 58 mm Nuvasive ACP 1.9H plate and placed it on the anterior aspect of the operative vertebrae.  I used fluoroscopy to confirm appropriate position of this, and then placed screws into the vertebral bodies.  I deployed the locking mechanisms on the plate.    I achieved final hemostasis and thoroughly irrigated the surgical site.  I then began closure.  I allow the soft tissues to stand for full 2 minutes with no retraction.  There was absolutely no bleeding over this period of time and therefore I did not place a drain.  I closed the platysma with a running 3-0 Vicryl.  Subcutaneous tissues were closed with 4-0 Vicryl loosely, and then a running 4-0 Monocryl.  A sterile dressing and Tegaderm were placed over the  incision.      The patient was allowed to emerge from anesthesia which occurred without incident and was transported to PACU in stable condition.      A surgical assistant was critical for this case to assist in retraction of the soft tissues and evacuating blood from the surgical field to facilitate a safer operation with improved visualization and less time under anesthesia.     All sponge and needle counts were correct at case conclusion.      POSTOPERATIVE PLAN:  - Internal medicine consult, appreciate assistance especially with periop glucose management in the setting of steroids being given    -Activity:    Up with assist  -Weight Bearing Status:  WBAT   -Bracing:   Hard collar to be fit by orthotics team - soft collar until it arrives.  May briefly remove for eating and hygiene if desired, and may remove to sleep.  -Antibiotics:     Ancef x24h  -Anticoagulation:   SCDs  -Steroids:   Decadron 4 mg IV q6h x24h to aid in esophageal swelling  -Pain control:    IV and PO, wean to PO as able  -Drain:     monitor and chart output  -Dressing:    Ok to shower with dressing in place, dressing ok to get wet.  -Diet:     ADAT    -Disposition:    Pending medical stability, PT, anticipate discharge around POD 2  -Follow up:     2 weeks in my clinic      Callum Villalobos MD

## 2025-03-05 NOTE — PROGRESS NOTES
S: Juan is a 79 yom that presents at the [facility] room #2411 for a cervical collar.    Dx: Cervical Spine Surgery    O: The patient was fit with a Amelia Lovell Cervical Collar. The Amelia Lovell Cervical Collar was then adjusted to the patient s size.    A: I demonstrated the donning and doffing of the collars to both the patient. Questions were invited and answered. I then the  s written documentation and placed it with the [place] replacement pad set.    P: The orthosis should be worn according to the physician s directions. Please monitor for indications of too much skin pressure. Unless otherwise directed by a physician, care should be taken to evenly divide pillows/head supports between the shoulder blades and the patient s head with the goal of maintaining the patient s head/cervical spine neutral in terms of flexion/extension. This will reduce potential chin pressures.    G: The objective/goal of the cervical collar is to reduce cervical spine motion and provide cervical stability.    Please contact the [facility] at [phone] if you have any questions. Thank you, Ravi    Note electronically signed by Ravi Dubon, Board Eligible

## 2025-03-05 NOTE — CONSULTS
[facility]  Consult Note - Hospitalist Service  Date of Admission:  03/05/2025  Consult Requested by: Dr. Callum Villalobos  Reason for Consult: DM and HTN management s/p cervical fusion     Assessment & Plan   Geoffrey Benjamin is a 79 year old male with past medical history significant for hypertension, hypercholesterolemia, DM II without complication, CAD s/p CABG (2006), hypothyroidism, TIA (11/2023), spinal stenosis s/p lumbar laminotomy (2022), BPH with LUTS, GERD, among others, admitted on 03/05/2025 following a C4-C7 anterior cervical discectomy and fusion. POD #0.      Cervical spinal stenosis and myelopathy s/p C4-C7 anterior cervical discectomy and fusion  *Procedure completed under general anesthesia by Dr. Callum Villalobos. No complications noted. 15 mL ml EBL.   - Hgb 13.9 pre-operatively.  - Post-operative hgb pending.  - Defer post-operative surgical management to primary service including pain management, disposition and therapies.    - AM Hgb and BMP.    DM II without complication  *Follows with endocrine. A1c 7.9 10/2020. On PTA Jardiance (last dose 03/01/2025 prior to surgery), glimepiride, lantus (12 units bedtime) and metformin.   - Hold PTA Jardiance, Glimepiride and Metformin. Resume all upon discharge with follow-up with Endocrine post-operatively.   - Hold Lantus with plan to resume 03/06/2025 evening.  - Medium intensity sliding scale insulin ordered.  - Glucose checks as ordered.    - Hypoglycemic protocol in place.      Hypertension   Hyperlipidemia   History of CAD s/p CABG (2006)  *Followed with Cardiology in past although has not seen Cardiology since 2021. Last echocardiogram 01/2021 with EF 65-70%. Mild chordal systolic anterior motion of mitral valve suspected but not optimally visualized. On PTA metoprolol and hydrochlorothiazide for hypertension. Additional PTA medications to be managed by primary service include atorvastatin.   *Patient denying recent history of chest  pain, shortness of breath, palpitations or lightheadedness at this time.   - Continue PTA metoprolol 03/06/2025 with hold parameters.   - Hold PTA hydrochlorothiazide pending blood pressures overnight and results of BMP AM. Resume as blood pressures allow.   - Start PRN IV hydralazine 10 mg every 4 hours for SBP GREATER THAN 180.       The patient's care was discussed with the Attending Physician, Dr. Danielson .    Mirtha Paulino PA-C  Hospitalist Service  Securely message with Vocera (more info)  Text page via [facility] Paging/Directory   ______________________________________________________________________    Chief Complaint   S/p C4-C7 anterior cervical discectomy and fusion    History is obtained from the patient and chart review    History of Present Illness   Geoffrey Benjamin is a 79 year old male with past medical history significant for hypertension, hypercholesterolemia, DM II without complication, CAD s/p CABG (2006), hypothyroidism, TIA (11/2023), spinal stenosis s/p lumbar laminotomy (2022), BPH with LUTS, GERD, among others, admitted on 03/05/2025 following a C4-C7 anterior cervical discectomy and fusion. POD #0.     Procedure completed under general anesthesia by Dr. Callum Villalobos. No complications noted. 15 mL ml EBL. I evaluated the patient post-operatively. He is awake, alert, in no distress. He is laying in bed upon my examination in no acute distress. Patient denies CP, SOB, palpitations, PONV. Patient states he is feeling well and reports his pain is being well managed.     Past Medical History    Past Medical History:   Diagnosis Date    Acne rosacea     Acquired hypothyroidism     Adenomatous colon polyp     Benign neoplasm of colon     Bilateral lower extremity edema     Chronic bilateral low back pain without sciatica     Conductive hearing loss, bilateral     Coronary artery disease     Depression 1989    off medication since    Diverticulosis     ED (erectile dysfunction) of organic  origin     Essential hypertension     Gastro-oesophageal reflux disease     Gout     Graves disease     Hyperlipidemia     Hypothyroidism     Kidney stone     Metabolic syndrome     Midline low back pain without sciatica     Osteoarthritis     hands and back    Other and unspecified hyperlipidemia     PONV (postoperative nausea and vomiting)     Pure hypercholesterolemia     Type 2 diabetes mellitus without complication, without long-term current use of insulin (H)        Past Surgical History   Past Surgical History:   Procedure Laterality Date    ARTHROPLASTY KNEE Right 10/20/2020    Procedure: RIGHT TOTAL KNEE ARTHROPLASTY;  Surgeon: Adán Zafar MD;  Location:  OR    ARTHROSCOPY KNEE      BILATERAL    CORONARY ANGIOGRAPHY ADULT ORDER  11/2006    CORONARY ARTERY BYPASS  11/07/2006    LIMA to LAD and diagonal branch arteries sequentially, SVG to PDA of RCA, and free radial artery to OM    CYSTOSCOPY      ENT SURGERY      septoplasty x2    EXTRACORPOREAL SHOCK WAVE LITHOTRIPSY, CYSTOSCOPY, INSERT STENT URETER(S), COMBINED  08/13/2013    Procedure: COMBINED EXTRACORPOREAL SHOCK WAVE LITHOTRIPSY, CYSTOSCOPY, INSERT STENT URETER(S);  CYSTOSCOPY, LEFT URETERAL STENT PLACEMENT, LEFT EXTRACORPOREAL SHOCK WAVE LITHOTRIPSY (ESWL)       EYE SURGERY Right     cataract    FOOT SURGERY      FUSION & TENDON REPAIR    HERNIA REPAIR Bilateral     LAMINECTOMY LUMBAR ONE LEVEL Bilateral 11/21/2022    Procedure: BILATERAL LUMBAR 3 TO LUMBAR 4 LAMINOTOMY;  Surgeon: Callum Villalobos MD;  Location:  OR    LASER HOLMIUM LITHOTRIPSY URETER(S), INSERT STENT, COMBINED Left 05/08/2018    Procedure: COMBINED CYSTOSCOPY, URETEROSCOPY, LASER HOLMIUM LITHOTRIPSY URETER(S), INSERT STENT;  1.  Video cystoscopy.    2.  Balloon dilation left ureter.    3.  Left ureteroscopy with holmium laser lithotripsy and stone extractions.    4.  Left double-J stent insertion (4.7 Bengali x 24 cm).   5.  Exam under anesthesia.;  Surgeon: Alfonso Sinha,  MD;  Location: [facility] OR    LASER HOLMIUM LITHOTRIPSY URETER(S), INSERT STENT, COMBINED Left 06/05/2018    Procedure: COMBINED CYSTOSCOPY, URETEROSCOPY, LASER HOLMIUM LITHOTRIPSY URETER(S), INSERT STENT;  1. Cystourethroscopy  2. Left flexible ureteroscopy with holmium laser lithotripsy and stone extraction  3. Left retrograde pyelography with interpretation of intraoperative fluoroscopic imaging  4. Left ureteral stent placement;  Surgeon: Romeo Cheng MD;  Location: [facility] OR    ORTHOPEDIC SURGERY Left     subtailor fusion, tendon repair    PROSTATE BIOPSY      REMOVAL OF KIDNEY STONE      X2    SURGICAL HISTORY OF -   1988    Septoplasty at 13    SURGICAL HISTORY OF -   01/2005    Ureteral stent and lithotripsy for kidney stones    SURGICAL HISTORY OF -   08/2013    cataract extraction OD       Medications   I have reviewed this patient's current medications       Allergies   Allergies   Allergen Reactions    Rosuvastatin Nausea and Vomiting    Simvastatin Muscle Pain (Myalgia)    Ezetimibe Muscle Pain (Myalgia)    Semaglutide Nausea and Diarrhea     Acid reflux  Bloating  Foggy brain  Lethargic  Stiff neck  Thirsty    Statins Muscle Pain (Myalgia), Nausea and Vomiting and Difficulty breathing        Physical Exam   Vital Signs: Temp: 98.1  F (36.7  C) Temp src: Oral BP: 121/74 Pulse: 87   Resp: 18 SpO2: 96 % O2 Device: None (Room air) Oxygen Delivery: 2 LPM  Weight: 186 lbs 6.4 oz    GENERAL:  Pleasant, cooperative, alert. Patient laying in bed comfortably upon examination in no acute distress.   HEENT: Normocephalic, atraumatic.  PERRL. MMM. Neck supple. No JVD.    PULMONOLOGY: Clear to auscultation bilaterally.  CARDIAC: Regular rate and rhythm.   ABDOMEN: Soft, nontender non distended. BS+.   MUSCULOSKELETAL:  Moving x 4 spontaneously with CMS intact x4.     NEURO: Alert and oriented x3. Nonfocal exam.    Medical Decision Making       38 MINUTES SPENT BY ME on the date of service doing chart review,  history, exam, documentation & further activities per the note.      Data   ------------------------- PAST 24 HR DATA REVIEWED -----------------------------------------------    I have personally reviewed the following data over the past 24 hrs:    N/A  \   N/A   / N/A     N/A N/A N/A /  175 (H)   3.5 N/A N/A \       Imaging results reviewed over the past 24 hrs:   Recent Results (from the past 24 hours)   XR Surgery MICHAEL Fluoro Less Than 5 Min w Stills    Narrative    This exam was marked as non-reportable because it will not be read by a   radiologist or a [place] non-radiologist provider.

## 2025-03-05 NOTE — ANESTHESIA PROCEDURE NOTES
Airway       Patient location during procedure: OR       Procedure Start/Stop Times: 3/5/2025 8:12 AM  Staff -        Performed By: CRNAIndications and Patient Condition       Indications for airway management: jessie-procedural       Induction type:intravenous       Mask difficulty assessment: 2 - vent by mask + OA or adjuvant +/- NMBA    Final Airway Details       Final airway type: endotracheal airway       Successful airway: ETT - single  Endotracheal Airway Details        ETT size (mm): 7.5       Cuffed: yes       Successful intubation technique: video laryngoscopy       VL Blade Size: Glidescope 3       Grade View of Cords: 1       Adjucts: stylet       Position: Left       Measured from: gums/teeth       Secured at (cm): 24       Bite block used: None    Post intubation assessment        Placement verified by: capnometry, equal breath sounds and chest rise        Number of attempts at approach: 1       Number of other approaches attempted: 0       Secured with: tape       Ease of procedure: easy       Dentition: Intact    Medication(s) Administered   Medication Administration Time: 3/5/2025 8:12 AM

## 2025-03-06 ENCOUNTER — APPOINTMENT (OUTPATIENT)
Dept: OCCUPATIONAL THERAPY | Facility: CLINIC | Age: 80
DRG: 472 | End: 2025-03-06
Attending: ORTHOPAEDIC SURGERY
Payer: MEDICARE

## 2025-03-06 ENCOUNTER — APPOINTMENT (OUTPATIENT)
Dept: GENERAL RADIOLOGY | Facility: CLINIC | Age: 80
DRG: 472 | End: 2025-03-06
Attending: STUDENT IN AN ORGANIZED HEALTH CARE EDUCATION/TRAINING PROGRAM
Payer: MEDICARE

## 2025-03-06 VITALS
WEIGHT: 186.4 LBS | SYSTOLIC BLOOD PRESSURE: 123 MMHG | BODY MASS INDEX: 29.96 KG/M2 | HEIGHT: 66 IN | TEMPERATURE: 98.1 F | HEART RATE: 99 BPM | OXYGEN SATURATION: 97 % | RESPIRATION RATE: 18 BRPM | DIASTOLIC BLOOD PRESSURE: 68 MMHG

## 2025-03-06 LAB
ANION GAP SERPL CALCULATED.3IONS-SCNC: 14 MMOL/L (ref 7–15)
BUN SERPL-MCNC: 15.5 MG/DL (ref 8–23)
CALCIUM SERPL-MCNC: 9.1 MG/DL (ref 8.8–10.4)
CHLORIDE SERPL-SCNC: 101 MMOL/L (ref 98–107)
CREAT SERPL-MCNC: 0.8 MG/DL (ref 0.67–1.17)
EGFRCR SERPLBLD CKD-EPI 2021: 90 ML/MIN/1.73M2
GLUCOSE BLDC GLUCOMTR-MCNC: 179 MG/DL (ref 70–99)
GLUCOSE BLDC GLUCOMTR-MCNC: 302 MG/DL (ref 70–99)
GLUCOSE BLDC GLUCOMTR-MCNC: 346 MG/DL (ref 70–99)
GLUCOSE SERPL-MCNC: 222 MG/DL (ref 70–99)
HCO3 SERPL-SCNC: 23 MMOL/L (ref 22–29)
HGB BLD-MCNC: 13.8 G/DL (ref 13.3–17.7)
POTASSIUM SERPL-SCNC: 4.2 MMOL/L (ref 3.4–5.3)
SODIUM SERPL-SCNC: 138 MMOL/L (ref 135–145)

## 2025-03-06 PROCEDURE — 250N000013 HC RX MED GY IP 250 OP 250 PS 637: Performed by: STUDENT IN AN ORGANIZED HEALTH CARE EDUCATION/TRAINING PROGRAM

## 2025-03-06 PROCEDURE — 120N000001 HC R&B MED SURG/OB

## 2025-03-06 PROCEDURE — 80048 BASIC METABOLIC PNL TOTAL CA: CPT | Performed by: STUDENT IN AN ORGANIZED HEALTH CARE EDUCATION/TRAINING PROGRAM

## 2025-03-06 PROCEDURE — 999N000065 XR CERVICAL SPINE 2/3 VIEWS

## 2025-03-06 PROCEDURE — 97535 SELF CARE MNGMENT TRAINING: CPT | Mod: GO | Performed by: OCCUPATIONAL THERAPIST

## 2025-03-06 PROCEDURE — 99232 SBSQ HOSP IP/OBS MODERATE 35: CPT | Performed by: INTERNAL MEDICINE

## 2025-03-06 PROCEDURE — 250N000012 HC RX MED GY IP 250 OP 636 PS 637: Performed by: INTERNAL MEDICINE

## 2025-03-06 PROCEDURE — 258N000003 HC RX IP 258 OP 636: Performed by: STUDENT IN AN ORGANIZED HEALTH CARE EDUCATION/TRAINING PROGRAM

## 2025-03-06 PROCEDURE — 250N000013 HC RX MED GY IP 250 OP 250 PS 637

## 2025-03-06 PROCEDURE — 97166 OT EVAL MOD COMPLEX 45 MIN: CPT | Mod: GO | Performed by: OCCUPATIONAL THERAPIST

## 2025-03-06 PROCEDURE — 97530 THERAPEUTIC ACTIVITIES: CPT | Mod: GO | Performed by: OCCUPATIONAL THERAPIST

## 2025-03-06 PROCEDURE — 36415 COLL VENOUS BLD VENIPUNCTURE: CPT | Performed by: STUDENT IN AN ORGANIZED HEALTH CARE EDUCATION/TRAINING PROGRAM

## 2025-03-06 PROCEDURE — 250N000011 HC RX IP 250 OP 636: Performed by: STUDENT IN AN ORGANIZED HEALTH CARE EDUCATION/TRAINING PROGRAM

## 2025-03-06 PROCEDURE — 85018 HEMOGLOBIN: CPT | Performed by: STUDENT IN AN ORGANIZED HEALTH CARE EDUCATION/TRAINING PROGRAM

## 2025-03-06 RX ADMIN — INSULIN ASPART 3 UNITS: 100 INJECTION, SOLUTION INTRAVENOUS; SUBCUTANEOUS at 18:21

## 2025-03-06 RX ADMIN — GABAPENTIN 300 MG: 300 CAPSULE ORAL at 22:37

## 2025-03-06 RX ADMIN — SENNOSIDES AND DOCUSATE SODIUM 1 TABLET: 50; 8.6 TABLET ORAL at 09:53

## 2025-03-06 RX ADMIN — INSULIN ASPART 1 UNITS: 100 INJECTION, SOLUTION INTRAVENOUS; SUBCUTANEOUS at 09:33

## 2025-03-06 RX ADMIN — ACETAMINOPHEN 975 MG: 325 TABLET, FILM COATED ORAL at 18:24

## 2025-03-06 RX ADMIN — SODIUM CHLORIDE: 0.9 INJECTION, SOLUTION INTRAVENOUS at 05:40

## 2025-03-06 RX ADMIN — DEXAMETHASONE SODIUM PHOSPHATE 4 MG: 4 INJECTION, SOLUTION INTRAMUSCULAR; INTRAVENOUS at 09:35

## 2025-03-06 RX ADMIN — MINOCYCLINE HYDROCHLORIDE 100 MG: 100 CAPSULE ORAL at 09:27

## 2025-03-06 RX ADMIN — GABAPENTIN 300 MG: 300 CAPSULE ORAL at 09:28

## 2025-03-06 RX ADMIN — GABAPENTIN 300 MG: 300 CAPSULE ORAL at 18:23

## 2025-03-06 RX ADMIN — INSULIN ASPART 4 UNITS: 100 INJECTION, SOLUTION INTRAVENOUS; SUBCUTANEOUS at 14:09

## 2025-03-06 RX ADMIN — INSULIN GLARGINE 21 UNITS: 100 INJECTION, SOLUTION SUBCUTANEOUS at 22:35

## 2025-03-06 RX ADMIN — ACETAMINOPHEN 975 MG: 325 TABLET, FILM COATED ORAL at 09:27

## 2025-03-06 RX ADMIN — PANTOPRAZOLE SODIUM 40 MG: 40 TABLET, DELAYED RELEASE ORAL at 09:27

## 2025-03-06 RX ADMIN — POLYETHYLENE GLYCOL 3350 17 G: 17 POWDER, FOR SOLUTION ORAL at 09:33

## 2025-03-06 RX ADMIN — ATORVASTATIN CALCIUM 10 MG: 10 TABLET, FILM COATED ORAL at 22:37

## 2025-03-06 RX ADMIN — METOPROLOL SUCCINATE 50 MG: 50 TABLET, FILM COATED, EXTENDED RELEASE ORAL at 09:28

## 2025-03-06 RX ADMIN — LEVOTHYROXINE SODIUM 112 MCG: 112 TABLET ORAL at 06:41

## 2025-03-06 RX ADMIN — DEXAMETHASONE SODIUM PHOSPHATE 4 MG: 4 INJECTION, SOLUTION INTRAMUSCULAR; INTRAVENOUS at 05:37

## 2025-03-06 RX ADMIN — TAMSULOSIN HYDROCHLORIDE 0.4 MG: 0.4 CAPSULE ORAL at 09:53

## 2025-03-06 ASSESSMENT — ACTIVITIES OF DAILY LIVING (ADL)
ADLS_ACUITY_SCORE: 36
IADL_COMMENTS: SPOUSE CAN COMPLETE AS NEEDED
ADLS_ACUITY_SCORE: 36

## 2025-03-06 NOTE — CARE PLAN
PT: Orders received. Chart reviewed and discussed with care team.  PT not indicated due to not acute care needs identified.  Defer discharge recommendations to OT.  Will complete orders.

## 2025-03-06 NOTE — CARE PLAN
Patient vital signs are at baseline: yes    Patient able to ambulate as they were prior to admission or with assist devices provided by therapies during their stay: 1 assist gbw    Patient MUST void prior to discharge:Yes  bathroom     Patient able to tolerate oral intake: Yes Mod carb   Pain has adequate pain control using Oral analgesics. Denied   Does patient have an identified :Pending   Has goal D/C date and time been discussed with patient:Pending discharged 3/07/25.

## 2025-03-06 NOTE — PROGRESS NOTES
[facility]    Hospitalist Progress Note    Assessment & Plan   Geoffrey Benjamin is a 79 year old male with past medical history significant for hypertension, hypercholesterolemia, DM II without complication, CAD s/p CABG (2006), hypothyroidism, TIA (11/2023), spinal stenosis s/p lumbar laminotomy (2022), BPH with LUTS, GERD, among others, admitted on 03/05/2025 following a C4-C7 anterior cervical discectomy and fusion. POD #0.      Cervical spinal stenosis and myelopathy s/p C4-C7 anterior cervical discectomy and fusion  *Procedure completed under general anesthesia by Dr. Callum Villalobos. No complications noted. 15 mL ml EBL.   - Hgb 13.9 pre-operatively.  Postoperative hemoglobin stable  - Post-operative hgb pending.  - Defer post-operative surgical management to primary service including pain management, disposition and therapies.         DM II without complication  *Follows with endocrine. A1c 7.9 10/2020. On PTA Jardiance (last dose 03/01/2025 prior to surgery), glimepiride, lantus (12 units bedtime) and metformin.   - Hold PTA Jardiance, Glimepiride and Metformin. Resume all upon discharge with follow-up with Endocrine post-operatively.   -Blood sugars elevated, will restart Lantus 3/6  - Medium intensity sliding scale insulin ordered.  - Glucose checks as ordered.    - Hypoglycemic protocol in place.       Hypertension   Hyperlipidemia   History of CAD s/p CABG (2006)  *Followed with Cardiology in past although has not seen Cardiology since 2021. Last echocardiogram 01/2021 with EF 65-70%. Mild chordal systolic anterior motion of mitral valve suspected but not optimally visualized. On PTA metoprolol and hydrochlorothiazide for hypertension. Additional PTA medications to be managed by primary service include atorvastatin.   *Patient denying recent history of chest pain, shortness of breath, palpitations or lightheadedness at this time.   - Continue PTA metoprolol  with hold parameters.   -  "Hold PTA hydrochlorothiazide , will restart on discharge.  Blood pressures are still on the lower side  - Start PRN IV hydralazine 10 mg every 4 hours for SBP GREATER THAN 180.      Diet :Regular Diet Adult  DVT Prophylaxis: Defer to primary service  Code Status: Full Code     40 MINUTES SPENT BY ME on the date of service doing chart review, history, exam, documentation & further activities per the note.  Medically Ready for Discharge: Anticipated Tomorrow    Clinically Significant Risk Factors                              # Obesity: Estimated body mass index is 30.09 kg/m  as calculated from the following:    Height as of this encounter: 1.676 m (5' 6\").    Weight as of this encounter: 84.6 kg (186 lb 6.4 oz)., PRESENT ON ADMISSION            Bernie Graves MD  Text Page   (7am to 6pm)    Interval History   Resting, pain controlled.    -Data reviewed today: I reviewed all new labs and imaging results over the last 24 hours.   Physical Exam     Vital Signs with Ranges  Temp:  [97.9  F (36.6  C)-98.1  F (36.7  C)] 98.1  F (36.7  C)  Pulse:  [78-87] 80  Resp:  [16-18] 18  BP: (107-121)/(58-74) 121/58  SpO2:  [95 %-96 %] 95 %  I/O last 3 completed shifts:  In: 1600 [P.O.:200; I.V.:1400]  Out: 115 [Urine:100; Blood:15]    Constitutional: Awake, alert, cooperative, no apparent distress, neck collar+  Respiratory: Clear to auscultation bilaterally, no crackles or wheezing  Cardiovascular: Regular rate and rhythm, normal S1 and S2, and no murmur noted  GI: Normal bowel sounds, soft, non-distended, non-tender  Skin/Integumen: No rashes, no cyanosis, no edema  Neuro : moving all 4 extremities, no focal deficit noted     Medications   Current Facility-Administered Medications   Medication Dose Route Frequency Provider Last Rate Last Admin    sodium chloride 0.9 % infusion   Intravenous Continuous Cheryle Ojeda PA-C   Stopped at 03/06/25 1100     Current Facility-Administered Medications   Medication Dose Route " Frequency Provider Last Rate Last Admin    acetaminophen (TYLENOL) tablet 975 mg  975 mg Oral Q8H Cheryle Ojeda PA-C   975 mg at 03/06/25 0927    atorvastatin (LIPITOR) tablet 10 mg  10 mg Oral At Bedtime Cheryle Ojeda PA-C   10 mg at 03/05/25 2201    gabapentin (NEURONTIN) capsule 300 mg  300 mg Oral TID Cheryle Ojeda PA-C   300 mg at 03/06/25 0928    [Held by provider] hydrochlorothiazide (HYDRODIURIL) tablet 25 mg  25 mg Oral Daily Mirtha Paulino PA-C        insulin aspart (NovoLOG) injection (RAPID ACTING)  1-7 Units Subcutaneous TID  Mirtha Paulino PA-C   4 Units at 03/06/25 1409    insulin aspart (NovoLOG) injection (RAPID ACTING)  1-5 Units Subcutaneous At Bedtime Mirtha Paulino PA-C   1 Units at 03/05/25 2337    levothyroxine (SYNTHROID/LEVOTHROID) tablet 112 mcg  112 mcg Oral QAM  Cheryle Ojeda PA-C   112 mcg at 03/06/25 0641    metoprolol succinate ER (TOPROL XL) 24 hr tablet 50 mg  50 mg Oral Daily Mirtha Paulino PA-C   50 mg at 03/06/25 0928    minocycline (MINOCIN) capsule 100 mg  100 mg Oral Daily Cheryle Ojeda PA-C   100 mg at 03/06/25 0927    pantoprazole (PROTONIX) EC tablet 40 mg  40 mg Oral Daily Cheryle Ojeda PA-C   40 mg at 03/06/25 0927    polyethylene glycol (MIRALAX) Packet 17 g  17 g Oral Daily Cheryle Ojeda PA-C   17 g at 03/06/25 0933    senna-docusate (SENOKOT-S/PERICOLACE) 8.6-50 MG per tablet 1 tablet  1 tablet Oral BID Cheryle Ojeda PA-C   1 tablet at 03/06/25 0953    sodium chloride (PF) 0.9% PF flush 3 mL  3 mL Intracatheter Q8H Cheryle Ojeda PA-C   3 mL at 03/06/25 0934    tamsulosin (FLOMAX) capsule 0.4 mg  0.4 mg Oral Daily Cheryle Ojeda PA-C   0.4 mg at 03/06/25 0953       Data   Recent Labs   Lab 03/06/25  1212 03/06/25  1039 03/06/25  0207 03/05/25  1146 03/05/25  0624   HGB  --  13.8  --   --   --    NA  --  138  --   --   --    POTASSIUM  --  4.2  --    --  3.5   CHLORIDE  --  101  --   --   --    CO2  --  23  --   --   --    BUN  --  15.5  --   --   --    CR  --  0.80  --   --   --    ANIONGAP  --  14  --   --   --    RULA  --  9.1  --   --   --    * 222* 179*   < > 175*    < > = values in this interval not displayed.     Recent Labs   Lab 03/06/25  1212 03/06/25  1039 03/06/25  0207 03/05/25  2258 03/05/25  1738   * 222* 179* 227* 289*       Imaging:   Recent Results (from the past 24 hours)   XR Cervical Spine 2/3 Views    Narrative    EXAM: XR CERVICAL SPINE 2/3 VIEWS  LOCATION: [facility]  DATE: 3/6/2025    INDICATION: S p cervical fusion  COMPARISON: None.      Impression    IMPRESSION: C4-C7 interbody, anterior plate and screw instrumented fusion. Hardware appears in satisfactory position. Preserved vertebral body heights and alignment. Postsurgical prominence of the prevertebral soft tissues.

## 2025-03-06 NOTE — PROGRESS NOTES
"Ortho Progress Note     Subjective:  No acute overnight events. Doing well this morning. Reports resolution of pre-operative sx, most notably LUE and LLE pain. Mild residual tingling L hand. No new or worsening radicular pain, paresthesias, weakness. Has tolerated a clear liquid diet well and hoping to advance for breakfast. Has not been OOB.     Pain with urination, expected in the setting of traumatic toro placement. Patient feels this is gradually improving. Denies active bleeding.     Patient states baseline BP runs between 105-110. Denies any dizziness or lightheadedness.     Objective:  /64 (BP Location: Right arm, Patient Position: Supine, Cuff Size: Adult Regular)   Pulse 82   Temp 97.9  F (36.6  C) (Oral)   Resp 16   Ht 1.676 m (5' 6\")   Wt 84.6 kg (186 lb 6.4 oz)   SpO2 96%   BMI 30.09 kg/m    Gen: alert and appropriately interactive, no acute distress  CV: visible skin appears well-perfused, extremities warm to touch.   Resp: breathing equal and non-labored, no wheezing  MSK:    .    Spine:   Skin: Surgical dressing CDI without evidence of infection    Sensation:      R       L    C5:   Intact   Intact    C6:     Intact   Intact    C7:   Intact   Intact    C8:   Intact   Intact     Motor:     R L    C5: Deltoid   5  5    C6:   Biceps    5  5    C7: Triceps    5  5    C8:     5  5    T1: Intrinsics  5 5        Hemoglobin   Date Value Ref Range Status   01/06/2021 13.8 13.3 - 17.7 g/dL Final   10/21/2020 14.1 13.3 - 17.7 g/dL Final     Assessment/Plan:  POD 1 s/p C4-7 ACDF performed 3/05. Recovering as expected. Diet increased to regular this morning.    Pain with urination, expected in the setting of traumatic toro placement. Patient feels this is gradually improving. Denies active bleeding. Medicine on board. We appreciate the medical management directed by our hospitalist colleagues. Will consider urology consult if needed.     Goals of the day: mobilization/evaluation with PT/OT, " monitor independent void, pain control, xrays when able.       -Activity:                                       Up with assist  -Weight Bearing Status:            WBAT   -Bracing:                                      Hard collar. May briefly remove for eating and hygiene if desired, and may remove to sleep.  -Antibiotics:                                  Ancef x24h  -Anticoagulation:                        SCDs  -Steroids:                                      Decadron 4 mg IV q6h x24h to aid in esophageal swelling  -Pain control:                               IV and PO, wean to PO as able  -Drain:                                          monitor and chart output  -Dressing:                                     Ok to shower with dressing in place, dressing ok to get wet.  -Diet:                                              ADAT     -Disposition:                                 Pending medical stability, PT, anticipate discharge later today vs tomorrow   -Follow up:                                   2 weeks in [facility]    Cheryle Ojeda PA-C  Orthopedic Spine Surgery  [facility]

## 2025-03-06 NOTE — PLAN OF CARE
Goal Outcome Evaluation:       Patient vital signs are at baseline: Yes VSS on 1L of O2  Patient able to ambulate as they were prior to admission or with assist devices provided by therapies during their stay:  No,  Reason:  Not  oob    Patient MUST void prior to discharge:  Yes, Uses a urinal @ bedside.  Patient able to tolerate oral intake:  Yes, on clear liquid diet.  Pain has adequate pain control using Oral analgesics:  Yes, Tylenol and Oxy  Does patient have an identified :  Yes  Has goal D/C date and time been discussed with patient: TBD      POD#1 of C4 to C7 fusion, A&Ox4, IV NS infusing at 100 mg/ hr . Cervical collar in placed. Blood sugar checked. Discharge TBD

## 2025-03-06 NOTE — PROGRESS NOTES
03/06/25 1003   Appointment Info   Signing Clinician's Name / Credentials (OT) Sherrie Montgomery OTR/L   Living Environment   People in Home spouse   Current Living Arrangements other (see comments)  ([facility])   Home Accessibility stairs to enter home;stairs within home   Living Environment Comments 3 PRAFUL, stair glide to UL; spuse can assist at dc   Self-Care   Usual Activity Tolerance good   Current Activity Tolerance moderate   Regular Exercise Yes   Activity/Exercise Type   (yoga)   Exercise Amount/Frequency 2 times/wk   Equipment Currently Used at Home grab bar, toilet;grab bar, tub/shower;shower chair  (pt owns canes, walkers)   Fall history within last six months yes   Number of times patient has fallen within last six months 1   Activity/Exercise/Self-Care Comment indep with ADLs and mobility at baseline, uses a SEC prn   Instrumental Activities of Daily Living (IADL)   IADL Comments spouse can complete as needed   General Information   Onset of Illness/Injury or Date of Surgery 03/05/25   Referring Physician Cheryle Ojeda, PAFarrahC   Patient/Family Therapy Goal Statement (OT) home   Additional Occupational Profile Info/Pertinent History of Current Problem POD 1 s/p C4-7 ACDF performed 3/05.   Existing Precautions/Restrictions fall;spinal   Left Upper Extremity (Weight-bearing Status) partial weight-bearing (PWB)  (5#)   Right Upper Extremity (Weight-bearing Status) partial weight-bearing (PWB)  (5#)   General Observations and Info brace on at all times, may remove briefly for eating, hygienes, and may remove for sleeping   Cognitive Status Examination   Affect/Mental Status (Cognitive) WNL   Follows Commands follows two-step commands;over 90% accuracy   Visual Perception   Visual Impairment/Limitations corrective lenses full-time   Pain Assessment   Patient Currently in Pain Yes, see Vital Sign flowsheet   Posture   Posture forward head position;protracted shoulders   Range of Motion Comprehensive    Comment, General Range of Motion WFL, limited by surgical precautions   Strength Comprehensive (MMT)   Comment, General Manual Muscle Testing (MMT) Assessment WFL, limited by surgical pain   Coordination   Upper Extremity Coordination No deficits were identified   Bed Mobility   Bed Mobility supine-sit;sit-supine   Supine-Sit Howell (Bed Mobility) contact guard;verbal cues   Sit-Supine [place] (Bed Mobility) contact guard;verbal cues   Transfers   Transfers sit-stand transfer;toilet transfer;shower transfer   Sit-Stand Transfer   Sit-Stand [place]ell (Transfers) contact guard;verbal cues   Shower Transfer   Howell Level (Shower Transfer) minimum assist (75% patient effort);verbal cues   Toilet Transfer   Howell Level (Toilet Transfer) minimum assist (75% patient effort);verbal cues   Balance   Balance Comments good seated; mildly unsteady ambulating in room without AD   Activities of Daily Living   BADL Assessment/Intervention upper body dressing;lower body dressing;grooming;toileting   Upper Body Dressing Assessment/Training   Howell Level (Upper Body Dressing) minimum assist (75% patient effort)   Lower Body Dressing Assessment/Training   Howell Level (Lower Body Dressing) minimum assist (75% patient effort)   Grooming Assessment/Training   Howell Level (Grooming) minimum assist (75% patient effort)   Toileting   Howell Level (Toileting) minimum assist (75% patient effort)   Clinical Impression   Criteria for Skilled Therapeutic Interventions Met (OT) Yes, treatment indicated   OT Diagnosis impaired ADLs   OT Problem List-Impairments impacting ADL problems related to;range of motion (ROM);strength;pain;post-surgical precautions   Assessment of Occupational Performance 3-5 Performance Deficits   Identified Performance Deficits dressing, toileting, bathing, stairs, functional mobility   Planned Therapy Interventions (OT) ADL retraining;bed mobility training;orthotic  fitting/training;transfer training  (stairs)   Clinical Decision Making Complexity (OT) detailed assessment/moderate complexity   Risk & Benefits of therapy have been explained evaluation/treatment results reviewed;patient   OT Total Evaluation Time   OT Eval, Moderate Complexity Minutes (27232) 8   OT Goals   Therapy Frequency (OT) One time eval and treatment   OT Predicted Duration/Target Date for Goal Attainment 03/06/25   OT Goals Hygiene/Grooming;Upper Body Dressing;Lower Body Dressing;Bed Mobility;Transfers;Toilet Transfer/Toileting;OT Goal 1;OT Goal 2   OT: Hygiene/Grooming modified independent;within precautions   OT: Upper Body Dressing Supervision/stand-by assist;including orthotic;within precautions   OT: Lower Body Dressing Modified independent;within precautions   OT: Bed Mobility Modified independent;supine to/from sitting;within precautions   OT: Transfer Supervision/stand-by assist;with assistive device;within precautions   OT: Toilet Transfer/Toileting Modified independent;toilet transfer;cleaning and garment management;within precautions;using adaptive equipment   OT: Goal 1 SBA for stairs x3   OT: Goal 2 SBA with functional mobility for household distances, using AD prn   Self-Care/Home Management   Self-Care/Home Mgmt/ADL, Compensatory, Meal Prep Minutes (73083) 25   Symptoms Noted During/After Treatment (Meal Preparation/Planning Training) none   Treatment Detail/Skilled Intervention Ed pt in spinal precautions. Ed pt in UB dressing tech, pt doff/dons collar standing in front of mirror Mod I following; reviewed changing pads and washing. Pt doff/dons B slipper socks, dons briefs and socks/shoes Mod I following ed, usinig fig-4 tech.  Ed pt in backing up to toilet by feel instead of twisting to look, cues for hand placement, pt completes Mod I following.  Cued pt to simulate jessie-cares, pt able to complete within prec.  Ed pt in using 2 cups for oral cares, and in option of removing collar for  shaving. Pt asking about leaning over sink for hair care, ed in not flexing for this, and pt states wife could use a wet washcloth instead.  Ed pt in not driving, pt endorses not for 6 weeks.   Therapeutic Activities   Therapeutic Activity Minutes (46222) 15   Symptoms noted during/after treatment fatigue   Treatment Detail/Skilled Intervention BP taken seated, WFL, see VSFS. Pt ambulates in hallway with Leif, unsteady, so switched to using shoes and 2WW, pt now SBA for hallway mobility, ed pt in using walker initially at dc, pt endorses.  Pt completes x4 stairs Mod I using railing with BUE, step-to pattern.  Pt walks ~350. Ed pt in logroll and supine <> sit, pt completes Mod I following to flat bed.  Pt left seated in chair, lab present.   OT Discharge Planning   OT Plan dc   OT Discharge Recommendation (DC Rec) home with assist   OT Rationale for DC Rec Pt progressing well, anticipate he will dc to home with assist for heavy home chores once medically stable.   OT Brief overview of current status SBA w/2WW in hallway. Goals of therapy will be to address safe mobility and make recs for d/c to next level of care. Pt and RN will continue to follow all falls risk precautions as documented by RN staff while hospitalized.   OT Total Distance Amb During Session (feet) 370   Total Session Time   Timed Code Treatment Minutes 40   Total Session Time (sum of timed and untimed services) 48

## 2025-03-07 VITALS
RESPIRATION RATE: 16 BRPM | DIASTOLIC BLOOD PRESSURE: 77 MMHG | HEART RATE: 93 BPM | HEIGHT: 66 IN | SYSTOLIC BLOOD PRESSURE: 135 MMHG | TEMPERATURE: 97.8 F | WEIGHT: 186.4 LBS | BODY MASS INDEX: 29.96 KG/M2 | OXYGEN SATURATION: 94 %

## 2025-03-07 LAB
GLUCOSE BLDC GLUCOMTR-MCNC: 204 MG/DL (ref 70–99)
GLUCOSE BLDC GLUCOMTR-MCNC: 236 MG/DL (ref 70–99)
GLUCOSE BLDC GLUCOMTR-MCNC: 269 MG/DL (ref 70–99)

## 2025-03-07 PROCEDURE — 250N000013 HC RX MED GY IP 250 OP 250 PS 637: Performed by: INTERNAL MEDICINE

## 2025-03-07 PROCEDURE — 250N000013 HC RX MED GY IP 250 OP 250 PS 637

## 2025-03-07 PROCEDURE — 250N000013 HC RX MED GY IP 250 OP 250 PS 637: Performed by: STUDENT IN AN ORGANIZED HEALTH CARE EDUCATION/TRAINING PROGRAM

## 2025-03-07 PROCEDURE — 99232 SBSQ HOSP IP/OBS MODERATE 35: CPT | Performed by: INTERNAL MEDICINE

## 2025-03-07 RX ORDER — HYDROXYZINE HYDROCHLORIDE 10 MG/5ML
4 SYRUP ORAL EVERY 6 HOURS PRN
Status: DISCONTINUED | OUTPATIENT
Start: 2025-03-07 | End: 2025-03-07 | Stop reason: HOSPADM

## 2025-03-07 RX ORDER — METHOCARBAMOL 500 MG/1
500 TABLET, FILM COATED ORAL EVERY 6 HOURS PRN
Qty: 30 TABLET | Refills: 0 | Status: SHIPPED | OUTPATIENT
Start: 2025-03-07

## 2025-03-07 RX ORDER — ASPIRIN 325 MG
325 TABLET, DELAYED RELEASE (ENTERIC COATED) ORAL DAILY
Status: SHIPPED
Start: 2025-03-08

## 2025-03-07 RX ORDER — PSEUDOEPHEDRINE HYDROCHLORIDE 60 MG/1
60 TABLET, FILM COATED ORAL EVERY 6 HOURS PRN
Status: DISCONTINUED | OUTPATIENT
Start: 2025-03-07 | End: 2025-03-07 | Stop reason: HOSPADM

## 2025-03-07 RX ORDER — OXYCODONE HYDROCHLORIDE 5 MG/1
5 TABLET ORAL EVERY 4 HOURS PRN
Qty: 20 TABLET | Refills: 0 | Status: SHIPPED | OUTPATIENT
Start: 2025-03-07

## 2025-03-07 RX ORDER — AMOXICILLIN 250 MG
1-2 CAPSULE ORAL 2 TIMES DAILY
Qty: 56 TABLET | Refills: 0 | Status: SHIPPED | OUTPATIENT
Start: 2025-03-07 | End: 2025-03-21

## 2025-03-07 RX ADMIN — POLYETHYLENE GLYCOL 3350 17 G: 17 POWDER, FOR SOLUTION ORAL at 08:35

## 2025-03-07 RX ADMIN — INSULIN ASPART 2 UNITS: 100 INJECTION, SOLUTION INTRAVENOUS; SUBCUTANEOUS at 08:36

## 2025-03-07 RX ADMIN — INSULIN ASPART 3 UNITS: 100 INJECTION, SOLUTION INTRAVENOUS; SUBCUTANEOUS at 12:40

## 2025-03-07 RX ADMIN — LEVOTHYROXINE SODIUM 112 MCG: 112 TABLET ORAL at 06:34

## 2025-03-07 RX ADMIN — METOPROLOL SUCCINATE 50 MG: 50 TABLET, FILM COATED, EXTENDED RELEASE ORAL at 08:35

## 2025-03-07 RX ADMIN — MINOCYCLINE HYDROCHLORIDE 100 MG: 100 CAPSULE ORAL at 08:35

## 2025-03-07 RX ADMIN — ACETAMINOPHEN 975 MG: 325 TABLET, FILM COATED ORAL at 02:15

## 2025-03-07 RX ADMIN — SENNOSIDES AND DOCUSATE SODIUM 1 TABLET: 50; 8.6 TABLET ORAL at 08:35

## 2025-03-07 RX ADMIN — GABAPENTIN 300 MG: 300 CAPSULE ORAL at 08:35

## 2025-03-07 RX ADMIN — ACETAMINOPHEN 975 MG: 325 TABLET, FILM COATED ORAL at 08:35

## 2025-03-07 RX ADMIN — TAMSULOSIN HYDROCHLORIDE 0.4 MG: 0.4 CAPSULE ORAL at 08:35

## 2025-03-07 RX ADMIN — HYDROCHLOROTHIAZIDE 25 MG: 25 TABLET ORAL at 10:40

## 2025-03-07 RX ADMIN — PANTOPRAZOLE SODIUM 40 MG: 40 TABLET, DELAYED RELEASE ORAL at 08:35

## 2025-03-07 ASSESSMENT — ACTIVITIES OF DAILY LIVING (ADL)
ADLS_ACUITY_SCORE: 39
ADLS_ACUITY_SCORE: 36
ADLS_ACUITY_SCORE: 36
ADLS_ACUITY_SCORE: 39

## 2025-03-07 NOTE — PROGRESS NOTES
Chart reviewed, home medications reviewed and reconciled  for discharge, patient can be discharged from medicine standpoint.

## 2025-03-07 NOTE — PROGRESS NOTES
"Ortho Progress Note     Subjective:  No acute overnight events. Doing well this morning. Still with resolution of pre-operative sx. No new or worsening radicular pain, paresthesias, weakness. Pain with urination has resolved. Cleared by therapies. Motivated for discharge home.     Objective:  /75 (BP Location: Right arm)   Pulse 87   Temp 98.7  F (37.1  C) (Oral)   Resp 16   Ht 1.676 m (5' 6\")   Wt 84.6 kg (186 lb 6.4 oz)   SpO2 95%   BMI 30.09 kg/m    Gen: alert and appropriately interactive, no acute distress  CV: visible skin appears well-perfused, extremities warm to touch.   Resp: breathing equal and non-labored, no wheezing  MSK:    .    Spine:   Skin: Surgical dressing CDI without evidence of infection    Sensation:      R       L    C5:   Intact   Intact    C6:     Intact   Intact    C7:   Intact   Intact    C8:   Intact   Intact     Motor:     R L    C5: Deltoid   5  5    C6:   Biceps    5  5    C7: Triceps    5  5    C8:     5  5    T1: Intrinsics  5 5        Hemoglobin   Date Value Ref Range Status   03/06/2025 13.8 13.3 - 17.7 g/dL Final   01/06/2021 13.8 13.3 - 17.7 g/dL Final   10/21/2020 14.1 13.3 - 17.7 g/dL Final     Assessment/Plan:  POD 2 s/p C4-7 ACDF performed 3/05. Recovering as expected.     Goals of the day: discharge home once cleared by Medicine team.     -Activity:                                       Up with assist  -Weight Bearing Status:            WBAT   -Bracing:                                      Hard collar. May briefly remove for eating and hygiene if desired, and may remove to sleep.  -Antibiotics:                                  Completed   -Anticoagulation:                        SCDs  -Steroids:                                      Decadron - Completed   -Pain control:                               IV and PO, wean to PO as able  -Drain:                                          monitor and chart output  -Dressing:                                     Ok to " shower with dressing in place, dressing ok to get wet.  -Diet:                                             Regular      -Disposition:                                 Home today once cleared by Medicine  -Follow up:                                   2 weeks in [facility]    Cheryle Ojeda PA-C  Orthopedic Spine Surgery  [facility]

## 2025-03-07 NOTE — DISCHARGE SUMMARY
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    HOSPITAL COURSE:    Surgery was uncomplicated. Geoffrey Benjamin has done well post-operatively. Medicine was consulted post operatively to aid in management of medical comorbidities. Patient to follow up with Endocrine/PCP for diabetes follow up after discharge.     The patient received routine nursing cares and is medically stable. Vital signs are stable. The patient is tolerating a regular diet without GI distress/nausea or vomiting. Voiding spontaneously. All PT/OT goals have been met for safe mobility. Pain is now controlled on oral medications which will be available on discharge. Stool softeners have been used while taking pain medications to help prevent constipation.     Did have mild bleeding upon toro catheter removal in the immediate post-operative time frame. Experienced residual pain with urination yesterday, but all symptoms now resolved. Follow up with PCP with any recurrent sx.    Geoffrey Benjamin is deemed medically safe to discharge.     Antibiotics:  Ancef given periop and 24 hours postop.  PT Progress:  Has met PT/OT goals for safe mobility.   Pain Meds:  Weaned off all IV pain meds by discharge.  Inpatient Events: No significant events or complications.     Discharge orders and instructions as below.    FOLLOWUP:    Future Appointments   Date Time Provider Department Center   10/16/2025 10:30 AM Ike Lora MD [facility]       Follow up in 2 weeks in Dr. Villalobos's clinic  Follow up with PCP and/or Endocrinology in 7 days for  post-operative check in and blood glucose/diabetes follow up      PLANNED DISCHARGE ORDERS:     DVT Prophylaxis: Mobilization, may resume home ASA POD 3        Current Discharge Medication List        START taking these medications    Details   methocarbamol (ROBAXIN) 500 MG tablet Take 1 tablet (500 mg) by mouth every 6 hours as needed for muscle spasms.  Qty: 30 tablet, Refills: 0    Associated Diagnoses: S/P cervical spinal fusion      oxyCODONE (ROXICODONE) 5 MG tablet Take 1 tablet (5 mg) by mouth every 4 hours as needed for moderate to severe pain.  Qty: 20 tablet, Refills: 0    Associated Diagnoses: S/P cervical spinal fusion      senna-docusate (SENOKOT-S/PERICOLACE) 8.6-50 MG tablet Take 1-2 tablets by mouth 2 times daily for 14 days. May discontinue once bowel movements become regular  Qty: 56 tablet, Refills: 0    Associated Diagnoses: S/P cervical spinal fusion           CONTINUE these medications which have CHANGED    Details   aspirin (ASA) 325 MG EC tablet Take 1 tablet (325 mg) by mouth daily.    Associated Diagnoses: S/P total knee arthroplasty, right           CONTINUE these medications which have NOT CHANGED    Details   acetaminophen (TYLENOL) 325 MG tablet Take 2 tablets (650 mg) by mouth every 4 hours as needed for other (mild pain)  Qty: 100 tablet, Refills: 0    Associated Diagnoses: S/P total knee arthroplasty, right      amoxicillin (AMOXIL) 500 MG capsule Take 2,000 mg by mouth once as needed (1 hour prior to dental appointments 4 x 500 mg = 2,000 mg)      atorvastatin (LIPITOR) 10 MG tablet Take 10 mg by mouth At Bedtime      BIOFLAVONOIDS PO Take 1 capsule by mouth daily.      Carboxymethylcellul-Glycerin (REFRESH OPTIVE OP) Apply 1 drop to eye 2 times daily      chlorpheniramine-pseudoePHEDrine (PSEUDO-GEST PLUS) 4-60 MG TABS per tablet Take 1 tablet by mouth every evening.      Coenzyme Q10 (COQ10) 200 MG CAPS Take 1 capsule by mouth At Bedtime      empagliflozin (JARDIANCE) 10 MG TABS  tablet Take 10 mg by mouth daily      fish oil-omega-3 fatty acids 1000 MG capsule Take 1 g by mouth daily      gabapentin (NEURONTIN) 300 MG capsule Take 300 mg by mouth 3 times daily.      glimepiride (AMARYL) 4 MG tablet Take 6 mg by mouth every morning (before breakfast) (1.5 x 4 mg = 6 mg)      Glucosamine-Chondroit-Vit C-Mn (GLUCOSAMINE CHONDROITIN 1500 COMPLEX) CAPS Take 1 tablet by mouth 2 times daily       hydrochlorothiazide (HYDRODIURIL) 25 MG tablet Take 25 mg by mouth daily       insulin glargine (LANTUS PEN) 100 UNIT/ML pen Inject 21 Units subcutaneously at bedtime.      levothyroxine (SYNTHROID/LEVOTHROID) 112 MCG tablet Take 112 mcg by mouth every morning (before breakfast)      magnesium hydroxide (MILK OF MAGNESIA) 400 MG/5ML suspension Take 15 mLs by mouth 2 times daily as needed for constipation or heartburn  Qty: 355 mL, Refills: 0    Associated Diagnoses: S/P total knee arthroplasty, right      metFORMIN (GLUCOPHAGE) 500 MG tablet Take 1,500 mg by mouth every evening (3 x 500 mg = 1,500 mg)      metoprolol succinate ER (TOPROL-XL) 50 MG 24 hr tablet Take 1 tablet (50 mg) by mouth daily  Qty: 30 tablet, Refills: 0    Comments: Future refills by PCP Dr. Kike Ng or Dr. Springer of [facility].  Associated Diagnoses: Coronary artery disease involving native coronary artery of native heart without angina pectoris      minocycline (DYNACIN) 100 MG tablet Take 100 mg by mouth daily       multivitamin w/minerals (THERA-VIT-M) tablet Take 1 tablet by mouth daily      omeprazole (PRILOSEC) 20 MG DR capsule Take 20 mg by mouth daily      tadalafil (CIALIS) 20 MG tablet Take 1 tablet (20 mg) by mouth daily as needed (for intercourse).  Qty: 30 tablet, Refills: 11    Associated Diagnoses: Erectile dysfunction, unspecified erectile dysfunction type      tamsulosin (FLOMAX) 0.4 MG capsule Take 1 capsule (0.4 mg) by mouth daily. NEED APPT FOR FUTURE REFILLS OR SEEK REFILLS FROM  PRIMARY DOC  Qty: 90 capsule, Refills: 3    Associated Diagnoses: Benign prostatic hyperplasia with weak urinary stream      testosterone cypionate (DEPOTESTOSTERONE) 200 MG/ML injection Inject 50 mg into the muscle every 21 days.      vitamin B complex with vitamin C (STRESS TAB) tablet Take 1 tablet by mouth daily      BD PEN NEEDLE SARMAD 2ND GEN 32G X 4 MM miscellaneous USE ONCE DAILY AS DIRECTED      Blood Glucose Monitoring Suppl (ONETOUCH VERIO FLEX SYSTEM) w/Device KIT USE AS DIRECTED TO CHECK GLUCOSE      ONETOUCH VERIO IQ test strip TESTING 1 TIMES DAILY E11.9               Discharge Procedure Orders   Discharge Instructions   Order Comments: Review outpatient procedure discharge instructions as directed by Provider.     Discharge Instructions - Contact surgery team   Order Comments: Contact surgical team with any new swelling or tightness to the throat/incision site if it is causing discomfort.     Call 911 if it becomes difficult to breath as this is a medical emergency.     Discharge Instructions - No tub bathing   Order Comments: Tub bathing, swimming, or any other activities that will cause your incision to be submerged in water should be avoided until allowed by your Provider.     Discharge Instructions - Diet   Order Comments: Diet as tolerated. Return to diet before surgery. Begin with small bites/sips and soft foods. You may progress your diet as tolerated.     Discharge Instructions - Lifting Limit (specify)   Order Comments: Lifting limit of  5-10 pounds until seen at Post-op follow up appointment.     Return to Clinic - in 2 weeks   Order Comments: Return to Clinic in 2 weeks     Discharge Instructions   Order Comments: Check with Provider for instructions about when to start anticoagulant medication.     No Aspirin or NSAID products   Order Comments: No aspirin or non-steroidal anti-inflammatory drugs (NSAIDs) such as ibuprofen or naproxen until cleared at post-operative appointment     No  driving or operating machinery while in a cervical collar   Order Comments: Until follow-up appointment.     Notify Provider   Order Comments: Signs and symptoms of infection: Fever greater than 101, redness, swelling, heat at site, drainage, or pus     Discharge Instructions - Neck brace   Order Comments: Wear neck brace at all times.   May briefly remove for eating and hygiene if desired, and may remove to sleep.     Discharge Instructions - Collar Care   Order Comments: Inspect the skin under your collar TWICE each day for any skin irritation or redness. Switch the collar pads ONCE each day at bedtime.    To remove collar for skin inspection or collar pad changes:  - Remove the collar while lying flat.   - Release front portion first, then the back.    Collar pad care:  - Wash the pads with warm water and air-dry overnight.     Reason for your hospital stay   Order Comments: Anterior Cervical Discectomy and Fusion C4-7     Activity   Order Comments: Your activity upon discharge: weight bear as tolerated. See formal activity restrictions in discharge instructions.     Order Specific Question Answer Comments   Is discharge order? Yes      Discharge Instructions   Order Comments: Care after Spine Surgery - Dr. Callum Villalobos    The following information will help you through your recovery at home.    Pain  - It is normal for you to experience some pain in the area of your incision after your surgery, and often between the shoulder blades.  This will improve over the coming couple of weeks. You may use ice and/or heat on your shoulders as tolerated. Do not place ice or heat directly on your incision or near your incision site.    - You should call Dr. Villalobos's office if arm pain returns suddenly and does not improve over 24 hours.    Pain Management  - Take your prescribed pain medication as needed and directed.  You may use Tylenol and methocarbamol for baseline pain control. You may use the oxycodone for breakthrough  pain. Plan to wean off of oxycodone over the next several days. You may continue to use Tylenol and methocarbamol for your discomfort when you no longer need the narcotic pain medication.     - Do not use ibuprofen (Advil), naproxen (Aleve), diclofenac (Voltaren), meloxicam or other NSAIDs until cleared to do so by Dr. Villalobos's team. Use of these types of medications can delay appropriate bone healing.     - If you need a refill on your pain medication call [phone], please allow 24 hours for your prescription to be refilled, Dr. Villalobos's office does not refill pain medications on Friday afternoons.    Bracing:  - You should have received a hard cervical collar during the hospital.  You should wear this at all times for the next several weeks, although you may briefly remove it to eat, shower, and when relaxing in the upright position. Make sure to maintain a neutral position as much as possible and avoid turning your neck while the collar is removed.    - You should wear the cervical collar while sleeping for the first 2 weeks.   - Further direction regarding the cervical collar will be given at your post-operative assessments.    Home Medications  - If you take a blood thinner (e.g. aspirin, warfarin, Xeralto, Eliquis, etc...) at baseline, wait until THREE days after the operation to start taking it again.  This is to lower the risk of a blood collection pushing on the nerves in the surgery site.  If after starting your blood thinner again, if you notice severe worsening neck swelling or arm pain, contact Dr Villalobos's office immediately, as this could represent a blood collection forming.    Activity  - You may increase your activity as tolerated; walking is the best form of exercise after spine surgery.    - For six weeks after surgery avoid bending, lifting, and twisting of the neck (BLT).  Avoid activities such as vacuuming, raking and shoveling.   - Try to limit your lifting to 5-10 lbs during the first 2 weeks  and then increase to 20-25lbs over the next 6 weeks.  - Do not perform any activities which place strain on your neck, chest, shoulders, or upper back   - You may return to work approximately 1- 2 weeks after your surgery if you have a sedentary or desk type job.  If you have a physical job Dr. Villalobos and his staff will help you determine a return to work plan.    - You may resume sexual activity when you feel ready.  Stop if you have pain.    Driving  - Do not drive until cleared to do so by Dr. Villalobos's team.    Incision Site   - Keep a dressing over your incision for the first week.  If the dressing you left the hospital with remains intact and in place, with no water beneath it, you may leave this on.  If water gets underneath it, you should remove it and replace it with a dry gauze dressing (can place a tegaderm or tape over the top of the gauze).  Wash hands prior to changing the dressing. If after a week there is no drainage on dressing, you may leave the incision open to air.  If there is ongoing drainage five days after surgery, please contact Dr Villalobos's office.    Diet   - Start with eating soft foods and taking small bites. Gradually progress your diet as tolerated.   - Eat a healthy diet; this will help your recovery.  - Drink plenty of fluids, water, milk or juice.  - Take your prescribed stool softener as directed.   - If you have trouble with constipation you should eat more fiber, drink more fluids, increase your walking or try an over the counter laxative.    Follow-up Visits  - You will see Cheryle Ojeda PA-C for your 2-week post-operative follow up appointment. You will see Dr. Villalobos for your 6-week post-operative follow up appointment. You should have had both of these appointments scheduled for you at the same time your surgery was scheduled. If you did not, please call Dr. Villalobos's office when you get home from your surgery.  - Write down any questions you have about your surgery, recovery, return  to work and other topics you wished to be covered at your post-op visit.  This way, we will be able to address all of your questions at your next visit.  - Call Dr. Villalobos's office if you have any questions or concerns.    When to Call your Doctor:  - If you have any redness, warmth or swelling at the incision site.  - If your incision opens up.  - If you have increasing drainage from your incision.  - If you have a temperature greater than 100.5 degrees Fahrenheit.  - If you develop dramatic swelling in the front of your neck.  If this swelling is making it difficult to breathe, go immediately to the emergency department, by ambulance if necessary.     Diet   Order Comments: Follow this diet upon discharge:     Start with small bites, soft foods, and liquids and slowly progress your diet as tolerated.     Order Specific Question Answer Comments   Is discharge order? Yes      Hospital Follow-up with Existing Primary Care Provider (PCP)   Standing Status: Future Standing Exp. Date: 04/06/25   Order Comments: Glucose recheck/management     Order Specific Question Answer Comments   Schedule Primary Care visit within 7 Days            Cheryle Ojeda PA-C  Orthopaedic Spine Surgery  [facility]

## 2025-03-07 NOTE — PROGRESS NOTES
Patient vital signs are at baseline: Yes  Patient able to ambulate as they were prior to admission or with assist devices provided by therapies during their stay:  Yes , A1 GB/W  Patient MUST void prior to discharge:  Yes bathroom  Patient able to tolerate oral intake:  Yes  Pain has adequate pain control using Oral analgesics:  denied   Does patient have an identified :  pending   Has goal D/C date and time been discussed with patient:  No,  Reason:  discharge pending   A&Ox4,  POD#1 of c4 - c7 fusion, cervical collar at all the time, BS checked.

## 2025-03-07 NOTE — PROGRESS NOTES
VSS. A/O. SBA. Neck incision CDI. Collar on. Denies pain. Tolerating diet. Voiding fine. CMS intact. D'c home. Meds/papers given.

## 2025-03-07 NOTE — PROGRESS NOTES
[facility]    Hospitalist Progress Note    Assessment & Plan   Geoffrey Benjamin is a 79 year old male with past medical history significant for hypertension, hypercholesterolemia, DM II without complication, CAD s/p CABG (2006), hypothyroidism, TIA (11/2023), spinal stenosis s/p lumbar laminotomy (2022), BPH with LUTS, GERD, among others, admitted on 03/05/2025 following a C4-C7 anterior cervical discectomy and fusion. POD #0.      Cervical spinal stenosis and myelopathy s/p C4-C7 anterior cervical discectomy and fusion  *Procedure completed under general anesthesia by Dr. Callum Villalobos. No complications noted. 15 mL ml EBL.   - Hgb 13.9 pre-operatively.  Postoperative hemoglobin stable  - Post-operative hgb stable 13.8  - Defer post-operative surgical management to primary service including pain management, disposition and therapies.         DM II without complication  *Follows with endocrine. A1c 7.9 10/2020. On PTA Jardiance (last dose 03/01/2025 prior to surgery), glimepiride, lantus (12 units bedtime) and metformin.   - Hold PTA Jardiance, Glimepiride and Metformin. Resume all upon discharge with follow-up with Endocrine post-operatively.   Sugars improved after his PTA dose of Lantus was restarted, he was also on multiple oral hypoglycemics which was on hold, this was restarted upon discharge.     Hypertension   Hyperlipidemia   History of CAD s/p CABG (2006)  *Followed with Cardiology in past although has not seen Cardiology since 2021. Last echocardiogram 01/2021 with EF 65-70%. Mild chordal systolic anterior motion of mitral valve suspected but not optimally visualized. On PTA metoprolol and hydrochlorothiazide for hypertension. Additional PTA medications to be managed by primary service include atorvastatin.   *Patient denying recent history of chest pain, shortness of breath, palpitations or lightheadedness at this time.  Following surgery blood pressures were low so metoprolol was  "started, later on hydrochlorothiazide was also restarted, blood pressures remained stable at the time of discharge.  Meds were reviewed for discharge planning.      Diet :Regular Diet Adult  Diet  DVT Prophylaxis: Defer to primary service  Code Status: Full Code     42 MINUTES SPENT BY ME on the date of service doing chart review, history, exam, documentation & further activities per the note.  Medically Ready for Discharge: Ready Now    Clinically Significant Risk Factors                              # Obesity: Estimated body mass index is 30.09 kg/m  as calculated from the following:    Height as of this encounter: 1.676 m (5' 6\").    Weight as of this encounter: 84.6 kg (186 lb 6.4 oz)., PRESENT ON ADMISSION            Bernie Graves MD  Text Page   (7am to 6pm)    Interval History   Patient resting, eager to go home, no new issues noted.    -Data reviewed today: I reviewed all new labs and imaging results over the last 24 hours.   Physical Exam     Vital Signs with Ranges  Temp:  [97.8  F (36.6  C)-98.7  F (37.1  C)] 97.8  F (36.6  C)  Pulse:  [80-99] 93  Resp:  [16] 16  BP: (116-135)/(58-77) 135/77  SpO2:  [94 %-97 %] 94 %  I/O last 3 completed shifts:  In: 200 [P.O.:200]  Out: 1450 [Urine:1450]    Constitutional: Awake, alert, cooperative, no apparent distress, neck collar+  Respiratory: Clear to auscultation bilaterally, no crackles or wheezing  Cardiovascular: Regular rate and rhythm, normal S1 and S2, and no murmur noted  GI: Normal bowel sounds, soft, non-distended, non-tender  Skin/Integumen: No rashes, no cyanosis, no edema  Neuro : moving all 4 extremities, no focal deficit noted     Medications   Current Facility-Administered Medications   Medication Dose Route Frequency Provider Last Rate Last Admin    sodium chloride 0.9 % infusion   Intravenous Continuous Cheryle Ojeda PA-C   Stopped at 03/06/25 1108     Current Facility-Administered Medications   Medication Dose Route Frequency Provider " Last Rate Last Admin    acetaminophen (TYLENOL) tablet 975 mg  975 mg Oral Q8H Cheryle Ojeda PA-C   975 mg at 03/07/25 0835    atorvastatin (LIPITOR) tablet 10 mg  10 mg Oral At Bedtime Cheryle Ojeda PA-C   10 mg at 03/06/25 2237    gabapentin (NEURONTIN) capsule 300 mg  300 mg Oral TID Cheryle Ojeda PA-C   300 mg at 03/07/25 0835    hydrochlorothiazide (HYDRODIURIL) tablet 25 mg  25 mg Oral Daily Bernie Graves MD        insulin aspart (NovoLOG) injection (RAPID ACTING)  1-7 Units Subcutaneous TID  Mirtha Paulino PA-C   2 Units at 03/07/25 0836    insulin aspart (NovoLOG) injection (RAPID ACTING)  1-5 Units Subcutaneous At Bedtime Mirtha Paulino PA-C   3 Units at 03/06/25 2234    insulin glargine (LANTUS PEN) injection 21 Units  21 Units Subcutaneous At Bedtime Bernie Graves MD   21 Units at 03/06/25 2235    levothyroxine (SYNTHROID/LEVOTHROID) tablet 112 mcg  112 mcg Oral QAM AC Cheryle Ojeda PA-C   112 mcg at 03/07/25 0634    metoprolol succinate ER (TOPROL XL) 24 hr tablet 50 mg  50 mg Oral Daily Mirtha Paulino PA-C   50 mg at 03/07/25 0835    minocycline (MINOCIN) capsule 100 mg  100 mg Oral Daily Cheryle Ojeda PA-C   100 mg at 03/07/25 0835    pantoprazole (PROTONIX) EC tablet 40 mg  40 mg Oral Daily Cheryle Ojeda PA-C   40 mg at 03/07/25 0835    polyethylene glycol (MIRALAX) Packet 17 g  17 g Oral Daily Cheryle Ojeda PA-C   17 g at 03/07/25 0835    senna-docusate (SENOKOT-S/PERICOLACE) 8.6-50 MG per tablet 1 tablet  1 tablet Oral BID Cheryle Ojeda PA-C   1 tablet at 03/07/25 0835    sodium chloride (PF) 0.9% PF flush 3 mL  3 mL Intracatheter Q8H Cheryle Ojeda PA-C   3 mL at 03/07/25 0838    tamsulosin (FLOMAX) capsule 0.4 mg  0.4 mg Oral Daily Cheryle Ojeda PA-C   0.4 mg at 03/07/25 0835       Data   Recent Labs   Lab 03/07/25  0618 03/07/25  0150 03/06/25  2149 03/06/25  1212 03/06/25  1039  03/05/25  1146 03/05/25  0624   HGB  --   --   --   --  13.8  --   --    NA  --   --   --   --  138  --   --    POTASSIUM  --   --   --   --  4.2  --  3.5   CHLORIDE  --   --   --   --  101  --   --    CO2  --   --   --   --  23  --   --    BUN  --   --   --   --  15.5  --   --    CR  --   --   --   --  0.80  --   --    ANIONGAP  --   --   --   --  14  --   --    RULA  --   --   --   --  9.1  --   --    * 236* 346*   < > 222*   < > 175*    < > = values in this interval not displayed.     Recent Labs   Lab 03/07/25  0618 03/07/25  0150 03/06/25  2149 03/06/25  1212 03/06/25  1039   * 236* 346* 302* 222*       Imaging:   Recent Results (from the past 24 hours)   XR Cervical Spine 2/3 Views    Narrative    EXAM: XR CERVICAL SPINE 2/3 VIEWS  LOCATION: [facility]  DATE: 3/6/2025    INDICATION: S p cervical fusion  COMPARISON: None.      Impression    IMPRESSION: C4-C7 interbody, anterior plate and screw instrumented fusion. Hardware appears in satisfactory position. Preserved vertebral body heights and alignment. Postsurgical prominence of the prevertebral soft tissues.

## 2025-03-07 NOTE — PLAN OF CARE
Goal Outcome Evaluation:         Pt A&Ox4. A1 GB/W. Anterior neck dressing CDI, C collar in place. Reports minimal pain, scheduled tylenol given. MARY DIAZ. Plan for discharge to home today.

## 2025-05-03 ENCOUNTER — HEALTH MAINTENANCE LETTER (OUTPATIENT)
Age: 80
End: 2025-05-03

## 2025-05-18 DIAGNOSIS — N40.1 BENIGN PROSTATIC HYPERPLASIA WITH WEAK URINARY STREAM: ICD-10-CM

## 2025-05-18 DIAGNOSIS — R39.12 BENIGN PROSTATIC HYPERPLASIA WITH WEAK URINARY STREAM: ICD-10-CM

## 2025-05-21 NOTE — TELEPHONE ENCOUNTER
Last Written Prescription:     tamsulosin (FLOMAX) 0.4 MG capsule 90 capsule 3 10/10/2024 -- No   Sig - Route: Take 1 capsule (0.4 mg) by mouth daily. NEED APPT FOR FUTURE REFILLS OR SEEK REFILLS FROM PRIMARY DOC - [facility]     ----------------------  Last Visit Date: 10/10/24  Future Visit Date: 10/20/2025  ----------------------         Refill decision: Medication unable to be refilled by RN due to: Other:   Failed - Patient does not have Tadalafil, Vardenafil, or Sildenafil on their medication list         Request from pharmacy:  Requested Prescriptions   Pending Prescriptions Disp Refills    tamsulosin (FLOMAX) 0.4 MG capsule [Pharmacy Med Name: TAMSULOSIN HCL 0.4 MG CAPSULE] 90 capsule 3     Sig: TAKE 1 CAPSULE (0.4 MG) BY MOUTH DAILY NEED APPT FOR FUTURE REFILLS OR SEEK REFILLS FROM PRIMARY DOC       Alpha Blockers Failed - 5/21/2025  1:57 PM        Failed - Patient does not have Tadalafil, Vardenafil, or Sildenafil on their medication list        Passed - Most recent blood pressure under 140/90 in past 12 months     BP Readings from Last 3 Encounters:   03/07/25 135/77   11/22/22 103/56   11/02/22 112/60       No data recorded            Passed - Medication is active on med list and the sig matches. RN to manually verify dose and sig if red X/fail.     If the protocol passes (green check), you do not need to verify med dose and sig.    A prescription matches if they are the same clinical intention.    For Example: once daily and every morning are the same.    The protocol can not identify upper and lower case letters as matching and will fail.     For Example: Take 1 tablet (50 mg) by mouth daily     TAKE 1 TABLET (50 MG) BY MOUTH DAILY    For all fails (red x), verify dose and sig.    If the refill does match what is on file, the RN can still proceed to approve the refill request.       If they do not match, route to the appropriate provider.             Passed - Recent (12 month) or future (90 days) visit with  authorizing provider's specialty (provided they have been seen in the past 15 months)     The patient must have completed an in-person or virtual visit within the past 12 months or has a future visit scheduled within the next 90 days with the authorizing provider s specialty.  Urgent care and e-visits do not qualify as an office visit for this protocol.          Passed - Medication indicated for associated diagnosis     Medication is associated with one or more of the following diagnoses:  Benign prostatic hyperplasia  Disorder of the urinary system  Erectile dysfunction  Neurogenic bladder  Overactive bladder  Raynaud s  Ureteral stent-related symptoms  Urinary retention  Posttraumatic Stress Disorder  Lower urinary tract symptoms  Hypertensive disorder  Urinary frequency due to benign prostatic hypertrophy          Passed - Patient is 18 years of age or older

## 2025-05-24 RX ORDER — TAMSULOSIN HYDROCHLORIDE 0.4 MG/1
0.4 CAPSULE ORAL DAILY
Qty: 90 CAPSULE | Refills: 3 | Status: SHIPPED | OUTPATIENT
Start: 2025-05-24

## 2025-08-16 ENCOUNTER — HEALTH MAINTENANCE LETTER (OUTPATIENT)
Age: 80
End: 2025-08-16

## 2025-08-16 DIAGNOSIS — N39.41 URGE INCONTINENCE: ICD-10-CM

## 2025-08-19 RX ORDER — TROSPIUM CHLORIDE ER 60 MG/1
60 CAPSULE ORAL
Qty: 90 CAPSULE | Refills: 0 | Status: SHIPPED | OUTPATIENT
Start: 2025-08-19

## (undated) DEVICE — POSITIONER PT PRONESAFE HEAD REST W/DERMAPROX INSERT 40599

## (undated) DEVICE — SU VICRYL 2-0 CT-2 CR 8X18" J726D

## (undated) DEVICE — BAG CLEAR TRASH 1.3M 39X33" P4040C

## (undated) DEVICE — BONE CLEANING TIP INTERPULSE  0210-010-000

## (undated) DEVICE — GLOVE PROTEXIS BLUE W/NEU-THERA 8.0  2D73EB80

## (undated) DEVICE — GLOVE PROTEXIS BLUE W/NEU-THERA 7.0  2D73EB70

## (undated) DEVICE — SU MONOCRYL 4-0 PS-2 18" UND Y496G

## (undated) DEVICE — DRSG GAUZE 2X2" 8042

## (undated) DEVICE — DRAPE STERI TOWEL LG 1010

## (undated) DEVICE — TUBING SUCTION MEDI-VAC SOFT 3/16"X20' N520A

## (undated) DEVICE — SPONGE COTTONOID 1/2X3" 80-1407

## (undated) DEVICE — SPONGE SURGIFOAM 12 1972

## (undated) DEVICE — TAPE DURAPORE 3" SILK 1538-3

## (undated) DEVICE — PREP POVIDONE IODINE SCRUB 7.5% 120ML

## (undated) DEVICE — GLOVE BIOGEL PI MICRO SZ 7.0 48570

## (undated) DEVICE — BLADE SAW SAGITTAL STRK 18X90X1.37MM HD SYS 6 6118-137-090

## (undated) DEVICE — GLOVE PROTEXIS POWDER FREE SMT 7.5  2D72PT75X

## (undated) DEVICE — LASER FIBER HOLMIUM 365UM HTB-365

## (undated) DEVICE — SU MONOCRYL 3-0 PS-2 27" Y427H

## (undated) DEVICE — TOOL DISSECT MIDAS MR8 12CM SP MATCH SYM-TRI MR8-SP12MH30T

## (undated) DEVICE — SHEATH URETERAL ACCESS NAVIGATOR HD 12/14FRX46CM M0062502260

## (undated) DEVICE — LINEN FULL SHEET 5511

## (undated) DEVICE — ESU PENCIL W/HOLSTER E2350H

## (undated) DEVICE — NDL SPINAL 18GA 3.5" 405184

## (undated) DEVICE — LINEN TOWEL PACK X5 5464

## (undated) DEVICE — SOL WATER IRRIG 1000ML BOTTLE 2F7114

## (undated) DEVICE — GLOVE BIOGEL PI MICRO INDICATOR UNDERGLOVE SZ 7.0 48970

## (undated) DEVICE — PACK CYSTO CUSTOM RIDGES

## (undated) DEVICE — STENT URETERAL PERCUFLEX PLUS 4.8FRX26CM: Type: IMPLANTABLE DEVICE | Site: URETER | Status: NON-FUNCTIONAL

## (undated) DEVICE — DRSG GAUZE 4X4" 8044

## (undated) DEVICE — GOWN XLG DISP 9545

## (undated) DEVICE — ZIPWIRE

## (undated) DEVICE — PREP CHLORAPREP 26ML TINTED ORANGE  260815

## (undated) DEVICE — PREP POVIDONE IODINE SOLUTION 10% 120ML

## (undated) DEVICE — BONE CEMENT MIXEVAC III HI VAC KIT  0206-015-000

## (undated) DEVICE — SUCTION IRR SYSTEM W/O TIP INTERPULSE HANDPIECE 0210-100-000

## (undated) DEVICE — DRAPE SHEET REV FOLD 3/4 9349

## (undated) DEVICE — DRAPE MAYO STAND 23X54 8337

## (undated) DEVICE — STRAP POSITIONING VELCRO 13' CERVICAL HARNESS 920877

## (undated) DEVICE — DRAPE COVER C-ARM SEAMLESS SNAP-KAP 03-KP26 LATEX FREE

## (undated) DEVICE — PIN DISTRACTION ANCHOR FOR SCR 14MM MDS9091414

## (undated) DEVICE — DRAPE MICROSCOPE LEICA 54X150" AR8033650

## (undated) DEVICE — SU VICRYL 0 CP-1 27" J467H

## (undated) DEVICE — BLADE KNIFE SURG 15 371115

## (undated) DEVICE — DRSG TEGADERM 2 3/8X2 3/4" 1624W

## (undated) DEVICE — CATH URETERAL FLEX TIP TIGERTAIL 06FRX70CM 139006

## (undated) DEVICE — STPL SKIN 35W 6.9MM  PXW35

## (undated) DEVICE — LINEN HALF SHEET 5512

## (undated) DEVICE — COVER FOOTSWITCH W/CINCH 20X24" 923267

## (undated) DEVICE — SYR 50ML LL W/O NDL 309653

## (undated) DEVICE — TOOL DISSECT MIDAS MR8 12CM TELESC MATCH DMD MR8-T12MH30D

## (undated) DEVICE — PREP CHLORAPREP W/ORANGE TINT 10.5ML 260715

## (undated) DEVICE — ESU GROUND PAD UNIVERSAL W/O CORD

## (undated) DEVICE — TUBING IRRIG TUR Y TYPE 96" LF 6543-01

## (undated) DEVICE — SU VICRYL+ 3-0 27IN SH UND VCP416H

## (undated) DEVICE — PACK SPINE SM CUSTOM SNE15SSFSK

## (undated) DEVICE — MANIFOLD NEPTUNE 4 PORT 700-20

## (undated) DEVICE — IMM COLLAR CERVICAL MED UNIVERSAL 3X24" 79-83500

## (undated) DEVICE — WRAP EZY KNEE

## (undated) DEVICE — SOL WATER IRRIG 3000ML BAG 2B7117

## (undated) DEVICE — DRSG KERLIX FLUFFS X5

## (undated) DEVICE — BAG DECANTER STERILE WHITE DYNJDEC09

## (undated) DEVICE — SPONGE COTTONOID 1/2X1/2" 80-1400

## (undated) DEVICE — SUCTION MANIFOLD NEPTUNE 2 SYS 4 PORT 0702-020-000

## (undated) DEVICE — GUIDEWIRE SENSOR DUAL FLEX STR 0.035"X150CM M0066703080

## (undated) DEVICE — SU SILK 2-0 FSL 18" 677G

## (undated) DEVICE — SOL NACL 0.9% IRRIG 1000ML BOTTLE 2F7124

## (undated) DEVICE — KIT ENDO FIRST STEP DISINFECTANT 200ML W/POUCH EP-4

## (undated) DEVICE — SOL NACL 0.9% IRRIG 3000ML BAG 2B7477

## (undated) DEVICE — PACK TOTAL KNEE SOP15TKFSD

## (undated) DEVICE — GLOVE PROTEXIS POWDER FREE 7.0 ORTHOPEDIC 2D73ET70

## (undated) DEVICE — TOOL DISSECT MIDAS MR8 14CM MATCH HEAD 3MM MR8-14MH30

## (undated) DEVICE — SOLUTION WOUND CLEANSING 3/4OZ 10% PVP EA-L3011FB-50

## (undated) DEVICE — SPONGE SURGIFOAM 100 1974

## (undated) DEVICE — SU VICRYL 2-0 CP-1 27" UND J266H

## (undated) DEVICE — GUIDEWIRE URO STR STIFF .035"X150CM NITINOL 150NSS35

## (undated) DEVICE — SYR EAR 3OZ BULB IRR STRL DISP BLU PVC 4173

## (undated) DEVICE — RX VISTASEAL FIBRIN SEALANT W/THROMBIN 4ML VST04

## (undated) DEVICE — SOL ADH LIQUID BENZOIN SWAB 0.6ML C1544

## (undated) DEVICE — SU VICRYL 2-0 CT-2 27" UND J269H

## (undated) DEVICE — DRAIN ROUND W/RESERV KIT JACKSON PRATT 10FR 400ML SU130-402D

## (undated) DEVICE — DRSG XEROFORM 5X9" 8884431605

## (undated) DEVICE — PAD CHUX UNDERPAD 23X24" 7136

## (undated) DEVICE — GLOVE BIOGEL PI MICRO SZ 6.5 48565

## (undated) DEVICE — RX SURGIFLO HEMOSTATIC MATRIX W/THROMBIN 8ML 2994

## (undated) DEVICE — HOOD FLYTE W/PEELAWAY 408-800-100

## (undated) DEVICE — SU ETHILON 2-0 FS 18" 664H

## (undated) DEVICE — GLOVE BIOGEL PI MICRO INDICATOR UNDERGLOVE SZ 7.5 48975

## (undated) DEVICE — GLOVE PROTEXIS POWDER FREE 7.5 ORTHOPEDIC 2D73ET75

## (undated) DEVICE — RAD RX ISOVUE 300 (50ML) 61% IOPAMIDOL CHARGE PER ML

## (undated) DEVICE — GLOVE PROTEXIS POWDER FREE 8.0 ORTHOPEDIC 2D73ET80

## (undated) DEVICE — CAST PADDING 4" COTTON WEBRIL UNSTERILE 9084

## (undated) DEVICE — PREP CHLORAPREP 26ML TINTED HI-LITE ORANGE 930815

## (undated) DEVICE — CAST PADDING 6" UNSTERILE 9046

## (undated) DEVICE — SPONGE COTTONOID 1X1" 80-1403

## (undated) DEVICE — DRSG STERI STRIP 1X5" R1548

## (undated) DEVICE — WIRE GUIDE AMPLATZ SUPER STIFF 0.035"X145CM 46-524

## (undated) DEVICE — DUAL LUMEN URETERAL CATHETER

## (undated) DEVICE — SPONGE COTTONOID 1X3" 80-1408

## (undated) DEVICE — Device

## (undated) DEVICE — BASKET NITINOL TIPLESS HALO  1.5FRX120CM 554120

## (undated) DEVICE — BASKET RETRIEVAL 1.9FRX120CM ESCAPE NTNL 4 WIRE 390-201

## (undated) DEVICE — LASER FIBER HOLMIUM 272UM HTB-272

## (undated) DEVICE — SU ETHIBOND 0 CTX CR  8X18" CX31D

## (undated) DEVICE — GLOVE BIOGEL PI ULTRATOUCH SZ 6.5 41165

## (undated) DEVICE — DRSG DRAIN 4X4" 7086

## (undated) DEVICE — SUCTION TIP YANKAUER W/O VENT K86

## (undated) DEVICE — DRAPE IOBAN INCISE 23X17" 6650EZ

## (undated) DEVICE — BLADE SAW SAGITTAL STRK 25X90X1.37MM 4H SYS 6 6125-137-090

## (undated) DEVICE — SPONGE KITTNER 30-101

## (undated) DEVICE — DRAPE STERI U 1015

## (undated) RX ORDER — APREPITANT 40 MG/1
CAPSULE ORAL
Status: DISPENSED
Start: 2025-03-05

## (undated) RX ORDER — LIDOCAINE HYDROCHLORIDE 20 MG/ML
INJECTION, SOLUTION EPIDURAL; INFILTRATION; INTRACAUDAL; PERINEURAL
Status: DISPENSED
Start: 2022-11-21

## (undated) RX ORDER — DEXAMETHASONE SODIUM PHOSPHATE 4 MG/ML
INJECTION, SOLUTION INTRA-ARTICULAR; INTRALESIONAL; INTRAMUSCULAR; INTRAVENOUS; SOFT TISSUE
Status: DISPENSED
Start: 2018-05-08

## (undated) RX ORDER — PROPOFOL 10 MG/ML
INJECTION, EMULSION INTRAVENOUS
Status: DISPENSED
Start: 2022-11-21

## (undated) RX ORDER — CEFAZOLIN SODIUM 2 G/100ML
INJECTION, SOLUTION INTRAVENOUS
Status: DISPENSED
Start: 2020-10-20

## (undated) RX ORDER — ONDANSETRON 2 MG/ML
INJECTION INTRAMUSCULAR; INTRAVENOUS
Status: DISPENSED
Start: 2022-11-21

## (undated) RX ORDER — DEXAMETHASONE SODIUM PHOSPHATE 4 MG/ML
INJECTION, SOLUTION INTRA-ARTICULAR; INTRALESIONAL; INTRAMUSCULAR; INTRAVENOUS; SOFT TISSUE
Status: DISPENSED
Start: 2022-11-21

## (undated) RX ORDER — FENTANYL CITRATE 50 UG/ML
INJECTION, SOLUTION INTRAMUSCULAR; INTRAVENOUS
Status: DISPENSED
Start: 2020-10-20

## (undated) RX ORDER — CEFAZOLIN SODIUM 1 G/3ML
INJECTION, POWDER, FOR SOLUTION INTRAMUSCULAR; INTRAVENOUS
Status: DISPENSED
Start: 2025-03-05

## (undated) RX ORDER — METOPROLOL SUCCINATE 50 MG/1
TABLET, EXTENDED RELEASE ORAL
Status: DISPENSED
Start: 2025-03-05

## (undated) RX ORDER — GLYCOPYRROLATE 0.2 MG/ML
INJECTION INTRAMUSCULAR; INTRAVENOUS
Status: DISPENSED
Start: 2018-05-08

## (undated) RX ORDER — LIDOCAINE HYDROCHLORIDE 20 MG/ML
INJECTION, SOLUTION EPIDURAL; INFILTRATION; INTRACAUDAL; PERINEURAL
Status: DISPENSED
Start: 2020-10-20

## (undated) RX ORDER — LIDOCAINE HYDROCHLORIDE 10 MG/ML
INJECTION, SOLUTION EPIDURAL; INFILTRATION; INTRACAUDAL; PERINEURAL
Status: DISPENSED
Start: 2018-05-08

## (undated) RX ORDER — PROPOFOL 10 MG/ML
INJECTION, EMULSION INTRAVENOUS
Status: DISPENSED
Start: 2025-03-05

## (undated) RX ORDER — TRANEXAMIC ACID 650 MG/1
TABLET ORAL
Status: DISPENSED
Start: 2020-10-20

## (undated) RX ORDER — CEFAZOLIN SODIUM 2 G/100ML
INJECTION, SOLUTION INTRAVENOUS
Status: DISPENSED
Start: 2018-06-05

## (undated) RX ORDER — FENTANYL CITRATE 50 UG/ML
INJECTION, SOLUTION INTRAMUSCULAR; INTRAVENOUS
Status: DISPENSED
Start: 2025-03-05

## (undated) RX ORDER — GABAPENTIN 100 MG/1
CAPSULE ORAL
Status: DISPENSED
Start: 2025-03-05

## (undated) RX ORDER — CEFAZOLIN SODIUM 1 G/3ML
INJECTION, POWDER, FOR SOLUTION INTRAMUSCULAR; INTRAVENOUS
Status: DISPENSED
Start: 2020-10-20

## (undated) RX ORDER — PROPOFOL 10 MG/ML
INJECTION, EMULSION INTRAVENOUS
Status: DISPENSED
Start: 2018-05-08

## (undated) RX ORDER — CEFAZOLIN SODIUM 2 G/100ML
INJECTION, SOLUTION INTRAVENOUS
Status: DISPENSED
Start: 2018-05-08

## (undated) RX ORDER — FENTANYL CITRATE 50 UG/ML
INJECTION, SOLUTION INTRAMUSCULAR; INTRAVENOUS
Status: DISPENSED
Start: 2022-11-21

## (undated) RX ORDER — ONDANSETRON 2 MG/ML
INJECTION INTRAMUSCULAR; INTRAVENOUS
Status: DISPENSED
Start: 2018-05-08

## (undated) RX ORDER — BUPIVACAINE HYDROCHLORIDE AND EPINEPHRINE 5; 5 MG/ML; UG/ML
INJECTION, SOLUTION EPIDURAL; INTRACAUDAL; PERINEURAL
Status: DISPENSED
Start: 2022-11-21

## (undated) RX ORDER — HYDROMORPHONE HCL IN WATER/PF 6 MG/30 ML
PATIENT CONTROLLED ANALGESIA SYRINGE INTRAVENOUS
Status: DISPENSED
Start: 2025-03-05

## (undated) RX ORDER — FENTANYL CITRATE 50 UG/ML
INJECTION, SOLUTION INTRAMUSCULAR; INTRAVENOUS
Status: DISPENSED
Start: 2018-05-08

## (undated) RX ORDER — KETOROLAC TROMETHAMINE 30 MG/ML
INJECTION, SOLUTION INTRAMUSCULAR; INTRAVENOUS
Status: DISPENSED
Start: 2018-05-08

## (undated) RX ORDER — GLYCOPYRROLATE 0.2 MG/ML
INJECTION, SOLUTION INTRAMUSCULAR; INTRAVENOUS
Status: DISPENSED
Start: 2020-10-20

## (undated) RX ORDER — FENTANYL CITRATE 50 UG/ML
INJECTION, SOLUTION INTRAMUSCULAR; INTRAVENOUS
Status: DISPENSED
Start: 2018-06-05

## (undated) RX ORDER — HYDROMORPHONE HYDROCHLORIDE 1 MG/ML
INJECTION, SOLUTION INTRAMUSCULAR; INTRAVENOUS; SUBCUTANEOUS
Status: DISPENSED
Start: 2025-03-05

## (undated) RX ORDER — PHENYLEPHRINE HCL IN 0.9% NACL 1 MG/10 ML
SYRINGE (ML) INTRAVENOUS
Status: DISPENSED
Start: 2018-05-08

## (undated) RX ORDER — OXYCODONE HYDROCHLORIDE 5 MG/1
TABLET ORAL
Status: DISPENSED
Start: 2018-06-05

## (undated) RX ORDER — ACETAMINOPHEN 325 MG/1
TABLET ORAL
Status: DISPENSED
Start: 2022-11-21

## (undated) RX ORDER — CEFAZOLIN SODIUM/WATER 2 G/20 ML
SYRINGE (ML) INTRAVENOUS
Status: DISPENSED
Start: 2022-11-21

## (undated) RX ORDER — ONDANSETRON 2 MG/ML
INJECTION INTRAMUSCULAR; INTRAVENOUS
Status: DISPENSED
Start: 2020-10-20

## (undated) RX ORDER — PROPOFOL 10 MG/ML
INJECTION, EMULSION INTRAVENOUS
Status: DISPENSED
Start: 2020-10-20